# Patient Record
Sex: MALE | Race: WHITE | NOT HISPANIC OR LATINO | Employment: OTHER | URBAN - METROPOLITAN AREA
[De-identification: names, ages, dates, MRNs, and addresses within clinical notes are randomized per-mention and may not be internally consistent; named-entity substitution may affect disease eponyms.]

---

## 2017-01-23 ENCOUNTER — DOCUMENTATION ONLY (OUTPATIENT)
Dept: FAMILY MEDICINE | Facility: CLINIC | Age: 77
End: 2017-01-23

## 2017-01-23 NOTE — PROGRESS NOTES
Pre-Visit Chart Review  For Appointment Scheduled on 1/26/17.    Health Maintenance Due   Topic Date Due    TETANUS VACCINE  07/06/1959    Influenza Vaccine  08/01/2016    Lipid Panel  10/13/2016

## 2017-01-26 ENCOUNTER — OFFICE VISIT (OUTPATIENT)
Dept: FAMILY MEDICINE | Facility: CLINIC | Age: 77
End: 2017-01-26
Payer: MEDICARE

## 2017-01-26 ENCOUNTER — HOSPITAL ENCOUNTER (OUTPATIENT)
Dept: RADIOLOGY | Facility: CLINIC | Age: 77
Discharge: HOME | End: 2017-01-26
Attending: FAMILY MEDICINE
Payer: MEDICARE

## 2017-01-26 VITALS
SYSTOLIC BLOOD PRESSURE: 139 MMHG | BODY MASS INDEX: 26.21 KG/M2 | HEIGHT: 71 IN | HEART RATE: 69 BPM | WEIGHT: 187.19 LBS | DIASTOLIC BLOOD PRESSURE: 85 MMHG | TEMPERATURE: 98 F

## 2017-01-26 DIAGNOSIS — Z12.5 SCREENING FOR PROSTATE CANCER: ICD-10-CM

## 2017-01-26 DIAGNOSIS — Z00.00 ROUTINE MEDICAL EXAM: Primary | ICD-10-CM

## 2017-01-26 DIAGNOSIS — Z23 IMMUNIZATION DUE: ICD-10-CM

## 2017-01-26 DIAGNOSIS — R20.8 SENSATION OF CHANGE IN TEMPERATURE: ICD-10-CM

## 2017-01-26 DIAGNOSIS — E78.2 HYPERLIPIDEMIA, MIXED: ICD-10-CM

## 2017-01-26 DIAGNOSIS — R06.02 SOB (SHORTNESS OF BREATH) ON EXERTION: ICD-10-CM

## 2017-01-26 PROCEDURE — 71020 XR CHEST PA AND LATERAL: CPT | Mod: TC,PO

## 2017-01-26 PROCEDURE — 99397 PER PM REEVAL EST PAT 65+ YR: CPT | Mod: 25,S$GLB,, | Performed by: FAMILY MEDICINE

## 2017-01-26 PROCEDURE — 93010 ELECTROCARDIOGRAM REPORT: CPT | Mod: S$GLB,,, | Performed by: INTERNAL MEDICINE

## 2017-01-26 PROCEDURE — 99999 PR PBB SHADOW E&M-EST. PATIENT-LVL IV: CPT | Mod: PBBFAC,,, | Performed by: FAMILY MEDICINE

## 2017-01-26 PROCEDURE — 99499 UNLISTED E&M SERVICE: CPT | Mod: S$GLB,,, | Performed by: FAMILY MEDICINE

## 2017-01-26 PROCEDURE — 71020 XR CHEST PA AND LATERAL: CPT | Mod: 26,,, | Performed by: RADIOLOGY

## 2017-01-26 PROCEDURE — 93005 ELECTROCARDIOGRAM TRACING: CPT | Mod: S$GLB,,, | Performed by: FAMILY MEDICINE

## 2017-01-26 NOTE — MR AVS SNAPSHOT
[facility]  [address]  [place] [address]  Phone: [phone]  Fax: [phone]                  Willie Liao   2017 1:20 PM   Office Visit    Description:  Male : 1940   Provider:  Anabella Mcintyre MD   Department:  [facility]           Reason for Visit     Establish Care           Diagnoses this Visit        Comments    Routine medical exam    -  Primary     Hyperlipidemia, mixed         Screening for prostate cancer         Immunization due         SOB (shortness of breath) on exertion         Sensation of change in temperature                To Do List           Future Appointments        Provider Department Dept Phone    2017 3:45 PM [facility] XR1 [facility]- X-Ray [phone]    2017 1:45 PM ECHO, SLIDEMARY JANE [facility] - Cardiology [phone]    2017 12:30 PM SLIC US1 [facility]- Ultrasound [phone]    2017 2:00 PM [facility] PULMONARY [facility] [phone]    2017 8:30 AM LAB, IAN SAT [facility] - Lab [phone]      Goals (5 Years of Data)     None      Follow-Up and Disposition     Return in about 6 months (around 2017) for hyperlipidemia.      [facility]     [facility] Care [facility] - 24/7 Assistance  Registered nurses in the [facility] provide clinical advisement, health education, appointment booking, and other advisory services.  Call for this free service at [phone].             Medications           Message regarding Medications     Verify the changes and/or additions to your medication regime listed below are the same as discussed with your clinician today.  If any of these changes or additions are incorrect, please notify your healthcare provider.        STOP taking these medications     b complex vitamins capsule Take 1 capsule by mouth once daily.     pantoprazole (PROTONIX) 40 MG tablet Take 40 mg by mouth once daily.            Verify that  "the below list of medications is an accurate representation of the medications you are currently taking.  If none reported, the list may be blank. If incorrect, please contact your healthcare provider. Carry this list with you in case of emergency.           Current Medications     aspirin (ECOTRIN) 81 MG EC tablet Take 1 tablet (81 mg total) by mouth once daily.    CALCIUM ORAL Take 1 tablet by mouth once daily.    CYANOCOBALAMIN, VITAMIN B-12, (VITAMIN B-12 ORAL) Take 1 tablet by mouth once daily.    MULTIVITAMIN WITH MINERALS/LUT (MULTIVITAMIN 50 PLUS ORAL) Take 1 tablet by mouth once daily.     OMEGA-3 FATTY ACIDS/FISH OIL (OMEGA 3 FISH OIL ORAL) Take 1 capsule by mouth once daily.    simvastatin (ZOCOR) 20 MG tablet TAKE 1 TABLET EVERY EVENING    VITAMIN C 500 MG tablet            Clinical Reference Information           Vital Signs - Last Recorded  Most recent update: 1/26/2017  2:15 PM by Luiza Genao MA    BP Pulse Temp    139/85 (BP Location: Right arm, Patient Position: Sitting, BP Method: Automatic) 69 97.5 °F (36.4 °C) (Oral)    Ht Wt BMI    5' 10.5" (1.791 m) 84.9 kg (187 lb 2.7 oz) 26.48 kg/m2      Blood Pressure          Most Recent Value    BP  139/85      Allergies as of 1/26/2017     No Known Drug Allergies      Immunizations Administered on Date of Encounter - 1/26/2017     Name Date Dose VIS Date Route    Pneumococcal Polysaccharide - 23 Valent  Deferred   -- -- --      Orders Placed During Today's Visit      Normal Orders This Visit    IN OFFICE EKG 12-LEAD (to Muse)     Pneumococcal Polysaccharide Vaccine (23 Valent) (SQ/IM)     Future Labs/Procedures Expected by Expires    CBC auto differential  1/26/2017 3/27/2018    Comprehensive metabolic panel  1/26/2017 3/27/2018    Lipid panel  1/26/2017 3/27/2018    PSA, Screening  1/26/2017 1/26/2018    TSH  1/26/2017 3/27/2018    US Upper Extremity Arteries Bilateral  1/26/2017 1/26/2018    X-Ray Chest PA And Lateral  1/26/2017 1/26/2018    Complete " PFT w/ bronchodilator  As directed 1/26/2018    Exercise stress echo with color flow  As directed 1/26/2018      Instructions    Call clinic and let doctor know after you have completed the breathing tests

## 2017-01-26 NOTE — PROGRESS NOTES
CHIEF COMPLAINT:  Routine medical exam      HISTORY OF PRESENT ILLNESS:  Willie Liao is a 76 y.o. male who presents to clinic as a new patient to me for a routine medical exam. He has noticed SOB with exercise, he denies any CP. He also feels that his left arm and hand are cooler than the right.     He requests a screening PSA. He is due for pneumovax.     REVIEW OF SYSTEMS:  The patient denies any fever, chills, night sweats, headaches, vision changes, difficulty speaking or swallowing, decreased hearing, weight loss, weight gain, chest pain, palpitations, , cough, nausea, vomiting, abdominal pain, dysuria, diarrhea, constipation, hematuria, hematochezia, melena, changes in his hair, skin, nails, numbness or weakness in his extremities, erythema, swelling or pain over any of his joints, myalgia, swollen glands, easy bruising, fatigue, edema.     MEDICATIONS:   Reviewed and/or reconciled in EPIC    ALLERGIES:  Reviewed and/or reconciled in [facility]    PAST MEDICAL/SURGICAL HISTORY:   Past Medical History   Diagnosis Date    Carotid stenosis     Gastritis     GERD (gastroesophageal reflux disease)     H. pylori infection     Hyperlipidemia       Past Surgical History   Procedure Laterality Date    Arthroscope       Dr. Plaza    Colonoscopy       Adrien       FAMILY HISTORY:    Family History   Problem Relation Age of Onset    Heart disease Father     Hyperlipidemia Father     Stroke Father     COPD Mother     Glaucoma Mother     No Known Problems Sister     No Known Problems Brother     No Known Problems Daughter     Diabetes       uncles  and aunts        SOCIAL HISTORY:    Social History     Social History    Marital status:      Spouse name: N/A    Number of children: N/A    Years of education: N/A     Occupational History    Not on file.     Social History Main Topics    Smoking status: Former Smoker     Packs/day: 0.25     Quit date: 7/5/1997    Smokeless tobacco: Never Used     Alcohol use No    Drug use: No    Sexual activity: Yes     Other Topics Concern    Not on file     Social History Narrative     He eats a healthy diet, works out at the gym regularly  PHYSICAL EXAM:  VITAL SIGNS: There were no vitals filed for this visit.  GENERAL:  Patient appears well nourished, sitting on exam table, in no acute distress.  HEENT:  Atraumatic, normocephalic, PERRLA, EOMI, no conjunctival injection, sclerae are anicteric, normal external auditory canals,TMs clear b/l, gross hearing intact to whisper, MMM, no oropharygneal erythema or exudate.  NECK:  Supple, normal ROM, trachea is midline , no supraclavicular or cervical LAD or masses palpated.  Thyroid gland not palpable.  CARDIOVASCULAR:  RRR, normal S1 and S2, no m/r/g.  RESPIRATORY:  CTA b/l, no wheezes, rhonchi, rales.  No increased work of breathing, no  use of accessory muscles.  ABDOMEN:  Soft, nontender, nondistended, normoactive bowel sounds in all four quadrants, no rebound or guarding, no HSM or masses palpated.  Normal percussion.  EXTREMITIES:  2+ DP pulses b/l, no edema.  SKIN:  Warm, no lesions on exposed skin.  NEUROMUSCULAR:  Cranial nerves II-XII grossly intact.  Strength is 4+/5 over upper and lower extremity flexors/extensors b/l, 2+ biceps and patellar reflexes b/l. No clubbing or cyanosis of digits/nails.  Steady gait.  PSYCH:  Patient is alert and oriented to person, time, place. They are appropriately dressed and groomed. There is normal eye contact. Rate and tone of speech is normal. Normal insight, judgement. Normal thought content and process.     LABORATORY/IMAGING STUDIES: I have reviewed a 12 lead EKG which demonstrates a rate of 66 bpm, NSR, no ST changes, axis deviation    ASSESSMENT/PLAN: This is a 76 y.o. male who presents to clinic for routine medical exam  1. Routine medical exam, screening for prostate cancer, immunization due: We will check a screening CBC, CMP, TSH, fasting lipid panel, PSA. Patient will  be notified of these results and the need for any further evaluation and treatment. He will follow up with his dentist/optometrist as scheduled. We discussed the importance of increasing his exercise, continuing with a healthy, well-balanced diet. He will receive a pneumovax in clinic today    2. Hyperlipidemia, mixed  - CBC auto differential; Future  - Comprehensive metabolic panel; Future  - Lipid panel; Future  - TSH; Future    3. SOB (shortness of breath) on exertion  - IN OFFICE EKG 12-LEAD (to Muse)  - X-Ray Chest PA And Lateral; Future  - Complete PFT w/ bronchodilator; Future  - Exercise stress echo with color flow; Future    4. Sensation of change in temperature  - US Upper Extremity Arteries Bilateral; Future            Anabella Mcintyre MD

## 2017-01-30 ENCOUNTER — CLINICAL SUPPORT (OUTPATIENT)
Dept: CARDIOLOGY | Facility: CLINIC | Age: 77
End: 2017-01-30
Payer: MEDICARE

## 2017-01-30 DIAGNOSIS — R06.02 SOB (SHORTNESS OF BREATH) ON EXERTION: ICD-10-CM

## 2017-01-30 LAB
DIASTOLIC DYSFUNCTION: NO
ESTIMATED PA SYSTOLIC PRESSURE: 19.36
MITRAL VALVE REGURGITATION: NORMAL
RETIRED EF AND QEF - SEE NOTES: 70 (ref 55–65)
TRICUSPID VALVE REGURGITATION: NORMAL

## 2017-01-30 PROCEDURE — 93351 STRESS TTE COMPLETE: CPT | Mod: S$GLB,,, | Performed by: INTERNAL MEDICINE

## 2017-01-30 PROCEDURE — 93320 DOPPLER ECHO COMPLETE: CPT | Mod: S$GLB,,, | Performed by: INTERNAL MEDICINE

## 2017-01-30 PROCEDURE — 93325 DOPPLER ECHO COLOR FLOW MAPG: CPT | Mod: S$GLB,,, | Performed by: INTERNAL MEDICINE

## 2017-01-31 ENCOUNTER — TELEPHONE (OUTPATIENT)
Dept: FAMILY MEDICINE | Facility: CLINIC | Age: 77
End: 2017-01-31

## 2017-01-31 ENCOUNTER — HOSPITAL ENCOUNTER (OUTPATIENT)
Dept: RADIOLOGY | Facility: CLINIC | Age: 77
Discharge: HOME | End: 2017-01-31
Attending: FAMILY MEDICINE
Payer: MEDICARE

## 2017-01-31 ENCOUNTER — HOSPITAL ENCOUNTER (OUTPATIENT)
Dept: RESPIRATORY THERAPY | Facility: HOSPITAL | Age: 77
Discharge: HOME | End: 2017-01-31
Attending: FAMILY MEDICINE
Payer: MEDICARE

## 2017-01-31 DIAGNOSIS — R20.8 SENSATION OF CHANGE IN TEMPERATURE: ICD-10-CM

## 2017-01-31 DIAGNOSIS — I77.1 SUBCLAVIAN ARTERY STENOSIS: Primary | ICD-10-CM

## 2017-01-31 DIAGNOSIS — R06.02 SOB (SHORTNESS OF BREATH) ON EXERTION: ICD-10-CM

## 2017-01-31 PROCEDURE — 93930 UPPER EXTREMITY STUDY: CPT | Mod: TC,PO

## 2017-01-31 PROCEDURE — 94060 EVALUATION OF WHEEZING: CPT

## 2017-01-31 PROCEDURE — 93930 UPPER EXTREMITY STUDY: CPT | Mod: 26,,, | Performed by: RADIOLOGY

## 2017-01-31 PROCEDURE — 94729 DIFFUSING CAPACITY: CPT | Mod: 26,,, | Performed by: INTERNAL MEDICINE

## 2017-01-31 PROCEDURE — 94727 GAS DIL/WSHOT DETER LNG VOL: CPT

## 2017-01-31 PROCEDURE — 94729 DIFFUSING CAPACITY: CPT

## 2017-01-31 PROCEDURE — 94060 EVALUATION OF WHEEZING: CPT | Mod: 26,,, | Performed by: INTERNAL MEDICINE

## 2017-01-31 NOTE — TELEPHONE ENCOUNTER
----- Message from Otto Siddiqui sent at 1/31/2017  3:35 PM CST -----  Wife called stated that the pt has completed the breathing test

## 2017-02-02 ENCOUNTER — LAB VISIT (OUTPATIENT)
Dept: LAB | Facility: HOSPITAL | Age: 77
End: 2017-02-02
Attending: FAMILY MEDICINE
Payer: MEDICARE

## 2017-02-02 ENCOUNTER — TELEPHONE (OUTPATIENT)
Dept: FAMILY MEDICINE | Facility: CLINIC | Age: 77
End: 2017-02-02

## 2017-02-02 DIAGNOSIS — E78.2 HYPERLIPIDEMIA, MIXED: ICD-10-CM

## 2017-02-02 DIAGNOSIS — Z12.5 SCREENING FOR PROSTATE CANCER: ICD-10-CM

## 2017-02-02 DIAGNOSIS — Z00.00 ROUTINE MEDICAL EXAM: ICD-10-CM

## 2017-02-02 LAB
ALBUMIN SERPL BCP-MCNC: 3.6 G/DL
ALP SERPL-CCNC: 86 U/L
ALT SERPL W/O P-5'-P-CCNC: 23 U/L
ANION GAP SERPL CALC-SCNC: 5 MMOL/L
AST SERPL-CCNC: 21 U/L
BASOPHILS # BLD AUTO: 0.02 K/UL
BASOPHILS NFR BLD: 0.3 %
BILIRUB SERPL-MCNC: 1.5 MG/DL
BUN SERPL-MCNC: 16 MG/DL
CALCIUM SERPL-MCNC: 8.6 MG/DL
CHLORIDE SERPL-SCNC: 107 MMOL/L
CHOLEST/HDLC SERPL: 4 {RATIO}
CO2 SERPL-SCNC: 26 MMOL/L
COMPLEXED PSA SERPL-MCNC: 0.88 NG/ML
CREAT SERPL-MCNC: 1.1 MG/DL
DIFFERENTIAL METHOD: ABNORMAL
EOSINOPHIL # BLD AUTO: 0.3 K/UL
EOSINOPHIL NFR BLD: 3.8 %
ERYTHROCYTE [DISTWIDTH] IN BLOOD BY AUTOMATED COUNT: 12.9 %
EST. GFR  (AFRICAN AMERICAN): >60 ML/MIN/1.73 M^2
EST. GFR  (NON AFRICAN AMERICAN): >60 ML/MIN/1.73 M^2
GLUCOSE SERPL-MCNC: 116 MG/DL
HCT VFR BLD AUTO: 44.1 %
HDL/CHOLESTEROL RATIO: 25.2 %
HDLC SERPL-MCNC: 159 MG/DL
HDLC SERPL-MCNC: 40 MG/DL
HGB BLD-MCNC: 15.3 G/DL
LDLC SERPL CALC-MCNC: 103.2 MG/DL
LYMPHOCYTES # BLD AUTO: 2.4 K/UL
LYMPHOCYTES NFR BLD: 35.3 %
MCH RBC QN AUTO: 31.9 PG
MCHC RBC AUTO-ENTMCNC: 34.7 %
MCV RBC AUTO: 92 FL
MONOCYTES # BLD AUTO: 0.5 K/UL
MONOCYTES NFR BLD: 7.5 %
NEUTROPHILS # BLD AUTO: 3.5 K/UL
NEUTROPHILS NFR BLD: 52.8 %
NONHDLC SERPL-MCNC: 119 MG/DL
PLATELET # BLD AUTO: 214 K/UL
PMV BLD AUTO: 12.2 FL
POTASSIUM SERPL-SCNC: 4.3 MMOL/L
PROT SERPL-MCNC: 6.7 G/DL
RBC # BLD AUTO: 4.8 M/UL
SODIUM SERPL-SCNC: 138 MMOL/L
TRIGL SERPL-MCNC: 79 MG/DL
TSH SERPL DL<=0.005 MIU/L-ACNC: 3.14 UIU/ML
WBC # BLD AUTO: 6.65 K/UL

## 2017-02-02 PROCEDURE — 36415 COLL VENOUS BLD VENIPUNCTURE: CPT | Mod: PO

## 2017-02-02 PROCEDURE — 84443 ASSAY THYROID STIM HORMONE: CPT

## 2017-02-02 PROCEDURE — 80053 COMPREHEN METABOLIC PANEL: CPT

## 2017-02-02 PROCEDURE — 85025 COMPLETE CBC W/AUTO DIFF WBC: CPT

## 2017-02-02 PROCEDURE — 80061 LIPID PANEL: CPT

## 2017-02-02 PROCEDURE — 84153 ASSAY OF PSA TOTAL: CPT

## 2017-02-02 NOTE — PROCEDURES
Pulmonary Functions, including spirometry and bronchodilator response and diffusion, study was done Jan 31, 2017.  Spirometry shows no obstruction and normal vital capacity and bronchodilator response of over 6%with over 12% needed for statistical significance.   FEV1 is 98% or 3.19 liters.     Diffusion shows within normal range.    Pulmonary functions show normal. Clinical correlation recommended.     Zhang Child M.D.

## 2017-02-02 NOTE — TELEPHONE ENCOUNTER
Stress echo was normal.    The left arm arterial ultrasound demonstrated a significant blockage in his left subclavian artery.  He needs to see vascular surgery, they may want to stent this.    The final PFT report is not yet available.

## 2017-02-02 NOTE — TELEPHONE ENCOUNTER
----- Message from Anabella Mcintyre MD sent at 2/2/2017  1:15 PM CST -----  Look up final PFT results

## 2017-02-03 NOTE — TELEPHONE ENCOUNTER
Patient notified and states understanding.vascular appointment scheduled 2/22/17 at 2pm is this appointment ok??

## 2017-02-09 ENCOUNTER — TELEPHONE (OUTPATIENT)
Dept: FAMILY MEDICINE | Facility: CLINIC | Age: 77
End: 2017-02-09

## 2017-02-09 DIAGNOSIS — R73.9 HYPERGLYCEMIA: Primary | ICD-10-CM

## 2017-02-09 NOTE — TELEPHONE ENCOUNTER
Final PFT results are normal. No evidence of lung disease such as asthma, COPD, emphysema.     Stress echo did not demonstrate any significant abnormalities.    Needs to continue to monitor his SOB on exertion, notify me if this worsens.

## 2017-02-09 NOTE — TELEPHONE ENCOUNTER
----- Message from Anabella Mcintyre MD sent at 2/2/2017  1:28 PM CST -----  Awaiting final read on PFTs.

## 2017-02-09 NOTE — TELEPHONE ENCOUNTER
Patient notified of results.  He has no questions or concerns at this time.  Will follow up if needed.

## 2017-02-10 RX ORDER — ATORVASTATIN CALCIUM 20 MG/1
20 TABLET, FILM COATED ORAL DAILY
Qty: 90 TABLET | Refills: 3 | Status: SHIPPED | OUTPATIENT
Start: 2017-02-10 | End: 2018-03-02 | Stop reason: SDUPTHER

## 2017-02-10 NOTE — TELEPHONE ENCOUNTER
Spoke to pt and wife--advised of results. Scheduled repeat labs for 2/16/17. Pt is agreeable to changing his statin. Please send 90 day supply to [facility]. Thanks, Luiza

## 2017-02-10 NOTE — TELEPHONE ENCOUNTER
Blood work normal except that cardiac risk is high enough to be on a high intensity statin such as lipitor or crestor instead of zocor. Let me know if he would like to change this.  Also FBG is high concerning for prediabetes. Needs to eat low carb, low fat diet and have repeat FBG, HgA1c      Please also give these results and the PFT results to his wife

## 2017-02-15 ENCOUNTER — LAB VISIT (OUTPATIENT)
Dept: LAB | Facility: HOSPITAL | Age: 77
End: 2017-02-15
Attending: FAMILY MEDICINE
Payer: MEDICARE

## 2017-02-15 DIAGNOSIS — R73.9 HYPERGLYCEMIA: ICD-10-CM

## 2017-02-15 LAB — GLUCOSE SERPL-MCNC: 114 MG/DL

## 2017-02-15 PROCEDURE — 36415 COLL VENOUS BLD VENIPUNCTURE: CPT | Mod: PO

## 2017-02-15 PROCEDURE — 83036 HEMOGLOBIN GLYCOSYLATED A1C: CPT

## 2017-02-15 PROCEDURE — 82947 ASSAY GLUCOSE BLOOD QUANT: CPT

## 2017-02-16 LAB
ESTIMATED AVG GLUCOSE: 111 MG/DL
HBA1C MFR BLD HPLC: 5.5 %

## 2017-02-22 ENCOUNTER — OFFICE VISIT (OUTPATIENT)
Dept: VASCULAR SURGERY | Facility: CLINIC | Age: 77
End: 2017-02-22
Payer: MEDICARE

## 2017-02-22 ENCOUNTER — TELEPHONE (OUTPATIENT)
Dept: FAMILY MEDICINE | Facility: CLINIC | Age: 77
End: 2017-02-22

## 2017-02-22 VITALS
BODY MASS INDEX: 26.23 KG/M2 | HEART RATE: 85 BPM | WEIGHT: 187.38 LBS | HEIGHT: 71 IN | SYSTOLIC BLOOD PRESSURE: 156 MMHG | DIASTOLIC BLOOD PRESSURE: 93 MMHG

## 2017-02-22 DIAGNOSIS — I77.1 SUBCLAVIAN ARTERY STENOSIS, LEFT: Primary | ICD-10-CM

## 2017-02-22 PROCEDURE — 1157F ADVNC CARE PLAN IN RCRD: CPT | Mod: S$GLB,,, | Performed by: THORACIC SURGERY (CARDIOTHORACIC VASCULAR SURGERY)

## 2017-02-22 PROCEDURE — 1160F RVW MEDS BY RX/DR IN RCRD: CPT | Mod: S$GLB,,, | Performed by: THORACIC SURGERY (CARDIOTHORACIC VASCULAR SURGERY)

## 2017-02-22 PROCEDURE — 99499 UNLISTED E&M SERVICE: CPT | Mod: S$GLB,,, | Performed by: THORACIC SURGERY (CARDIOTHORACIC VASCULAR SURGERY)

## 2017-02-22 PROCEDURE — 1126F AMNT PAIN NOTED NONE PRSNT: CPT | Mod: S$GLB,,, | Performed by: THORACIC SURGERY (CARDIOTHORACIC VASCULAR SURGERY)

## 2017-02-22 PROCEDURE — 1159F MED LIST DOCD IN RCRD: CPT | Mod: S$GLB,,, | Performed by: THORACIC SURGERY (CARDIOTHORACIC VASCULAR SURGERY)

## 2017-02-22 PROCEDURE — 99213 OFFICE O/P EST LOW 20 MIN: CPT | Mod: S$GLB,,, | Performed by: THORACIC SURGERY (CARDIOTHORACIC VASCULAR SURGERY)

## 2017-02-22 NOTE — PROGRESS NOTES
OFFICE NOTE    HISTORY OF PRESENT ILLNESS:  This is a 76-year-old gentleman with known carotid   occlusive disease.  He has been asymptomatic with this; however, he recently had   ultrasound of the extremity showing high-grade left subclavian artery stenosis.    He does get occasionally symptomatic with coolness of the hand and fingers   when the temperature drops outside.  His medicines are noted.  His problem list   is reviewed.    PHYSICAL EXAMINATION:    VITAL SIGNS:  Stable.  He does have a slight pressure discrepancy between the   right and left upper extremities, but not terribly significant.    CHEST:  Clear.    HEART:  Regular rate and rhythm.    ABDOMEN:  Benign.    EXTREMITIES:  Femoral pulses are equal.  Radial pulses somewhat diminished on   the left compared to the right.    At this point, he is asymptomatic from his subclavian artery stenosis except for   the coolness in his hand and fingers.  I told him that if this progresses or he   has significant pain or more problems with fatigue in claudication, then   consideration could be made for revascularization percutaneously of the left   subclavian artery, but at this point, I think conservative management is   warranted.  He is scheduled to get a carotid ultrasound within the next couple   of months and based on this, we can make recommendations regarding his carotid   and subclavian artery occlusive disease, but I think medical management right   now is indicated.      BETTE  dd: 02/22/2017 14:38:22 (CST)  td: 02/23/2017 01:16:04 (CST)  Doc ID   #[id number]  Job ID #[id number]    CC:

## 2017-02-22 NOTE — LETTER
February 22, 2017      Anabella Mcintyre MD  [address]  [place] [address]           [place] [facility]  [address], [address]  [place] [address]  Phone: [phone]          Patient: Willie Liao   MR Number: [id number]   YOB: 1940   Date of Visit: 2/22/2017       Dear Dr. Anabella Mcintyre:    Thank you for referring Willie Liao to me for evaluation. Attached you will find relevant portions of my assessment and plan of care.    If you have questions, please do not hesitate to call me. I look forward to following Willie Liao along with you.    Sincerely,    Pedro Pablo Parry MD    Enclosure  CC:  No Recipients    If you would like to receive this communication electronically, please contact [email or web address] or [phone] to request more information on [employer] Link access.    For providers and/or their staff who would like to refer a patient to [facility], please contact us through our one-stop-shop provider referral line, [facility], at [phone].    If you feel you have received this communication in error or would no longer like to receive these types of communications, please e-mail [email or web address]

## 2017-03-14 ENCOUNTER — TELEPHONE (OUTPATIENT)
Dept: FAMILY MEDICINE | Facility: CLINIC | Age: 77
End: 2017-03-14

## 2017-03-15 ENCOUNTER — CLINICAL SUPPORT (OUTPATIENT)
Dept: FAMILY MEDICINE | Facility: CLINIC | Age: 77
End: 2017-03-15
Payer: MEDICARE

## 2017-03-15 PROCEDURE — G0009 ADMIN PNEUMOCOCCAL VACCINE: HCPCS | Mod: S$GLB,,, | Performed by: PHYSICIAN ASSISTANT

## 2017-03-15 PROCEDURE — 90732 PPSV23 VACC 2 YRS+ SUBQ/IM: CPT | Mod: S$GLB,,, | Performed by: PHYSICIAN ASSISTANT

## 2017-03-15 NOTE — PROGRESS NOTES
Patient identified by 2 patient identified (name and ). Administered Pneumoccal polysaccharide 23  IM. Patient tolerated well. No bleeding at insertion site noted. Aseptic technique maintained.

## 2017-04-11 ENCOUNTER — DOCUMENTATION ONLY (OUTPATIENT)
Dept: FAMILY MEDICINE | Facility: CLINIC | Age: 77
End: 2017-04-11

## 2017-04-11 NOTE — PROGRESS NOTES
Pre-Visit Chart Review  For Appointment Scheduled on 04/13/2017      There are no preventive care reminders to display for this patient.

## 2017-04-13 ENCOUNTER — OFFICE VISIT (OUTPATIENT)
Dept: FAMILY MEDICINE | Facility: CLINIC | Age: 77
End: 2017-04-13
Payer: MEDICARE

## 2017-04-13 VITALS
SYSTOLIC BLOOD PRESSURE: 142 MMHG | TEMPERATURE: 98 F | DIASTOLIC BLOOD PRESSURE: 80 MMHG | HEART RATE: 69 BPM | HEIGHT: 71 IN | WEIGHT: 185.88 LBS | BODY MASS INDEX: 26.02 KG/M2

## 2017-04-13 DIAGNOSIS — Z00.00 ENCOUNTER FOR PREVENTIVE HEALTH EXAMINATION: ICD-10-CM

## 2017-04-13 DIAGNOSIS — I77.9 CAROTID ARTERY DISEASE, UNSPECIFIED LATERALITY: ICD-10-CM

## 2017-04-13 DIAGNOSIS — E78.2 HYPERLIPIDEMIA, MIXED: ICD-10-CM

## 2017-04-13 DIAGNOSIS — E80.4 GILBERT'S SYNDROME: ICD-10-CM

## 2017-04-13 DIAGNOSIS — K21.9 GASTROESOPHAGEAL REFLUX DISEASE WITHOUT ESOPHAGITIS: ICD-10-CM

## 2017-04-13 DIAGNOSIS — I77.1 SUBCLAVIAN ARTERY STENOSIS, LEFT: Primary | ICD-10-CM

## 2017-04-13 PROCEDURE — G0439 PPPS, SUBSEQ VISIT: HCPCS | Mod: S$GLB,,, | Performed by: PHYSICIAN ASSISTANT

## 2017-04-13 PROCEDURE — 99999 PR PBB SHADOW E&M-EST. PATIENT-LVL III: CPT | Mod: PBBFAC,,, | Performed by: PHYSICIAN ASSISTANT

## 2017-04-13 RX ORDER — PANTOPRAZOLE SODIUM 40 MG/1
40 TABLET, DELAYED RELEASE ORAL DAILY
COMMUNITY
End: 2024-02-27

## 2017-04-13 NOTE — Clinical Note
Primary Care Providers: Anabella Mcintyre MD, MD (General)  Your patient was seen today for a HRA visit. Gap(s) in care (HEDIS gaps) have been identified during this visit that require additional testing and possible follow up.  No orders of the defined types were placed in this encounter.   These orders were placed using [facility] approved protocol and any results will be forwarded to your office for appropriate follow up. I have included a copy of my visit note; please review the note and feel free to contact me with any questions.   Thank you for allowing me to participate in the care of your patients. SAGAR Mariee

## 2017-04-13 NOTE — PROGRESS NOTES
"Willie Liao presented for a  Medicare AWV and comprehensive Health Risk Assessment today. The following components were reviewed and updated:    · Medical history  · Family History  · Social history  · Allergies and Current Medications  · Health Risk Assessment  · Health Maintenance  · Care Team     ** See Completed Assessments for Annual Wellness Visit within the encounter summary.**       The following assessments were completed:  · Living Situation  · CAGE  · Depression Screening  · Timed Get Up and Go  · Whisper Test  · Cognitive Function Screening  · Nutrition Screening  · ADL Screening  · PAQ Screening    Vitals:    04/13/17 0800   BP: (!) 142/80   BP Location: Left arm   Patient Position: Sitting   BP Method: Automatic   Pulse: 69   Temp: 97.6 °F (36.4 °C)   TempSrc: Oral   Weight: 84.3 kg (185 lb 13.6 oz)   Height: 5' 10.5" (1.791 m)     Body mass index is 26.29 kg/(m^2).  Physical Exam   Constitutional: He is oriented to person, place, and time. He appears well-developed and well-nourished. No distress.   HENT:   Head: Normocephalic and atraumatic.   Eyes: Conjunctivae are normal. Pupils are equal, round, and reactive to light.   Neck: Normal range of motion. Neck supple. No JVD present. No thyromegaly present.   Cardiovascular: Normal rate and regular rhythm.  Exam reveals no gallop and no friction rub.    No murmur heard.  Pulmonary/Chest: Effort normal. No respiratory distress. He has no wheezes. He has no rales. He exhibits no tenderness.   Neurological: He is alert and oriented to person, place, and time.   Skin: He is not diaphoretic.               Diagnoses and health risks identified today and associated recommendations/orders:    Willie was seen today for health risk assessment.    Diagnoses and all orders for this visit:    Subclavian artery stenosis, left  Comments:  stable, followed by cardiology    Carotid artery disease, unspecified laterality  Comments:  stable, continue to " monitor    Hyperlipidemia, mixed  Comments:  controlled, continue meds    Gastroesophageal reflux disease without esophagitis  Comments:  controlled, continue meds    Gilbert's syndrome  Comments:  stable, continue to monitor    Encounter for preventive health examination        Provided Willie with a 5-10 year written screening schedule and personal prevention plan. Recommendations were developed using the USPSTF age appropriate recommendations. Education, counseling, and referrals were provided as needed. After Visit Summary printed and given to patient which includes a list of additional screenings\tests needed.    No Follow-up on file.    SAGAR Mariee

## 2017-04-13 NOTE — PATIENT INSTRUCTIONS
Counseling and Referral of Other Preventative  (Italic type indicates deductible and co-insurance are waived)    Patient Name: Willie Liao  Today's Date: 4/13/2017      SERVICE LIMITATIONS RECOMMENDATION    Vaccines    · Pneumococcal (once after 65)    · Influenza (annually)    · Hepatitis B (if medium/high risk)    · Prevnar 13      Hepatitis B medium/high risk factors:       - End-stage renal disease       - Hemophiliacs who received Factor VII or         IX concentrates       - Clients of institutions for the mentally             retarded       - Persons who live in the same house as          a HepB carrier       - Homosexual men       - Illicit injectable drug abusers     Pneumococcal: Done, no repeat necessary     Influenza: Done, repeat in one year     Hepatitis B: Done, no repeat necessary     Prevnar 13: Done, no repeat necessary    Prostate cancer screening (annually to age 75)     Prostate specific antigen (PSA) Shared decision making with Provider. Sometimes a co-pay may be required if the patient decides to have this test. The USPSTF no longer recommends prostate cancer screening routinely in medicine: every 1 year    Colorectal cancer screening (to age 75)    · Fecal occult blood test (annual)  · Flexible sigmoidoscopy (5y)  · Screening colonoscopy (10y)  · Barium enema   N/A    Diabetes self-management training (no USPSTF recommendations)  Requires referral by treating physician for patient with diabetes or renal disease. 10 hours of initial DSMT sessions of no less than 30 minutes each in a continuous 12-month period. 2 hours of follow-up DSMT in subsequent years.  N/A    Glaucoma screening (no USPSTF recommendation)  Diabetes mellitus, family history   , age 50 or over    American, age 65 or over  Done this year, repeat every year    Medical nutrition therapy for diabetes or renal disease (no recommended schedule)  Requires referral by treating physician for patient with  diabetes or renal disease or kidney transplant within the past 3 years.  Can be provided in same year as diabetes self-management training (DSMT), and CMS recommends medical nutrition therapy take place after DSMT. Up to 3 hours for initial year and 2 hours in subsequent years.  N/A    Cardiovascular screening blood tests (every 5 years)  · Fasting lipid panel  Order as a panel if possible  Done this year, repeat every year    Diabetes screening tests (at least every 3 years, Medicare covers annually or at 6-month intervals for prediabetic patients)  · Fasting blood sugar (FBS) or glucose tolerance test (GTT)  Patient must be diagnosed with one of the following:       - Hypertension       - Dyslipidemia       - Obesity (BMI 30kg/m2)       - Previous elevated impaired FBS or GTT       ... or any two of the following:       - Overweight (BMI 25 but <30)       - Family history of diabetes       - Age 65 or older       - History of gestational diabetes or birth of baby weighing more than 9 pounds  Done this year, repeat every year    Abdominal aortic aneurysm screening (once)  · Sonogram   Limited to patients who meet one of the following criteria:       - Men who are 65-75 years old and have smoked more than 100 cigarette in their lifetime       - Anyone with a family history of abdominal aortic aneurysm       - Anyone recommended for screening by the USPSTF  N/A    HIV screening (annually for increased risk patients)  · HIV-1 and HIV-2 by EIA, or [place], rapid antibody test or oral mucosa transudate  Patients must be at increased risk for HIV infection per USPSTF guidelines or pregnant. Tests covered annually for patient at increased risk or as requested by the patient. Pregnant patients may receive up to 3 tests during pregnancy.  Risks discussed, screening is not recommended    Smoking cessation counseling (up to 8 sessions per year)  Patients must be asymptomatic of tobacco-related conditions to receive as a  preventative service.  Non-smoker    Subsequent annual wellness visit  At least 12 months since last AWV  Return in one year     The following information is provided to all patients.  This information is to help you find resources for any of the problems found today that may be affecting your health:                Living healthy guide: www.[facility].[place].[facility]      Understanding Diabetes: www.diabetes.org      Eating healthy: www.cdc.gov/healthyweight      CDC home safety checklist: www.cdc.gov/steadi/patient.html      Agency on Aging: www.goea.[place].[facility]      Alcoholics anonymous (AA): www.aa.org      Physical Activity: www.jannette.nih.gov/nc5shis      Tobacco use: www.quitwithusla.org

## 2017-05-30 DIAGNOSIS — I65.23 BILATERAL CAROTID ARTERY STENOSIS: Primary | ICD-10-CM

## 2017-06-09 ENCOUNTER — HOSPITAL ENCOUNTER (OUTPATIENT)
Dept: RADIOLOGY | Facility: CLINIC | Age: 77
Discharge: HOME | End: 2017-06-09
Attending: THORACIC SURGERY (CARDIOTHORACIC VASCULAR SURGERY)
Payer: MEDICARE

## 2017-06-09 DIAGNOSIS — I65.23 BILATERAL CAROTID ARTERY STENOSIS: ICD-10-CM

## 2017-06-09 PROCEDURE — 93880 EXTRACRANIAL BILAT STUDY: CPT | Mod: TC,PO

## 2017-06-09 PROCEDURE — 93880 EXTRACRANIAL BILAT STUDY: CPT | Mod: 26,,, | Performed by: RADIOLOGY

## 2017-06-13 ENCOUNTER — TELEPHONE (OUTPATIENT)
Dept: VASCULAR SURGERY | Facility: CLINIC | Age: 77
End: 2017-06-13

## 2017-06-13 NOTE — TELEPHONE ENCOUNTER
----- Message from Caro Parker sent at 6/13/2017 11:15 AM CDT -----  Contact: Wife  Genie, wife [phone], Calling about appointment on 6/14/17 at 1 Pm. Patient and family were to be advised if they should keep this appointment after the test results were received. Please advise.Thanks.

## 2017-08-18 ENCOUNTER — DOCUMENTATION ONLY (OUTPATIENT)
Dept: FAMILY MEDICINE | Facility: CLINIC | Age: 77
End: 2017-08-18

## 2017-08-18 NOTE — PROGRESS NOTES
Pre-Visit Chart Review  For Appointment Scheduled on 8/31/17.    Health Maintenance Due   Topic Date Due    Influenza Vaccine  08/01/2017

## 2017-08-31 ENCOUNTER — OFFICE VISIT (OUTPATIENT)
Dept: FAMILY MEDICINE | Facility: CLINIC | Age: 77
End: 2017-08-31
Payer: MEDICARE

## 2017-08-31 VITALS
HEIGHT: 71 IN | TEMPERATURE: 98 F | HEART RATE: 98 BPM | DIASTOLIC BLOOD PRESSURE: 68 MMHG | WEIGHT: 184.31 LBS | SYSTOLIC BLOOD PRESSURE: 121 MMHG | BODY MASS INDEX: 25.8 KG/M2

## 2017-08-31 DIAGNOSIS — E78.2 HYPERLIPIDEMIA, MIXED: Primary | ICD-10-CM

## 2017-08-31 DIAGNOSIS — R73.9 HYPERGLYCEMIA: ICD-10-CM

## 2017-08-31 PROCEDURE — 1159F MED LIST DOCD IN RCRD: CPT | Mod: S$GLB,,, | Performed by: FAMILY MEDICINE

## 2017-08-31 PROCEDURE — 99999 PR PBB SHADOW E&M-EST. PATIENT-LVL III: CPT | Mod: PBBFAC,,, | Performed by: FAMILY MEDICINE

## 2017-08-31 PROCEDURE — 99499 UNLISTED E&M SERVICE: CPT | Mod: S$GLB,,, | Performed by: FAMILY MEDICINE

## 2017-08-31 PROCEDURE — 1126F AMNT PAIN NOTED NONE PRSNT: CPT | Mod: S$GLB,,, | Performed by: FAMILY MEDICINE

## 2017-08-31 PROCEDURE — 3008F BODY MASS INDEX DOCD: CPT | Mod: S$GLB,,, | Performed by: FAMILY MEDICINE

## 2017-08-31 PROCEDURE — 99214 OFFICE O/P EST MOD 30 MIN: CPT | Mod: S$GLB,,, | Performed by: FAMILY MEDICINE

## 2017-08-31 NOTE — PROGRESS NOTES
CHIEF COMPLAINT:  Follow up      HISTORY OF PRESENT ILLNESS:  Willie Liao is a 77 y.o. male who presents to clinic for follow up. He has hyperlipidemia and is on ASA and lipitor. He denies any myalgia, dark colored urine. He is due for labs. He also has hyperglycemia, he is active but does eat a lot of candy.     REVIEW OF SYSTEMS:  The patient denies any fever, chills, night sweats, headaches, vision changes, difficulty speaking or swallowing, decreased hearing, weight loss, weight gain, chest pain, palpitations, , cough, nausea, vomiting, abdominal pain, dysuria, diarrhea, constipation, hematuria, hematochezia, melena, changes in his hair, skin, nails, numbness or weakness in his extremities, erythema, swelling or pain over any of his joints, myalgia, swollen glands, easy bruising, fatigue, edema.     MEDICATIONS:   Reviewed and/or reconciled in EPIC    ALLERGIES:  Reviewed and/or reconciled in EPIC    PAST MEDICAL/SURGICAL HISTORY:   Past Medical History:   Diagnosis Date    Carotid stenosis     Gastritis     GERD (gastroesophageal reflux disease)     H. pylori infection     Hyperlipidemia       Past Surgical History:   Procedure Laterality Date    arthroscope Left     knee arthroscopy, torn meniscus    COLONOSCOPY      Adrien       FAMILY HISTORY:    Family History   Problem Relation Age of Onset    Heart disease Father 65     MI    Hyperlipidemia Father     Stroke Father     COPD Mother     Glaucoma Mother     No Known Problems Sister     No Known Problems Brother     No Known Problems Daughter     Diabetes       uncles  and aunts     Cancer Paternal Uncle      prostate cancer       SOCIAL HISTORY:    Social History     Social History    Marital status:      Spouse name: N/A    Number of children: N/A    Years of education: N/A     Occupational History    Not on file.     Social History Main Topics    Smoking status: Former Smoker     Packs/day: 0.25     Quit date: 7/5/1997  "   Smokeless tobacco: Never Used    Alcohol use No    Drug use: No    Sexual activity: Yes     Other Topics Concern    Not on file     Social History Narrative    No narrative on file     He eats a healthy diet, works out at the gym regularly  PHYSICAL EXAM:  VITAL SIGNS:   Vitals:    08/31/17 1310   BP: 121/68   BP Location: Right arm   Patient Position: Sitting   BP Method: Small (Automatic)   Pulse: 98   Temp: 98.1 °F (36.7 °C)   TempSrc: Oral   Weight: 83.6 kg (184 lb 4.9 oz)   Height: 5' 10.5" (1.791 m)     GENERAL:  Patient appears well nourished, sitting on exam table, in no acute distress.  HEENT:  Atraumatic, normocephalic, PERRLA, EOMI, no conjunctival injection, sclerae are anicteric, normal external auditory canals,TMs clear b/l, gross hearing intact to whisper, MMM, no oropharygneal erythema or exudate.  NECK:  Supple, normal ROM, trachea is midline , no supraclavicular or cervical LAD or masses palpated.  Thyroid gland not palpable.  CARDIOVASCULAR:  RRR, normal S1 and S2, no m/r/g.  RESPIRATORY:  CTA b/l, no wheezes, rhonchi, rales.  No increased work of breathing, no  use of accessory muscles.  ABDOMEN:  Soft, nontender, nondistended, normoactive bowel sounds in all four quadrants, no rebound or guarding, no HSM or masses palpated.  Normal percussion.  EXTREMITIES:  2+ DP pulses b/l, no edema.  SKIN:  Warm, no lesions on exposed skin.  NEUROMUSCULAR:  Cranial nerves II-XII grossly intact.  No clubbing or cyanosis of digits/nails.  Steady gait.  PSYCH:  Patient is alert and oriented to person, time, place. They are appropriately dressed and groomed. There is normal eye contact. Rate and tone of speech is normal. Normal insight, judgement. Normal thought content and process.     LABORATORY/IMAGING STUDIES: pending    ASSESSMENT/PLAN: This is a 77 y.o. male who presents to clinic for routine medical exam  1. Hyperlipidemia, mixed  - Comprehensive metabolic panel; Future  - Lipid panel; Future  - " Hemoglobin A1c; Future    2. Hyperglycemia  -FBG  - Hemoglobin A1c; Future              Anabella Mcintyre MD

## 2017-09-01 ENCOUNTER — LAB VISIT (OUTPATIENT)
Dept: LAB | Facility: HOSPITAL | Age: 77
End: 2017-09-01
Attending: FAMILY MEDICINE
Payer: MEDICARE

## 2017-09-01 DIAGNOSIS — R73.9 HYPERGLYCEMIA: ICD-10-CM

## 2017-09-01 DIAGNOSIS — E78.2 HYPERLIPIDEMIA, MIXED: ICD-10-CM

## 2017-09-01 LAB
ALBUMIN SERPL BCP-MCNC: 3.6 G/DL
ALP SERPL-CCNC: 83 U/L
ALT SERPL W/O P-5'-P-CCNC: 21 U/L
ANION GAP SERPL CALC-SCNC: 5 MMOL/L
AST SERPL-CCNC: 19 U/L
BILIRUB SERPL-MCNC: 2.3 MG/DL
BUN SERPL-MCNC: 18 MG/DL
CALCIUM SERPL-MCNC: 9.6 MG/DL
CHLORIDE SERPL-SCNC: 107 MMOL/L
CHOLEST SERPL-MCNC: 126 MG/DL
CHOLEST/HDLC SERPL: 3.2 {RATIO}
CO2 SERPL-SCNC: 30 MMOL/L
CREAT SERPL-MCNC: 1.1 MG/DL
EST. GFR  (AFRICAN AMERICAN): >60 ML/MIN/1.73 M^2
EST. GFR  (NON AFRICAN AMERICAN): >60 ML/MIN/1.73 M^2
ESTIMATED AVG GLUCOSE: 111 MG/DL
GLUCOSE SERPL-MCNC: 101 MG/DL
HBA1C MFR BLD HPLC: 5.5 %
HDLC SERPL-MCNC: 39 MG/DL
HDLC SERPL: 31 %
LDLC SERPL CALC-MCNC: 69.6 MG/DL
NONHDLC SERPL-MCNC: 87 MG/DL
POTASSIUM SERPL-SCNC: 4.4 MMOL/L
PROT SERPL-MCNC: 7 G/DL
SODIUM SERPL-SCNC: 142 MMOL/L
TRIGL SERPL-MCNC: 87 MG/DL

## 2017-09-01 PROCEDURE — 80061 LIPID PANEL: CPT

## 2017-09-01 PROCEDURE — 83036 HEMOGLOBIN GLYCOSYLATED A1C: CPT

## 2017-09-01 PROCEDURE — 36415 COLL VENOUS BLD VENIPUNCTURE: CPT | Mod: PO

## 2017-09-01 PROCEDURE — 80053 COMPREHEN METABOLIC PANEL: CPT

## 2018-02-20 ENCOUNTER — DOCUMENTATION ONLY (OUTPATIENT)
Dept: FAMILY MEDICINE | Facility: CLINIC | Age: 78
End: 2018-02-20

## 2018-02-20 ENCOUNTER — OFFICE VISIT (OUTPATIENT)
Dept: FAMILY MEDICINE | Facility: CLINIC | Age: 78
End: 2018-02-20
Payer: MEDICARE

## 2018-02-20 VITALS
WEIGHT: 185.63 LBS | DIASTOLIC BLOOD PRESSURE: 83 MMHG | HEIGHT: 71 IN | BODY MASS INDEX: 25.99 KG/M2 | TEMPERATURE: 98 F | HEART RATE: 89 BPM | SYSTOLIC BLOOD PRESSURE: 138 MMHG

## 2018-02-20 DIAGNOSIS — J32.9 SINUSITIS, UNSPECIFIED CHRONICITY, UNSPECIFIED LOCATION: Primary | ICD-10-CM

## 2018-02-20 PROCEDURE — 1126F AMNT PAIN NOTED NONE PRSNT: CPT | Mod: S$GLB,,, | Performed by: PHYSICIAN ASSISTANT

## 2018-02-20 PROCEDURE — 1159F MED LIST DOCD IN RCRD: CPT | Mod: S$GLB,,, | Performed by: PHYSICIAN ASSISTANT

## 2018-02-20 PROCEDURE — 99213 OFFICE O/P EST LOW 20 MIN: CPT | Mod: S$GLB,,, | Performed by: PHYSICIAN ASSISTANT

## 2018-02-20 PROCEDURE — 99999 PR PBB SHADOW E&M-EST. PATIENT-LVL III: CPT | Mod: PBBFAC,,, | Performed by: PHYSICIAN ASSISTANT

## 2018-02-20 PROCEDURE — 3008F BODY MASS INDEX DOCD: CPT | Mod: S$GLB,,, | Performed by: PHYSICIAN ASSISTANT

## 2018-02-20 RX ORDER — PREDNISONE 10 MG/1
TABLET ORAL
Qty: 9 TABLET | Refills: 0 | Status: SHIPPED | OUTPATIENT
Start: 2018-02-20 | End: 2018-02-28 | Stop reason: ALTCHOICE

## 2018-02-20 RX ORDER — AMOXICILLIN AND CLAVULANATE POTASSIUM 875; 125 MG/1; MG/1
1 TABLET, FILM COATED ORAL 2 TIMES DAILY
Qty: 20 TABLET | Refills: 0 | Status: SHIPPED | OUTPATIENT
Start: 2018-02-20 | End: 2018-02-28

## 2018-02-20 RX ORDER — PROMETHAZINE HYDROCHLORIDE AND DEXTROMETHORPHAN HYDROBROMIDE 6.25; 15 MG/5ML; MG/5ML
5 SYRUP ORAL EVERY 6 HOURS PRN
Qty: 240 ML | Refills: 0 | Status: SHIPPED | OUTPATIENT
Start: 2018-02-20 | End: 2018-02-28

## 2018-02-20 NOTE — PROGRESS NOTES
Pre-Visit Chart Review  For Appointment Scheduled on 02/20/2018      Health Maintenance Due   Topic Date Due    Influenza Vaccine  08/01/2017

## 2018-02-23 ENCOUNTER — DOCUMENTATION ONLY (OUTPATIENT)
Dept: FAMILY MEDICINE | Facility: CLINIC | Age: 78
End: 2018-02-23

## 2018-02-23 NOTE — PROGRESS NOTES
Pre-Visit Chart Review  For Appointment Scheduled on 2/28/18.    Health Maintenance Due   Topic Date Due    Influenza Vaccine  08/01/2017

## 2018-02-28 ENCOUNTER — OFFICE VISIT (OUTPATIENT)
Dept: FAMILY MEDICINE | Facility: CLINIC | Age: 78
End: 2018-02-28
Payer: MEDICARE

## 2018-02-28 VITALS
BODY MASS INDEX: 25.86 KG/M2 | HEIGHT: 71 IN | SYSTOLIC BLOOD PRESSURE: 133 MMHG | DIASTOLIC BLOOD PRESSURE: 86 MMHG | HEART RATE: 79 BPM | TEMPERATURE: 98 F | WEIGHT: 184.75 LBS

## 2018-02-28 DIAGNOSIS — R73.9 HYPERGLYCEMIA: ICD-10-CM

## 2018-02-28 DIAGNOSIS — E78.2 HYPERLIPIDEMIA, MIXED: Primary | ICD-10-CM

## 2018-02-28 DIAGNOSIS — Z12.5 SCREENING FOR MALIGNANT NEOPLASM OF PROSTATE: ICD-10-CM

## 2018-02-28 PROCEDURE — 99214 OFFICE O/P EST MOD 30 MIN: CPT | Mod: S$GLB,,, | Performed by: FAMILY MEDICINE

## 2018-02-28 PROCEDURE — 99999 PR PBB SHADOW E&M-EST. PATIENT-LVL III: CPT | Mod: PBBFAC,,, | Performed by: FAMILY MEDICINE

## 2018-02-28 NOTE — PROGRESS NOTES
CHIEF COMPLAINT:  Follow up      HISTORY OF PRESENT ILLNESS:  Willie Liao is a 77 y.o. male who presents to clinic for follow up. He has hyperlipidemia and is on ASA and lipitor. He denies any myalgia, dark colored urine. He is due for labs. He also has hyperglycemia, he is active but does eat a lot of candy. He is also due for a screening PSA.    REVIEW OF SYSTEMS:  The patient denies any fever, chills, night sweats, headaches, vision changes, difficulty speaking or swallowing, decreased hearing, weight loss, weight gain, chest pain, palpitations, , cough, nausea, vomiting, abdominal pain, dysuria, diarrhea, constipation, hematuria, hematochezia, melena, changes in his hair, skin, nails, numbness or weakness in his extremities, erythema, swelling or pain over any of his joints, myalgia, swollen glands, easy bruising, fatigue, edema.     MEDICATIONS:   Reviewed and/or reconciled in EPIC    ALLERGIES:  Reviewed and/or reconciled in [facility]    PAST MEDICAL/SURGICAL HISTORY:   Past Medical History:   Diagnosis Date    Carotid stenosis     Gastritis     GERD (gastroesophageal reflux disease)     H. pylori infection     Hyperlipidemia       Past Surgical History:   Procedure Laterality Date    arthroscope Left     knee arthroscopy, torn meniscus    COLONOSCOPY      Adrien       FAMILY HISTORY:    Family History   Problem Relation Age of Onset    Heart disease Father 65     MI    Hyperlipidemia Father     Stroke Father     COPD Mother     Glaucoma Mother     No Known Problems Sister     No Known Problems Brother     No Known Problems Daughter     Diabetes       uncles  and aunts     Cancer Paternal Uncle      prostate cancer       SOCIAL HISTORY:    Social History     Social History    Marital status:      Spouse name: N/A    Number of children: N/A    Years of education: N/A     Occupational History    Not on file.     Social History Main Topics    Smoking status: Former Smoker      Packs/day: 0.25     Quit date: 7/5/1997    Smokeless tobacco: Never Used    Alcohol use No    Drug use: No    Sexual activity: Yes     Other Topics Concern    Not on file     Social History Narrative    No narrative on file     He eats a healthy diet, works out at the gym regularly    PHYSICAL EXAM:  VITAL SIGNS:   There were no vitals filed for this visit.  GENERAL:  Patient appears well nourished, sitting on exam table, in no acute distress.  HEENT:  Atraumatic, normocephalic, PERRLA, EOMI, no conjunctival injection, sclerae are anicteric, normal external auditory canals,TMs clear b/l, gross hearing intact to whisper, MMM, no oropharygneal erythema or exudate.  NECK:  Supple, normal ROM, trachea is midline , no supraclavicular or cervical LAD or masses palpated.  Thyroid gland not palpable.  CARDIOVASCULAR:  RRR, normal S1 and S2, no m/r/g.  RESPIRATORY:  CTA b/l, no wheezes, rhonchi, rales.  No increased work of breathing, no  use of accessory muscles.  ABDOMEN:  Soft, nontender, nondistended, normoactive bowel sounds in all four quadrants, no rebound or guarding, no HSM or masses palpated.  Normal percussion.  EXTREMITIES:  2+ DP pulses b/l, no edema.  SKIN:  Warm, no lesions on exposed skin.  NEUROMUSCULAR:  Cranial nerves II-XII grossly intact.  No clubbing or cyanosis of digits/nails.  Steady gait.  PSYCH:  Patient is alert and oriented to person, time, place. They are appropriately dressed and groomed. There is normal eye contact. Rate and tone of speech is normal. Normal insight, judgement. Normal thought content and process.     LABORATORY/IMAGING STUDIES: pending    ASSESSMENT/PLAN: This is a 77 y.o. male who presents to clinic for routine medical exam  1. Hyperlipidemia, mixed  - Comprehensive metabolic panel; Future  - Lipid panel; Future  -CBC  -TSH    2. Hyperglycemia  -FBG, HgA1c    3. Screening prostate cancer  Screening PSA ordered          Anabella Mcintyre MD

## 2018-03-01 ENCOUNTER — LAB VISIT (OUTPATIENT)
Dept: LAB | Facility: HOSPITAL | Age: 78
End: 2018-03-01
Attending: FAMILY MEDICINE
Payer: MEDICARE

## 2018-03-01 DIAGNOSIS — Z12.5 SCREENING FOR MALIGNANT NEOPLASM OF PROSTATE: ICD-10-CM

## 2018-03-01 DIAGNOSIS — R73.9 HYPERGLYCEMIA: ICD-10-CM

## 2018-03-01 DIAGNOSIS — E78.2 HYPERLIPIDEMIA, MIXED: ICD-10-CM

## 2018-03-01 LAB
ALBUMIN SERPL BCP-MCNC: 3.5 G/DL
ALP SERPL-CCNC: 89 U/L
ALT SERPL W/O P-5'-P-CCNC: 19 U/L
ANION GAP SERPL CALC-SCNC: 11 MMOL/L
AST SERPL-CCNC: 18 U/L
BASOPHILS # BLD AUTO: 0.03 K/UL
BASOPHILS NFR BLD: 0.3 %
BILIRUB SERPL-MCNC: 1.8 MG/DL
BUN SERPL-MCNC: 19 MG/DL
CALCIUM SERPL-MCNC: 9.3 MG/DL
CHLORIDE SERPL-SCNC: 107 MMOL/L
CHOLEST SERPL-MCNC: 139 MG/DL
CHOLEST/HDLC SERPL: 3.7 {RATIO}
CO2 SERPL-SCNC: 25 MMOL/L
COMPLEXED PSA SERPL-MCNC: 0.65 NG/ML
CREAT SERPL-MCNC: 1.1 MG/DL
DIFFERENTIAL METHOD: ABNORMAL
EOSINOPHIL # BLD AUTO: 0.3 K/UL
EOSINOPHIL NFR BLD: 3.5 %
ERYTHROCYTE [DISTWIDTH] IN BLOOD BY AUTOMATED COUNT: 13 %
EST. GFR  (AFRICAN AMERICAN): >60 ML/MIN/1.73 M^2
EST. GFR  (NON AFRICAN AMERICAN): >60 ML/MIN/1.73 M^2
ESTIMATED AVG GLUCOSE: 114 MG/DL
GLUCOSE SERPL-MCNC: 117 MG/DL
HBA1C MFR BLD HPLC: 5.6 %
HCT VFR BLD AUTO: 46 %
HDLC SERPL-MCNC: 38 MG/DL
HDLC SERPL: 27.3 %
HGB BLD-MCNC: 15.6 G/DL
IMM GRANULOCYTES # BLD AUTO: 0.07 K/UL
IMM GRANULOCYTES NFR BLD AUTO: 0.7 %
LDLC SERPL CALC-MCNC: 79.6 MG/DL
LYMPHOCYTES # BLD AUTO: 2.8 K/UL
LYMPHOCYTES NFR BLD: 28.6 %
MCH RBC QN AUTO: 31.6 PG
MCHC RBC AUTO-ENTMCNC: 33.9 G/DL
MCV RBC AUTO: 93 FL
MONOCYTES # BLD AUTO: 0.6 K/UL
MONOCYTES NFR BLD: 6.6 %
NEUTROPHILS # BLD AUTO: 5.9 K/UL
NEUTROPHILS NFR BLD: 60.3 %
NONHDLC SERPL-MCNC: 101 MG/DL
NRBC BLD-RTO: 0 /100 WBC
PLATELET # BLD AUTO: 251 K/UL
PMV BLD AUTO: 12.1 FL
POTASSIUM SERPL-SCNC: 4.7 MMOL/L
PROT SERPL-MCNC: 7 G/DL
RBC # BLD AUTO: 4.93 M/UL
SODIUM SERPL-SCNC: 143 MMOL/L
TRIGL SERPL-MCNC: 107 MG/DL
TSH SERPL DL<=0.005 MIU/L-ACNC: 2.23 UIU/ML
WBC # BLD AUTO: 9.76 K/UL

## 2018-03-01 PROCEDURE — 83036 HEMOGLOBIN GLYCOSYLATED A1C: CPT

## 2018-03-01 PROCEDURE — 85025 COMPLETE CBC W/AUTO DIFF WBC: CPT

## 2018-03-01 PROCEDURE — 84443 ASSAY THYROID STIM HORMONE: CPT

## 2018-03-01 PROCEDURE — 80061 LIPID PANEL: CPT

## 2018-03-01 PROCEDURE — 36415 COLL VENOUS BLD VENIPUNCTURE: CPT | Mod: PO

## 2018-03-01 PROCEDURE — 80053 COMPREHEN METABOLIC PANEL: CPT

## 2018-03-01 PROCEDURE — 84153 ASSAY OF PSA TOTAL: CPT

## 2018-03-02 RX ORDER — ATORVASTATIN CALCIUM 20 MG/1
20 TABLET, FILM COATED ORAL DAILY
Qty: 90 TABLET | Refills: 3 | Status: SHIPPED | OUTPATIENT
Start: 2018-03-02 | End: 2019-01-21 | Stop reason: SDUPTHER

## 2018-03-02 NOTE — TELEPHONE ENCOUNTER
----- Message from Emilee Patel sent at 3/2/2018  9:56 AM CST -----  Patient states he needs to add a new mail order pharmacy to his chart please call [phone]

## 2018-03-15 ENCOUNTER — DOCUMENTATION ONLY (OUTPATIENT)
Dept: FAMILY MEDICINE | Facility: CLINIC | Age: 78
End: 2018-03-15

## 2018-03-15 ENCOUNTER — OFFICE VISIT (OUTPATIENT)
Dept: FAMILY MEDICINE | Facility: CLINIC | Age: 78
End: 2018-03-15
Payer: MEDICARE

## 2018-03-15 VITALS
WEIGHT: 187.38 LBS | BODY MASS INDEX: 26.23 KG/M2 | DIASTOLIC BLOOD PRESSURE: 85 MMHG | SYSTOLIC BLOOD PRESSURE: 139 MMHG | HEART RATE: 72 BPM | HEIGHT: 71 IN | TEMPERATURE: 98 F

## 2018-03-15 DIAGNOSIS — R10.9 RIGHT FLANK PAIN: Primary | ICD-10-CM

## 2018-03-15 DIAGNOSIS — B02.9 HERPES ZOSTER WITHOUT COMPLICATION: ICD-10-CM

## 2018-03-15 LAB
BACTERIA #/AREA URNS AUTO: NORMAL /HPF
BILIRUB SERPL-MCNC: ABNORMAL MG/DL
BILIRUB UR QL STRIP: NEGATIVE
BLOOD URINE, POC: ABNORMAL
CLARITY UR REFRACT.AUTO: CLEAR
COLOR UR AUTO: YELLOW
COLOR, POC UA: YELLOW
GLUCOSE UR QL STRIP: ABNORMAL
GLUCOSE UR QL STRIP: NEGATIVE
HGB UR QL STRIP: NEGATIVE
KETONES UR QL STRIP: ABNORMAL
KETONES UR QL STRIP: NEGATIVE
LEUKOCYTE ESTERASE UR QL STRIP: NEGATIVE
LEUKOCYTE ESTERASE URINE, POC: ABNORMAL
MICROSCOPIC COMMENT: NORMAL
NITRITE UR QL STRIP: NEGATIVE
NITRITE, POC UA: ABNORMAL
PH UR STRIP: 5 [PH] (ref 5–8)
PH, POC UA: 5
PROT UR QL STRIP: NEGATIVE
PROTEIN, POC: ABNORMAL
RBC #/AREA URNS AUTO: 1 /HPF (ref 0–4)
SP GR UR STRIP: 1.02 (ref 1–1.03)
SPECIFIC GRAVITY, POC UA: 1.02
SQUAMOUS #/AREA URNS AUTO: 0 /HPF
URN SPEC COLLECT METH UR: NORMAL
UROBILINOGEN UR STRIP-ACNC: NEGATIVE EU/DL
UROBILINOGEN, POC UA: ABNORMAL
WBC #/AREA URNS AUTO: 0 /HPF (ref 0–5)

## 2018-03-15 PROCEDURE — 99214 OFFICE O/P EST MOD 30 MIN: CPT | Mod: 25,S$GLB,, | Performed by: PHYSICIAN ASSISTANT

## 2018-03-15 PROCEDURE — 81002 URINALYSIS NONAUTO W/O SCOPE: CPT | Mod: S$GLB,,, | Performed by: PHYSICIAN ASSISTANT

## 2018-03-15 PROCEDURE — 99999 PR PBB SHADOW E&M-EST. PATIENT-LVL V: CPT | Mod: PBBFAC,,, | Performed by: PHYSICIAN ASSISTANT

## 2018-03-15 PROCEDURE — 87086 URINE CULTURE/COLONY COUNT: CPT

## 2018-03-15 PROCEDURE — 81001 URINALYSIS AUTO W/SCOPE: CPT

## 2018-03-15 RX ORDER — GABAPENTIN 100 MG/1
100 CAPSULE ORAL 3 TIMES DAILY PRN
Qty: 45 CAPSULE | Refills: 0 | Status: SHIPPED | OUTPATIENT
Start: 2018-03-15 | End: 2018-03-26

## 2018-03-15 RX ORDER — VALACYCLOVIR HYDROCHLORIDE 1 G/1
1000 TABLET, FILM COATED ORAL 3 TIMES DAILY
Qty: 21 TABLET | Refills: 0 | Status: SHIPPED | OUTPATIENT
Start: 2018-03-15 | End: 2018-03-26

## 2018-03-15 NOTE — PROGRESS NOTES
"Subjective:       Patient ID: Willie Liao is a 77 y.o. male.    Chief Complaint: Back Pain    Mr. Liao is a 77 year old male who presents to clinic for evaluation of right flank pain. He complains of 4 day history of right sided flank pain that has radiated to his side over the last 2-3 days. He states pain is intermittent and most noticeable at rest. Pain is described as an aching 5/10. He complains of a "blood flow" sensation across his chest, which he can not describe further. He denies pain with activity; although, he recently did lift a bag of heavy rocks. He has been applying heating pad with some relief. He denies fever/chills, nausea, vomiting, diarrhea, or constipation. He denies association of the pain with food, dysuria, hematuria, or radiation to the groin. He denies chest pain or shortness of breath. He states redness on the back is related to leaving the heating pad on too long. He has never had shingles before or the zoster vaccine.      Review of Systems   Constitutional: Negative for chills, fatigue and fever.   Eyes: Negative for visual disturbance.   Respiratory: Negative for cough, shortness of breath and wheezing.    Cardiovascular: Negative for chest pain, palpitations and leg swelling.   Gastrointestinal: Negative for abdominal pain, constipation, diarrhea, nausea and vomiting.   Musculoskeletal: Positive for myalgias. Negative for arthralgias.   Skin: Positive for rash.   Neurological: Negative for dizziness, syncope and headaches.   Hematological: Negative for adenopathy.       Objective:      Vitals:    03/15/18 0724   BP: 139/85   Pulse: 72   Temp: 97.9 °F (36.6 °C)     Physical Exam   Constitutional: He is oriented to person, place, and time. He appears well-developed and well-nourished.   HENT:   Head: Normocephalic and atraumatic.   Eyes: Conjunctivae and EOM are normal. Pupils are equal, round, and reactive to light.   Cardiovascular: Normal rate, regular rhythm, normal " heart sounds and intact distal pulses.    Pulmonary/Chest: Effort normal and breath sounds normal.   Neurological: He is alert and oriented to person, place, and time.   Skin: Skin is warm and dry.            Assessment:       1. Right flank pain    2. Herpes zoster without complication        Plan:       Right flank pain  -     Urinalysis  -     Urine culture  -     POCT urine dipstick without microscope. Trace blood on dipstick, will send for further analysis.     Herpes zoster without complication  -     valACYclovir (VALTREX) 1000 MG tablet; Take 1 tablet (1,000 mg total) by mouth 3 (three) times daily.  Dispense: 21 tablet; Refill: 0  -     gabapentin (NEURONTIN) 100 MG capsule; Take 1 capsule (100 mg total) by mouth 3 (three) times daily as needed.  Dispense: 45 capsule; Refill: 0 May taken 2 tabs a night if pain is severe.      Return to clinic if symptoms worsen or do not improve with treatment

## 2018-03-15 NOTE — PROGRESS NOTES
Pre-Visit Chart Review  For Appointment Scheduled on 3/15/2018    There are no preventive care reminders to display for this patient.

## 2018-03-16 LAB — BACTERIA UR CULT: NORMAL

## 2018-03-19 ENCOUNTER — TELEPHONE (OUTPATIENT)
Dept: FAMILY MEDICINE | Facility: CLINIC | Age: 78
End: 2018-03-19

## 2018-03-19 NOTE — TELEPHONE ENCOUNTER
----- Message from Leilani Araujo MA sent at 3/19/2018  5:19 PM CDT -----  Patient is still having severe pain on his right side.   ----- Message -----  From: Cornelia Russell PA-C  Sent: 3/19/2018   3:25 PM  To: [facility] Staff    Urine culture is negative. How is his back?

## 2018-03-22 ENCOUNTER — DOCUMENTATION ONLY (OUTPATIENT)
Dept: FAMILY MEDICINE | Facility: CLINIC | Age: 78
End: 2018-03-22

## 2018-03-22 NOTE — PROGRESS NOTES
Pre-Visit Chart Review  For Appointment Scheduled on 3/23/2018    There are no preventive care reminders to display for this patient.

## 2018-03-26 ENCOUNTER — OFFICE VISIT (OUTPATIENT)
Dept: FAMILY MEDICINE | Facility: CLINIC | Age: 78
End: 2018-03-26
Payer: MEDICARE

## 2018-03-26 ENCOUNTER — DOCUMENTATION ONLY (OUTPATIENT)
Dept: FAMILY MEDICINE | Facility: CLINIC | Age: 78
End: 2018-03-26

## 2018-03-26 VITALS
TEMPERATURE: 98 F | SYSTOLIC BLOOD PRESSURE: 124 MMHG | HEIGHT: 71 IN | DIASTOLIC BLOOD PRESSURE: 75 MMHG | BODY MASS INDEX: 26.17 KG/M2 | HEART RATE: 77 BPM | WEIGHT: 186.94 LBS

## 2018-03-26 DIAGNOSIS — B02.29 POST HERPETIC NEURALGIA: Primary | ICD-10-CM

## 2018-03-26 DIAGNOSIS — M54.50 ACUTE RIGHT-SIDED LOW BACK PAIN WITHOUT SCIATICA: ICD-10-CM

## 2018-03-26 PROCEDURE — 99213 OFFICE O/P EST LOW 20 MIN: CPT | Mod: S$GLB,,, | Performed by: PHYSICIAN ASSISTANT

## 2018-03-26 PROCEDURE — 99999 PR PBB SHADOW E&M-EST. PATIENT-LVL IV: CPT | Mod: PBBFAC,,, | Performed by: PHYSICIAN ASSISTANT

## 2018-03-26 RX ORDER — LIDOCAINE 50 MG/G
1 PATCH TOPICAL DAILY
Qty: 15 PATCH | Refills: 0 | Status: SHIPPED | OUTPATIENT
Start: 2018-03-26 | End: 2018-04-30 | Stop reason: SDUPTHER

## 2018-03-26 RX ORDER — GABAPENTIN 300 MG/1
300 CAPSULE ORAL 3 TIMES DAILY
Qty: 90 CAPSULE | Refills: 0 | Status: SHIPPED | OUTPATIENT
Start: 2018-03-26 | End: 2018-04-12 | Stop reason: SDUPTHER

## 2018-03-26 NOTE — PROGRESS NOTES
Pre-Visit Chart Review  For Appointment Scheduled on 3/26/2018    There are no preventive care reminders to display for this patient.

## 2018-03-26 NOTE — PROGRESS NOTES
Subjective:       Patient ID: Willie Liao is a 77 y.o. male.    Chief Complaint: follow up right flank pain    Mr. Liao is a 77 year old male who presents to clinic for follow up on shingles rash. He was seen in clinic 10 days ago with complaint of flank pain and rash and was diagnosed with shingles. Since then, he completed valtrex course and started gabapentin 100 mg TID with minimal relief. Rash is resolving, but pain has persisted. He took oxycodone/acet 7.5/325 mg which he had left over from a prior surgery since pain was keeping him up at night. He also complains of right sided low back pain, but denies exacerbation with movement.       Review of Systems   Constitutional: Negative for chills, fatigue and fever.   Eyes: Negative for visual disturbance.   Respiratory: Negative for cough, shortness of breath and wheezing.    Cardiovascular: Negative for chest pain, palpitations and leg swelling.   Gastrointestinal: Negative for abdominal pain, constipation, diarrhea, nausea and vomiting.   Musculoskeletal: Negative for arthralgias and myalgias.   Skin: Positive for rash.   Neurological: Negative for dizziness, syncope and headaches.   Hematological: Negative for adenopathy.       Objective:      Vitals:    03/26/18 1223   BP: 124/75   Pulse: 77   Temp: 97.7 °F (36.5 °C)     Physical Exam   Constitutional: He is oriented to person, place, and time. He appears well-developed and well-nourished.   HENT:   Head: Normocephalic and atraumatic.   Eyes: Conjunctivae and EOM are normal. Pupils are equal, round, and reactive to light.   Cardiovascular: Normal rate, regular rhythm, normal heart sounds and intact distal pulses.    Pulmonary/Chest: Effort normal and breath sounds normal.   Neurological: He is alert and oriented to person, place, and time.   Skin: Skin is warm.            Assessment:       1. Post herpetic neuralgia    2. Acute right-sided low back pain without sciatica        Plan:       Post  herpetic neuralgia  -     Increase gabapentin (NEURONTIN) 300 MG capsule; Take 1 capsule (300 mg total) by mouth 3 (three) times daily.  Dispense: 90 capsule; Refill: 0. May move doses toward evening if no side effects from higher dose medication.   -     lidocaine (LIDODERM) 5 %; Place 1 patch onto the skin once daily. Remove & Discard patch within 12 hours or as directed by MD  Dispense: 15 patch; Refill: 0    Acute right-sided low back pain without sciatica        - Continue to monitor    Follow up in 1 month

## 2018-04-12 ENCOUNTER — TELEPHONE (OUTPATIENT)
Dept: FAMILY MEDICINE | Facility: CLINIC | Age: 78
End: 2018-04-12

## 2018-04-12 DIAGNOSIS — B02.29 POST HERPETIC NEURALGIA: ICD-10-CM

## 2018-04-12 RX ORDER — GABAPENTIN 300 MG/1
600 CAPSULE ORAL 3 TIMES DAILY
Qty: 90 CAPSULE | Refills: 0 | Status: SHIPPED | OUTPATIENT
Start: 2018-04-12 | End: 2018-04-23 | Stop reason: SDUPTHER

## 2018-04-12 RX ORDER — GABAPENTIN 300 MG/1
300 CAPSULE ORAL 3 TIMES DAILY
Qty: 90 CAPSULE | Refills: 0 | Status: SHIPPED | OUTPATIENT
Start: 2018-04-12 | End: 2018-04-12 | Stop reason: SDUPTHER

## 2018-04-12 NOTE — TELEPHONE ENCOUNTER
----- Message from Leilani Araujo MA sent at 4/12/2018  3:41 PM CDT -----  Contact: Dannielle with [facility]      ----- Message -----  From: Lillian Wagoner  Sent: 4/12/2018   3:34 PM  To: Drew [facility] Staff    The pharmacy is calling to see if the doctor could send over or call with an updated prescription with new directions on patient's gabapentin (NEURONTIN) 300 MG capsule, stating that he can take up to 600MG, 3 times daily if this is what the doctor told him he could do for his shingles.  Call Back#[phone]  Thanks     [facility] [id number] - [address] AT [facility] [address]  [address] [address]  Phone: [phone] Fax: [phone]

## 2018-04-23 ENCOUNTER — TELEPHONE (OUTPATIENT)
Dept: FAMILY MEDICINE | Facility: CLINIC | Age: 78
End: 2018-04-23

## 2018-04-23 DIAGNOSIS — B02.29 POST HERPETIC NEURALGIA: ICD-10-CM

## 2018-04-23 RX ORDER — GABAPENTIN 300 MG/1
600 CAPSULE ORAL 3 TIMES DAILY
Qty: 180 CAPSULE | Refills: 0 | Status: SHIPPED | OUTPATIENT
Start: 2018-04-23 | End: 2018-05-14 | Stop reason: SDUPTHER

## 2018-04-27 ENCOUNTER — DOCUMENTATION ONLY (OUTPATIENT)
Dept: FAMILY MEDICINE | Facility: CLINIC | Age: 78
End: 2018-04-27

## 2018-04-27 NOTE — PROGRESS NOTES
Pre-Visit Chart Review  For Appointment Scheduled on 4/30/2018    There are no preventive care reminders to display for this patient.

## 2018-04-30 ENCOUNTER — OFFICE VISIT (OUTPATIENT)
Dept: FAMILY MEDICINE | Facility: CLINIC | Age: 78
End: 2018-04-30
Payer: MEDICARE

## 2018-04-30 VITALS
BODY MASS INDEX: 26.94 KG/M2 | HEIGHT: 71 IN | TEMPERATURE: 98 F | HEART RATE: 72 BPM | DIASTOLIC BLOOD PRESSURE: 91 MMHG | SYSTOLIC BLOOD PRESSURE: 151 MMHG | WEIGHT: 192.44 LBS

## 2018-04-30 DIAGNOSIS — B02.29 POST HERPETIC NEURALGIA: Primary | ICD-10-CM

## 2018-04-30 DIAGNOSIS — R03.0 ELEVATED BP WITHOUT DIAGNOSIS OF HYPERTENSION: ICD-10-CM

## 2018-04-30 PROCEDURE — 99999 PR PBB SHADOW E&M-EST. PATIENT-LVL IV: CPT | Mod: PBBFAC,,, | Performed by: PHYSICIAN ASSISTANT

## 2018-04-30 PROCEDURE — 99213 OFFICE O/P EST LOW 20 MIN: CPT | Mod: S$GLB,,, | Performed by: PHYSICIAN ASSISTANT

## 2018-04-30 RX ORDER — LIDOCAINE 50 MG/G
1 PATCH TOPICAL DAILY
Qty: 15 PATCH | Refills: 3 | Status: SHIPPED | OUTPATIENT
Start: 2018-04-30 | End: 2018-09-19

## 2018-04-30 RX ORDER — PANTOPRAZOLE SODIUM 40 MG/1
TABLET, DELAYED RELEASE ORAL
COMMUNITY
End: 2018-04-30 | Stop reason: ALTCHOICE

## 2018-04-30 RX ORDER — SIMVASTATIN 20 MG/1
TABLET, FILM COATED ORAL
COMMUNITY
End: 2018-09-19

## 2018-04-30 NOTE — Clinical Note
Please call and schedule for nursing  BP check in 2 weeks. Please let me know when you have them on the phone- I forgot to mention something to him.

## 2018-04-30 NOTE — PROGRESS NOTES
Subjective:       Patient ID: Willie Liao is a 77 y.o. male.    Chief Complaint: Follow-up    Mr. Liao is a 77 year old male who presents to clinic for follow up on postherpetic neuralgia. Since his last visit, his rash has continued to heal and there are no lesion on the chest or back. He states pain has decreased from 7/10 to 4/10 with additional of lidocaine patches during the day and gabapentin 600 mg TID. He has not been as physically active the last 2 months due to pain. He has gained approximately 5 lbs and his BP is elevated today. He denies chest pain, shortness of breath, or leg swelling.      Review of Systems   Constitutional: Negative for chills, fatigue and fever.   Eyes: Negative for visual disturbance.   Respiratory: Negative for cough, shortness of breath and wheezing.    Cardiovascular: Negative for chest pain, palpitations and leg swelling.   Gastrointestinal: Negative for abdominal pain, constipation, diarrhea, nausea and vomiting.   Musculoskeletal: Negative for arthralgias and myalgias.   Neurological: Negative for dizziness, syncope and headaches.        Pain   Hematological: Negative for adenopathy.       Objective:      Vitals:    04/30/18 0944   BP: (!) 151/91   Pulse: 72   Temp:      Physical Exam   Constitutional: He is oriented to person, place, and time. He appears well-developed and well-nourished.   HENT:   Head: Normocephalic and atraumatic.   Eyes: Conjunctivae and EOM are normal. Pupils are equal, round, and reactive to light.   Cardiovascular: Normal rate, regular rhythm, normal heart sounds and intact distal pulses.    Pulmonary/Chest: Effort normal and breath sounds normal.   Neurological: He is alert and oriented to person, place, and time.   Skin: Skin is warm and dry.   No evidence of lesions on the chest/back. Pt is wearing a lidocaine patch under the right breast.       Assessment:       1. Post herpetic neuralgia    2. Elevated BP without diagnosis of  hypertension        Plan:       Post herpetic neuralgia  -     lidocaine (LIDODERM) 5 %; Place 1 patch onto the skin once daily. Remove & Discard patch within 12 hours or as directed by MD  Dispense: 15 patch; Refill: 3  - Do not use more than one lidoderm patch at one time  - Continue gabapentin 600 mg TID daily    Elevated BP without diagnosis of hypertension        - I counseled the patient on HTN education, management and recommendations.  I recommended weight loss toward a BMI < 25, avoidance of salt and the DASH diet, regular cardio exercise a minimum of 150 minutes per week and medications if indicated.  Printed materials were given.         - Nursing BP check in 2 weeks. Goal <140/90    Follow up with Dr. Mcintyre in September as scheduled

## 2018-05-07 ENCOUNTER — TELEPHONE (OUTPATIENT)
Dept: FAMILY MEDICINE | Facility: CLINIC | Age: 78
End: 2018-05-07

## 2018-05-07 NOTE — TELEPHONE ENCOUNTER
Called pt regard Lidocaine PA. Informed pt PA was submitted and approved. Case ID:[id number] Start Date 4/7/18 End Date 5/7/19. Pt verbalized understanding with no further questions.

## 2018-05-08 ENCOUNTER — TELEPHONE (OUTPATIENT)
Dept: FAMILY MEDICINE | Facility: CLINIC | Age: 78
End: 2018-05-08

## 2018-05-14 ENCOUNTER — DOCUMENTATION ONLY (OUTPATIENT)
Dept: FAMILY MEDICINE | Facility: CLINIC | Age: 78
End: 2018-05-14

## 2018-05-14 ENCOUNTER — CLINICAL SUPPORT (OUTPATIENT)
Dept: FAMILY MEDICINE | Facility: CLINIC | Age: 78
End: 2018-05-14
Payer: MEDICARE

## 2018-05-14 VITALS — DIASTOLIC BLOOD PRESSURE: 82 MMHG | SYSTOLIC BLOOD PRESSURE: 133 MMHG | HEART RATE: 75 BPM

## 2018-05-14 DIAGNOSIS — R03.0 ELEVATED BP WITHOUT DIAGNOSIS OF HYPERTENSION: Primary | ICD-10-CM

## 2018-05-14 DIAGNOSIS — B02.29 POST HERPETIC NEURALGIA: ICD-10-CM

## 2018-05-14 PROCEDURE — 99999 PR PBB SHADOW E&M-EST. PATIENT-LVL III: CPT | Mod: PBBFAC,,, | Performed by: PHYSICIAN ASSISTANT

## 2018-05-14 RX ORDER — GABAPENTIN 300 MG/1
600 CAPSULE ORAL 3 TIMES DAILY
Qty: 180 CAPSULE | Refills: 5 | Status: SHIPPED | OUTPATIENT
Start: 2018-05-14 | End: 2018-09-19

## 2018-05-14 NOTE — PROGRESS NOTES
Pre-Visit Chart Review  For Appointment Scheduled on 5/14/2018    There are no preventive care reminders to display for this patient.

## 2018-05-14 NOTE — NURSING
2 patient identifiers used (name & ). Patient came in for nurse blood pressure check. Automatic reading was 133/82 P-75. Right arm resting on desk, feet flat on floor, back straight. Patient had no c/o pain. Patient stated that they took their prescribed medication this am. Allergies and medications reviewed with the patient.Patient advised to continue taking medications as prescribed and follow up as scheduled.

## 2018-05-14 NOTE — PROGRESS NOTES
Patient came in for nurse blood pressure check. Automatic reading was 133/82 P-75. Right arm resting on desk, feet flat on floor, back straight. Patient had no c/o pain. Patient stated that they took their prescribed medication this am. Allergies and medications reviewed with the patient.Patient advised to continue taking medications as prescribed and follow up as scheduled.

## 2018-05-15 DIAGNOSIS — B02.29 POST HERPETIC NEURALGIA: ICD-10-CM

## 2018-05-16 RX ORDER — GABAPENTIN 300 MG/1
600 CAPSULE ORAL 3 TIMES DAILY
Qty: 180 CAPSULE | Refills: 5 | OUTPATIENT
Start: 2018-05-16 | End: 2019-05-16

## 2018-06-26 DIAGNOSIS — I65.23 BILATERAL CAROTID ARTERY STENOSIS: Primary | ICD-10-CM

## 2018-07-12 ENCOUNTER — OFFICE VISIT (OUTPATIENT)
Dept: ORTHOPEDICS | Facility: CLINIC | Age: 78
End: 2018-07-12
Payer: MEDICARE

## 2018-07-12 VITALS
HEART RATE: 67 BPM | BODY MASS INDEX: 25.9 KG/M2 | SYSTOLIC BLOOD PRESSURE: 157 MMHG | WEIGHT: 185 LBS | DIASTOLIC BLOOD PRESSURE: 83 MMHG | HEIGHT: 71 IN

## 2018-07-12 DIAGNOSIS — M17.12 PRIMARY OSTEOARTHRITIS OF LEFT KNEE: ICD-10-CM

## 2018-07-12 DIAGNOSIS — M79.671 PAIN OF RIGHT HEEL: ICD-10-CM

## 2018-07-12 DIAGNOSIS — M17.0 PRIMARY OSTEOARTHRITIS OF BOTH KNEES: Primary | ICD-10-CM

## 2018-07-12 DIAGNOSIS — M17.11 PRIMARY OSTEOARTHRITIS OF RIGHT KNEE: ICD-10-CM

## 2018-07-12 PROCEDURE — 73630 X-RAY EXAM OF FOOT: CPT | Mod: RT,,, | Performed by: ORTHOPAEDIC SURGERY

## 2018-07-12 PROCEDURE — 73562 X-RAY EXAM OF KNEE 3: CPT | Mod: 50,,, | Performed by: ORTHOPAEDIC SURGERY

## 2018-07-12 PROCEDURE — 20610 DRAIN/INJ JOINT/BURSA W/O US: CPT | Mod: 50,,, | Performed by: ORTHOPAEDIC SURGERY

## 2018-07-12 PROCEDURE — 99213 OFFICE O/P EST LOW 20 MIN: CPT | Mod: 25,,, | Performed by: ORTHOPAEDIC SURGERY

## 2018-07-12 RX ORDER — METHYLPREDNISOLONE ACETATE 40 MG/ML
40 INJECTION, SUSPENSION INTRA-ARTICULAR; INTRALESIONAL; INTRAMUSCULAR; SOFT TISSUE
Status: DISCONTINUED | OUTPATIENT
Start: 2018-07-12 | End: 2018-07-12 | Stop reason: HOSPADM

## 2018-07-12 RX ADMIN — METHYLPREDNISOLONE ACETATE 40 MG: 40 INJECTION, SUSPENSION INTRA-ARTICULAR; INTRALESIONAL; INTRAMUSCULAR; SOFT TISSUE at 10:07

## 2018-07-12 NOTE — PROCEDURES
Large Joint Aspiration/Injection  Date/Time: 7/12/2018 10:27 AM  Performed by: YOLANDA GOODMAN  Authorized by: YOLANDA GOODMAN     Consent Done?:  Yes (Verbal)  Indications:  Pain  Procedure site marked: Yes    Timeout: Prior to procedure the correct patient, procedure, and site was verified      Location:  Knee  Site:  R knee  Prep: Patient was prepped and draped in usual sterile fashion    Needle size:  25 G  Medications:  40 mg methylPREDNISolone acetate 40 mg/mL; 40 mg methylPREDNISolone acetate 40 mg/mL  Patient tolerance:  Patient tolerated the procedure well with no immediate complications  Large Joint Aspiration/Injection  Date/Time: 7/12/2018 10:28 AM  Performed by: YOLANDA GOODMAN  Authorized by: YOLANDA GOODMAN     Consent Done?:  Yes (Verbal)  Indications:  Pain  Procedure site marked: Yes    Timeout: Prior to procedure the correct patient, procedure, and site was verified      Location:  Knee  Site:  L knee  Prep: Patient was prepped and draped in usual sterile fashion    Needle size:  25 G  Medications:  40 mg methylPREDNISolone acetate 40 mg/mL; 40 mg methylPREDNISolone acetate 40 mg/mL  Patient tolerance:  Patient tolerated the procedure well with no immediate complications

## 2018-07-12 NOTE — PROGRESS NOTES
[facility]    Subjective:     Chief Complaint:   Chief Complaint   Patient presents with    Left Knee - Pain     Bilateral knee x awhile. States that he has issue stooping down. States that his knees feels tight when he bends down. States that his pain is about a 5 when he bends down.     Right Knee - Pain     Bilateral knee x awhile. States that he has issue stooping down. States that his knees feels tight when he bends down. States that his pain is about a 5 when he bends down.     Right Foot - Pain     Right foot heel pain x year. States that sometimes when he steps on it has a dull ache for the rest of the day. States that it is off and on. States that his pain is a 4 when it happens.        Past Medical History:   Diagnosis Date    Carotid stenosis     Gastritis     GERD (gastroesophageal reflux disease)     H. pylori infection     Hyperlipidemia     Shingles        Past Surgical History:   Procedure Laterality Date    arthroscope Left     knee arthroscopy, torn meniscus    COLONOSCOPY      Adrien       Current Outpatient Prescriptions   Medication Sig    aspirin (ECOTRIN) 81 MG EC tablet Take 1 tablet (81 mg total) by mouth once daily.    atorvastatin (LIPITOR) 20 MG tablet Take 1 tablet (20 mg total) by mouth once daily.    CALCIUM ORAL Take 1 tablet by mouth once daily.    CYANOCOBALAMIN, VITAMIN B-12, (VITAMIN B-12 ORAL) Take 1 tablet by mouth once daily.    gabapentin (NEURONTIN) 300 MG capsule Take 2 capsules (600 mg total) by mouth 3 (three) times daily.    lidocaine (LIDODERM) 5 % Place 1 patch onto the skin once daily. Remove & Discard patch within 12 hours or as directed by MD    MULTIVITAMIN WITH MINERALS/LUT (MULTIVITAMIN 50 PLUS ORAL) Take 1 tablet by mouth once daily.     OMEGA-3 FATTY ACIDS/FISH OIL (OMEGA 3 FISH OIL ORAL) Take 1 capsule by mouth once daily.    pantoprazole (PROTONIX) 40 MG tablet Take 40 mg by mouth once daily.    simvastatin (ZOCOR) 20 MG  tablet simvastatin 20 mg tablet   Take 1 tablet every day by oral route.    VITAMIN C 500 MG tablet      No current facility-administered medications for this visit.        Review of patient's allergies indicates:   Allergen Reactions    No known drug allergies        Family History   Problem Relation Age of Onset    Heart disease Father 65        MI    Hyperlipidemia Father     Stroke Father     COPD Mother     Glaucoma Mother     No Known Problems Sister     No Known Problems Brother     No Known Problems Daughter     Diabetes Unknown         uncles  and aunts     Cancer Paternal Uncle         prostate cancer       Social History     Social History    Marital status:      Spouse name: N/A    Number of children: N/A    Years of education: N/A     Occupational History    Not on file.     Social History Main Topics    Smoking status: Former Smoker     Packs/day: 0.25     Quit date: 7/5/1997    Smokeless tobacco: Never Used    Alcohol use No    Drug use: No    Sexual activity: Yes     Other Topics Concern    Not on file     Social History Narrative    No narrative on file       History of present illness: Patient comes in today for his bilateral knees. Is also got some intermittent right foot pain. The right foot pain is in the heel. The knee pain is on the medial aspects of his knees. He has long-standing osteoarthritis of his knees. He has had injections. The last one was over a year ago.      Review of Systems:    Constitution: Negative for chills, fever, and sweats.  Negative for unexplained weight loss.    HENT:  Negative for headaches and blurry vision.    Cardiovascular:Negative for chest pain or irregular heart beat. Negative for hypertension.    Respiratory:  Negative for cough and shortness of breath.    Gastrointestinal: Negative for abdominal pain, heartburn, melena, nausea, and vomitting.    Genitourinary:  Negative bladder incontinence and dysuria.    Musculoskeletal:  See  HPI for details.     Neurological: Negative for numbness.    Psychiatric/Behavioral: Negative for depression.  The patient is not nervous/anxious.      Endocrine: Negative for polyuria    Hematologic/Lymphatic: Negative for bleeding problem.  Does not bruise/bleed easily.    Skin: Negative for poor would healing and rash    Objective:      Physical Examination:    Vital Signs:    Vitals:    07/12/18 0946   BP: (!) 157/83   Pulse: 67       Body mass index is 26.17 kg/m².    This a well-developed, well nourished patient in no acute distress.  They are alert and oriented and cooperative to examination.        Patient has significant crepitus bilaterally. He is 1+ effusion on the right. His 1+ effusion on the left. He has medial joint line tenderness bilaterally he has varus deformities bilaterally. He has mild tenderness to palpation of the left heel. No tenderness to palpation of the right heel. Full range of motion of his bilateral subtalar joints and ankle joints.  Pertinent New Results:    XRAY Report / Interpretation:   AP lateral and oblique of the right foot done in the office today demonstrates no fracture subluxations or osteoporotic changes.    AP and lateral and sunrise views of his bilateral knees done today demonstrate severe osteoarthritis of his bilateral knees with loss of medial compartments in 3 compartments spurring    Assessment/Plan:      Osteoarthritis bilateral knees. I injected both his knees with Depo-Medrol and lidocaine. His heel pain is intermittent's we simply recommend a soft insert. He will follow-up when necessary      This note was created using Dragon voice recognition software that occasionally misinterpreted phrases or words.

## 2018-08-01 ENCOUNTER — TELEPHONE (OUTPATIENT)
Dept: FAMILY MEDICINE | Facility: CLINIC | Age: 78
End: 2018-08-01

## 2018-08-01 NOTE — TELEPHONE ENCOUNTER
Called pt-spoke to wife ([facility]). Need to reschedule appt with Dr. Mcintyre on 9/4/18. Dr. Mcintyre is not in clinic that day. Rescheduled to 9/19/18 with Dr. Mcintyre. Thanks, Luiza

## 2018-08-08 ENCOUNTER — HOSPITAL ENCOUNTER (OUTPATIENT)
Dept: RADIOLOGY | Facility: CLINIC | Age: 78
Discharge: HOME | End: 2018-08-08
Attending: THORACIC SURGERY (CARDIOTHORACIC VASCULAR SURGERY)
Payer: MEDICARE

## 2018-08-08 DIAGNOSIS — I65.23 BILATERAL CAROTID ARTERY STENOSIS: ICD-10-CM

## 2018-08-08 PROCEDURE — 93880 EXTRACRANIAL BILAT STUDY: CPT | Mod: TC,PO

## 2018-08-08 PROCEDURE — 93880 EXTRACRANIAL BILAT STUDY: CPT | Mod: 26,,, | Performed by: RADIOLOGY

## 2018-09-17 ENCOUNTER — DOCUMENTATION ONLY (OUTPATIENT)
Dept: FAMILY MEDICINE | Facility: CLINIC | Age: 78
End: 2018-09-17

## 2018-09-17 NOTE — PROGRESS NOTES
Pre-Visit Chart Review  For Appointment Scheduled on 9/19/2018    Health Maintenance Due   Topic Date Due    Influenza Vaccine  08/01/2018

## 2018-09-19 ENCOUNTER — OFFICE VISIT (OUTPATIENT)
Dept: FAMILY MEDICINE | Facility: CLINIC | Age: 78
End: 2018-09-19
Payer: MEDICARE

## 2018-09-19 VITALS
HEART RATE: 90 BPM | WEIGHT: 185.19 LBS | DIASTOLIC BLOOD PRESSURE: 84 MMHG | BODY MASS INDEX: 25.93 KG/M2 | SYSTOLIC BLOOD PRESSURE: 127 MMHG | TEMPERATURE: 99 F | HEIGHT: 71 IN

## 2018-09-19 DIAGNOSIS — Z00.00 ROUTINE MEDICAL EXAM: Primary | ICD-10-CM

## 2018-09-19 DIAGNOSIS — B02.29 POSTHERPETIC NEURALGIA: ICD-10-CM

## 2018-09-19 DIAGNOSIS — R73.9 HYPERGLYCEMIA: ICD-10-CM

## 2018-09-19 PROCEDURE — 99397 PER PM REEVAL EST PAT 65+ YR: CPT | Mod: S$PBB,,, | Performed by: FAMILY MEDICINE

## 2018-09-19 PROCEDURE — 99214 OFFICE O/P EST MOD 30 MIN: CPT | Mod: PBBFAC,PO | Performed by: FAMILY MEDICINE

## 2018-09-19 PROCEDURE — 99999 PR PBB SHADOW E&M-EST. PATIENT-LVL IV: CPT | Mod: PBBFAC,,, | Performed by: FAMILY MEDICINE

## 2018-09-19 RX ORDER — PREGABALIN 75 MG/1
75 CAPSULE ORAL 2 TIMES DAILY
Qty: 60 CAPSULE | Refills: 6 | Status: SHIPPED | OUTPATIENT
Start: 2018-09-19 | End: 2018-11-30

## 2018-09-19 RX ORDER — ACYCLOVIR 200 MG/1
CAPSULE ORAL
COMMUNITY
End: 2018-11-30

## 2018-09-19 NOTE — PROGRESS NOTES
CHIEF COMPLAINT:  Routine medical exam      HISTORY OF PRESENT ILLNESS:  Willie Liao is a 78 y.o. male who presents to clinic for a routine medical exam. He is up to date on his immunizations except influenza vaccine and shingrix. He follows up with his dentist, optometrist regularly. He had a normal TSH and screening PSA this year. He eats a healthy diet and works out at [employer]. He believes that he is up to date on his colonoscopy with Dr. Jurado. He continues to have right thoracic postherpetic neuralgia pain. He was on gabapentin which was ineffective.    REVIEW OF SYSTEMS:  The patient denies any fever, chills, night sweats, headaches, vision changes, difficulty speaking or swallowing, decreased hearing, weight loss, weight gain, chest pain, palpitations, , cough, nausea, vomiting, abdominal pain, dysuria, diarrhea, constipation, hematuria, hematochezia, melena, changes in his hair, skin, nails, numbness or weakness in his extremities, erythema, swelling or pain over any of his joints, myalgia, swollen glands, easy bruising, fatigue, edema.     MEDICATIONS:   Reviewed and/or reconciled in EPIC    ALLERGIES:  Reviewed and/or reconciled in [facility]    PAST MEDICAL/SURGICAL HISTORY:   Past Medical History:   Diagnosis Date    Carotid stenosis     Gastritis     GERD (gastroesophageal reflux disease)     H. pylori infection     Hyperlipidemia     Shingles       Past Surgical History:   Procedure Laterality Date    arthroscope Left     knee arthroscopy, torn meniscus    COLONOSCOPY      Adrien    COLONOSCOPY N/A 9/19/2013    Performed by [facility] at [facility]       FAMILY HISTORY:    Family History   Problem Relation Age of Onset    Heart disease Father 65        MI    Hyperlipidemia Father     Stroke Father     COPD Mother     Glaucoma Mother     No Known Problems Sister     No Known Problems Brother     No Known Problems Daughter     Diabetes Unknown         uncles  and aunts   "   Cancer Paternal Uncle         prostate cancer       SOCIAL HISTORY:    Social History     Socioeconomic History    Marital status:      Spouse name: Not on file    Number of children: Not on file    Years of education: Not on file    Highest education level: Not on file   Social Needs    Financial resource strain: Not on file    Food insecurity - worry: Not on file    Food insecurity - inability: Not on file    Transportation needs - medical: Not on file    Transportation needs - non-medical: Not on file   Occupational History    Not on file   Tobacco Use    Smoking status: Former Smoker     Packs/day: 0.25     Last attempt to quit: 1997     Years since quittin.2    Smokeless tobacco: Never Used   Substance and Sexual Activity    Alcohol use: No    Drug use: No    Sexual activity: Yes   Other Topics Concern    Not on file   Social History Narrative    Not on file     He eats a healthy diet, works out at the gym regularly  PHYSICAL EXAM:  VITAL SIGNS:   Vitals:    18 1556   BP: 127/84   BP Location: Left arm   Patient Position: Sitting   BP Method: Medium (Automatic)   Pulse: 90   Temp: 98.5 °F (36.9 °C)   TempSrc: Oral   Weight: 84 kg (185 lb 3 oz)   Height: 5' 10.5" (1.791 m)     GENERAL:  Patient appears well nourished, sitting on exam table, in no acute distress.  HEENT:  Atraumatic, normocephalic, PERRLA, EOMI, no conjunctival injection, sclerae are anicteric, normal external auditory canals,TMs clear b/l, gross hearing intact to whisper, MMM, no oropharygneal erythema or exudate.  NECK:  Supple, normal ROM, trachea is midline , no supraclavicular or cervical LAD or masses palpated.  Thyroid gland not palpable.  CARDIOVASCULAR:  RRR, normal S1 and S2, no m/r/g.  RESPIRATORY:  CTA b/l, no wheezes, rhonchi, rales.  No increased work of breathing, no  use of accessory muscles.  ABDOMEN:  Soft, nontender, nondistended, normoactive bowel sounds in all four quadrants, no " rebound or guarding, no HSM or masses palpated.  Normal percussion.  EXTREMITIES:  2+ DP pulses b/l, no edema.  SKIN:  Warm, no lesions on exposed skin.  NEUROMUSCULAR:  Cranial nerves II-XII grossly intact.  Strength is 4+/5 over upper and lower extremity flexors/extensors b/l, 2+ biceps and patellar reflexes b/l. No clubbing or cyanosis of digits/nails.  Steady gait.  PSYCH:  Patient is alert and oriented to person, time, place. They are appropriately dressed and groomed. There is normal eye contact. Rate and tone of speech is normal. Normal insight, judgement. Normal thought content and process.     LABORATORY/IMAGING STUDIES: pending    ASSESSMENT/PLAN: This is a 78 y.o. male who presents to clinic for routine medical exam  1. Routine medical exam, screening for prostate cancer, immunization due: We will check a screening CBC, CMP, fasting lipid panel. Patient will be notified of these results and the need for any further evaluation and treatment. He will follow up with his dentist/optometrist as scheduled. We discussed the importance of increasing his exercise, continuing with a healthy, well-balanced diet. Will obtain colonoscopy report from Dr. Jurado  2. Hyperglycemia: will obtain FBG, HgA1c  3. Post herpetic neuralgia: place on trial of lyrica, if not effective can consider TCA  4. He will get his influenza vaccine at [facility].    Anabella Mcintyre MD

## 2018-09-25 ENCOUNTER — LAB VISIT (OUTPATIENT)
Dept: LAB | Facility: HOSPITAL | Age: 78
End: 2018-09-25
Attending: FAMILY MEDICINE
Payer: MEDICARE

## 2018-09-25 DIAGNOSIS — Z00.00 ROUTINE MEDICAL EXAM: ICD-10-CM

## 2018-09-25 DIAGNOSIS — R73.9 HYPERGLYCEMIA: ICD-10-CM

## 2018-09-25 LAB
ALBUMIN SERPL BCP-MCNC: 3.7 G/DL
ALP SERPL-CCNC: 78 U/L
ALT SERPL W/O P-5'-P-CCNC: 19 U/L
ANION GAP SERPL CALC-SCNC: 6 MMOL/L
AST SERPL-CCNC: 19 U/L
BASOPHILS # BLD AUTO: 0.02 K/UL
BASOPHILS NFR BLD: 0.2 %
BILIRUB SERPL-MCNC: 2.1 MG/DL
BUN SERPL-MCNC: 15 MG/DL
CALCIUM SERPL-MCNC: 9.2 MG/DL
CHLORIDE SERPL-SCNC: 110 MMOL/L
CHOLEST SERPL-MCNC: 154 MG/DL
CHOLEST/HDLC SERPL: 3.9 {RATIO}
CO2 SERPL-SCNC: 25 MMOL/L
CREAT SERPL-MCNC: 0.9 MG/DL
DIFFERENTIAL METHOD: ABNORMAL
EOSINOPHIL # BLD AUTO: 0.2 K/UL
EOSINOPHIL NFR BLD: 1.9 %
ERYTHROCYTE [DISTWIDTH] IN BLOOD BY AUTOMATED COUNT: 12.5 %
EST. GFR  (AFRICAN AMERICAN): >60 ML/MIN/1.73 M^2
EST. GFR  (NON AFRICAN AMERICAN): >60 ML/MIN/1.73 M^2
ESTIMATED AVG GLUCOSE: 114 MG/DL
GLUCOSE SERPL-MCNC: 102 MG/DL
HBA1C MFR BLD HPLC: 5.6 %
HCT VFR BLD AUTO: 44.3 %
HDLC SERPL-MCNC: 39 MG/DL
HDLC SERPL: 25.3 %
HGB BLD-MCNC: 15.4 G/DL
IMM GRANULOCYTES # BLD AUTO: 0.02 K/UL
IMM GRANULOCYTES NFR BLD AUTO: 0.2 %
LDLC SERPL CALC-MCNC: 91.6 MG/DL
LYMPHOCYTES # BLD AUTO: 3 K/UL
LYMPHOCYTES NFR BLD: 37.3 %
MCH RBC QN AUTO: 32.5 PG
MCHC RBC AUTO-ENTMCNC: 34.8 G/DL
MCV RBC AUTO: 94 FL
MONOCYTES # BLD AUTO: 0.5 K/UL
MONOCYTES NFR BLD: 6 %
NEUTROPHILS # BLD AUTO: 4.4 K/UL
NEUTROPHILS NFR BLD: 54.4 %
NONHDLC SERPL-MCNC: 115 MG/DL
NRBC BLD-RTO: 0 /100 WBC
PLATELET # BLD AUTO: 225 K/UL
PMV BLD AUTO: 11.9 FL
POTASSIUM SERPL-SCNC: 4 MMOL/L
PROT SERPL-MCNC: 6.6 G/DL
RBC # BLD AUTO: 4.74 M/UL
SODIUM SERPL-SCNC: 141 MMOL/L
TRIGL SERPL-MCNC: 117 MG/DL
WBC # BLD AUTO: 8.04 K/UL

## 2018-09-25 PROCEDURE — 85025 COMPLETE CBC W/AUTO DIFF WBC: CPT

## 2018-09-25 PROCEDURE — 80053 COMPREHEN METABOLIC PANEL: CPT

## 2018-09-25 PROCEDURE — 36415 COLL VENOUS BLD VENIPUNCTURE: CPT | Mod: PO

## 2018-09-25 PROCEDURE — 83036 HEMOGLOBIN GLYCOSYLATED A1C: CPT

## 2018-09-25 PROCEDURE — 80061 LIPID PANEL: CPT

## 2018-11-16 ENCOUNTER — PES CALL (OUTPATIENT)
Dept: ADMINISTRATIVE | Facility: CLINIC | Age: 78
End: 2018-11-16

## 2018-11-28 ENCOUNTER — DOCUMENTATION ONLY (OUTPATIENT)
Dept: FAMILY MEDICINE | Facility: CLINIC | Age: 78
End: 2018-11-28

## 2018-11-29 NOTE — PROGRESS NOTES
Pre-Visit Chart Review  For Appointment Scheduled on 11/30/2018    Health Maintenance Due   Topic Date Due    Influenza Vaccine  08/01/2018

## 2018-11-30 ENCOUNTER — OFFICE VISIT (OUTPATIENT)
Dept: FAMILY MEDICINE | Facility: CLINIC | Age: 78
End: 2018-11-30
Payer: MEDICARE

## 2018-11-30 VITALS
WEIGHT: 186.5 LBS | HEART RATE: 76 BPM | DIASTOLIC BLOOD PRESSURE: 72 MMHG | BODY MASS INDEX: 26.11 KG/M2 | HEIGHT: 71 IN | SYSTOLIC BLOOD PRESSURE: 130 MMHG | TEMPERATURE: 98 F

## 2018-11-30 DIAGNOSIS — R73.9 HYPERGLYCEMIA: ICD-10-CM

## 2018-11-30 DIAGNOSIS — E80.4 GILBERT'S SYNDROME: ICD-10-CM

## 2018-11-30 DIAGNOSIS — I77.1 SUBCLAVIAN ARTERY STENOSIS, LEFT: ICD-10-CM

## 2018-11-30 DIAGNOSIS — I65.29 STENOSIS OF CAROTID ARTERY, UNSPECIFIED LATERALITY: ICD-10-CM

## 2018-11-30 DIAGNOSIS — Z00.00 ENCOUNTER FOR PREVENTIVE HEALTH EXAMINATION: Primary | ICD-10-CM

## 2018-11-30 DIAGNOSIS — K21.9 GASTROESOPHAGEAL REFLUX DISEASE WITHOUT ESOPHAGITIS: ICD-10-CM

## 2018-11-30 DIAGNOSIS — E78.2 HYPERLIPIDEMIA, MIXED: ICD-10-CM

## 2018-11-30 DIAGNOSIS — B02.29 POSTHERPETIC NEURALGIA: ICD-10-CM

## 2018-11-30 PROCEDURE — 99999 PR PBB SHADOW E&M-EST. PATIENT-LVL V: CPT | Mod: PBBFAC,,, | Performed by: PHYSICIAN ASSISTANT

## 2018-11-30 PROCEDURE — G0439 PPPS, SUBSEQ VISIT: HCPCS | Mod: S$GLB,,, | Performed by: PHYSICIAN ASSISTANT

## 2018-11-30 NOTE — PROGRESS NOTES
"Willie Liao presented for a  Medicare AWV and comprehensive Health Risk Assessment today. The following components were reviewed and updated:    · Medical history  · Family History  · Social history  · Allergies and Current Medications  · Health Risk Assessment  · Health Maintenance  · Care Team     ** See Completed Assessments for Annual Wellness Visit within the encounter summary.**       The following assessments were completed:  · Living Situation  · CAGE  · Depression Screening  · Timed Get Up and Go  · Whisper Test  · Cognitive Function Screening  · Nutrition Screening  · ADL Screening  · PAQ Screening    Vitals:    11/30/18 0820 11/30/18 0821   BP: (!) 144/92 130/72   BP Location: Left arm Right arm   Patient Position: Sitting Sitting   BP Method: Large (Automatic) Large (Manual)   Pulse: 76    Temp: 97.7 °F (36.5 °C)    TempSrc: Oral    Weight: 84.6 kg (186 lb 8.2 oz)    Height: 5' 10.5" (1.791 m)      Body mass index is 26.38 kg/m².  Physical Exam   Constitutional: He is oriented to person, place, and time. He appears well-developed and well-nourished. No distress.   HENT:   Head: Normocephalic and atraumatic.   Neck: Normal range of motion. Neck supple. No JVD present.   Pulmonary/Chest: Effort normal.   Neurological: He is alert and oriented to person, place, and time.   Skin: He is not diaphoretic.               Diagnoses and health risks identified today and associated recommendations/orders:    Willie was seen today for medicare awv.    Diagnoses and all orders for this visit:    Encounter for preventive health examination    Subclavian artery stenosis, left  Comments:  stable, continue to monitor    Stenosis of carotid artery, unspecified laterality  Comments:  stable, continue to monitor    Gilbert's syndrome  Comments:  stable, continue to monitor    Gastroesophageal reflux disease without esophagitis  Comments:  controlled, continue meds    Hyperlipidemia, mixed  Comments:  controlled, continue " meds    Hyperglycemia  Comments:  controlled, continue to monitor    Postherpetic neuralgia  Comments:  intermittant, continue to monitor        Provided Willie with a 5-10 year written screening schedule and personal prevention plan. Recommendations were developed using the USPSTF age appropriate recommendations. Education, counseling, and referrals were provided as needed. After Visit Summary printed and given to patient which includes a list of additional screenings\tests needed.    No Follow-up on file.    SAGAR Mariee

## 2018-11-30 NOTE — PATIENT INSTRUCTIONS
Counseling and Referral of Other Preventative  (Italic type indicates deductible and co-insurance are waived)    Patient Name: Willie Liao  Today's Date: 11/30/2018    Health Maintenance       Date Due Completion Date    Lipid Panel 09/25/2019 9/25/2018    TETANUS VACCINE 09/07/2021 9/7/2011        No orders of the defined types were placed in this encounter.    The following information is provided to all patients.  This information is to help you find resources for any of the problems found today that may be affecting your health:                Living healthy guide: www.[facility].[place].[facility]      Understanding Diabetes: www.diabetes.org      Eating healthy: www.cdc.gov/healthyweight      CDC home safety checklist: www.cdc.gov/steadi/patient.html      Agency on Aging: www.goea.[place].[facility]      Alcoholics anonymous (AA): www.aa.org      Physical Activity: www.jannette.nih.gov/as9yphi      Tobacco use: www.quitwithusla.org

## 2018-11-30 NOTE — Clinical Note
Primary Care Providers:Anabella Mcintyre MD, MD (General)Your patient was seen today for a HRA visit. Gap(s) in care (HEDIS gaps) have been identified during this visit that require additional testing and possible follow up.No orders of the defined types were placed in this encounter.These orders were placed using [facility] approved protocol and any results will be forwarded to your office for appropriate follow up. I have included a copy of my visit note; please review the note and feel free to contact me with any questions. Thank you for allowing me to participate in the care of your patients.SAGAR Mariee

## 2018-12-11 ENCOUNTER — TELEPHONE (OUTPATIENT)
Dept: FAMILY MEDICINE | Facility: CLINIC | Age: 78
End: 2018-12-11

## 2018-12-11 NOTE — TELEPHONE ENCOUNTER
Called pt regarding below message. Discussed on separate encounter    ----- Message from Eliza Jamil sent at 12/11/2018  1:45 PM CST -----  Type:  Patient Returning Call    Who Called:  Wife-Genie  Who Left Message for Patient: Marilyn  Does the patient know what this is regarding?:  NA   Best Call Back Number:  [phone]  Additional Information:

## 2018-12-11 NOTE — TELEPHONE ENCOUNTER
Called pt regarding below message. Informed pt of below results and recommendations. Pt is requesting copy of labs mailed to him. Address confirmed . Pt verbalized understanding with no further questions;.

## 2018-12-11 NOTE — TELEPHONE ENCOUNTER
----- Message from SAGAR Ayon sent at 12/10/2018  3:43 PM CST -----  I have reviewed all lab results which are stable.  Continue all medication. Please inform the patient.

## 2019-01-21 RX ORDER — ATORVASTATIN CALCIUM 20 MG/1
20 TABLET, FILM COATED ORAL DAILY
Qty: 90 TABLET | Refills: 3 | Status: SHIPPED | OUTPATIENT
Start: 2019-01-21 | End: 2021-09-13 | Stop reason: SDUPTHER

## 2019-01-29 ENCOUNTER — TELEPHONE (OUTPATIENT)
Dept: FAMILY MEDICINE | Facility: CLINIC | Age: 79
End: 2019-01-29

## 2019-01-29 DIAGNOSIS — H04.203 WATERY EYES: Primary | ICD-10-CM

## 2019-02-08 ENCOUNTER — OFFICE VISIT (OUTPATIENT)
Dept: OPHTHALMOLOGY | Facility: CLINIC | Age: 79
End: 2019-02-08
Payer: MEDICARE

## 2019-02-08 DIAGNOSIS — H02.132 SENILE ECTROPION OF BOTH LOWER EYELIDS: Primary | ICD-10-CM

## 2019-02-08 DIAGNOSIS — H02.055 TRICHIASIS WITHOUT ENTROPION LEFT LOWER EYELID: ICD-10-CM

## 2019-02-08 DIAGNOSIS — H52.7 REFRACTIVE ERROR: ICD-10-CM

## 2019-02-08 DIAGNOSIS — H02.135 SENILE ECTROPION OF BOTH LOWER EYELIDS: Primary | ICD-10-CM

## 2019-02-08 DIAGNOSIS — H40.013 OPEN ANGLE WITH BORDERLINE FINDINGS OF BOTH EYES: ICD-10-CM

## 2019-02-08 DIAGNOSIS — H25.13 NUCLEAR SCLEROSIS, BILATERAL: ICD-10-CM

## 2019-02-08 PROCEDURE — 99499 RISK ADDL DX/OHS AUDIT: ICD-10-PCS | Mod: S$GLB,,, | Performed by: OPHTHALMOLOGY

## 2019-02-08 PROCEDURE — 92133 CPTRZD OPH DX IMG PST SGM ON: CPT | Mod: S$GLB,,, | Performed by: OPHTHALMOLOGY

## 2019-02-08 PROCEDURE — 99999 PR PBB SHADOW E&M-EST. PATIENT-LVL III: ICD-10-PCS | Mod: PBBFAC,,, | Performed by: OPHTHALMOLOGY

## 2019-02-08 PROCEDURE — 92004 PR EYE EXAM, NEW PATIENT,COMPREHESV: ICD-10-PCS | Mod: S$GLB,,, | Performed by: OPHTHALMOLOGY

## 2019-02-08 PROCEDURE — 92004 COMPRE OPH EXAM NEW PT 1/>: CPT | Mod: S$GLB,,, | Performed by: OPHTHALMOLOGY

## 2019-02-08 PROCEDURE — 99499 UNLISTED E&M SERVICE: CPT | Mod: S$GLB,,, | Performed by: OPHTHALMOLOGY

## 2019-02-08 PROCEDURE — 92133 POSTERIOR SEGMENT OCT OPTIC NERVE(OCULAR COHERENCE TOMOGRAPHY) - OU - BOTH EYES: ICD-10-PCS | Mod: S$GLB,,, | Performed by: OPHTHALMOLOGY

## 2019-02-08 PROCEDURE — 99999 PR PBB SHADOW E&M-EST. PATIENT-LVL III: CPT | Mod: PBBFAC,,, | Performed by: OPHTHALMOLOGY

## 2019-02-08 NOTE — PROGRESS NOTES
Assessment /Plan     For exam results, see Encounter Report.    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 Handley but still symptomatic. Punctae still everting. [place] to Dr. Gonzalez.  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 Discussed option of removal, however re-growth may become more symptomatic than what he is experiencing now. Observe.    Nuclear sclerosis, bilateral - approaching visual significance. Denies blurred vision.    Open angle with borderline findings of both eyes - IOP high normal. Optic nerve with some possible early superior thinning, vs off-center cup. Baseline OCT nerve today 2/8/19 WNL. Repeat if changes noted on clinical exam.   -     Posterior Segment OCT Optic Nerve- Both eyes; Standing  Addendum: records from Dr. Handley reviewed - C:D previously documented as 0.4 OU at his office. Narrow angles also noted but I did not see gonioscopy results.     Refractive error - Declines MRx          Follow-up in about 1 year (around 2/8/2020).    Records reviewed from Dr. Handley  2/6/18  Followed as narrow angle glaucoma suspect  Mother with glaucoma  Vasc 20/30+2 OD, 20/25+2 OS  Schirmer's 4mm/6mm  Ta 18/16  Tp 21/19  BAT 20/200 OU  2-3+ NSC OU  C:D 0.4 OU  PVD OU  Hx BLL ectropion repair  Pinguecula OD  Pterygium OS     6/6/18  Schirmer's 7 OU  VA 20/25- OU  Low TBUT  Artificial tears prn

## 2019-02-27 ENCOUNTER — TELEPHONE (OUTPATIENT)
Dept: FAMILY MEDICINE | Facility: CLINIC | Age: 79
End: 2019-02-27

## 2019-02-27 NOTE — TELEPHONE ENCOUNTER
Left a message for pt to return call to clinic so an appointment can be scheduled for clearance      ----- Message from Marianna Nieto sent at 2/27/2019  1:11 PM CST -----  Contact: SELF [phone] OR CELL [phone]  Patient stopped by  to drop off paperwork for eye surgery on tear ducts.  Please complete and call patient when done to .  Patient states he needs surgery ASAP.  Paperwork put in PCP mailbox.

## 2019-02-27 NOTE — TELEPHONE ENCOUNTER
appt made for 3/18 for eye surgery clearance  Iv sedation only          ----- Message from Eliza Jamil sent at 2/27/2019  1:50 PM CST -----  Type:  Sooner Apoointment Request    Caller is requesting a sooner appointment.  Caller declined first available appointment listed below.  Caller will not accept being placed on the waitlist and is requesting a message be sent to doctor.    Name of Caller: self   When is the first available appointment?  No future appointments   Symptoms: NA  Best Call Back Number:  [phone] Additional Information:  Patient need to schedule clearance for tear duct surgery.

## 2019-03-18 ENCOUNTER — TELEPHONE (OUTPATIENT)
Dept: FAMILY MEDICINE | Facility: CLINIC | Age: 79
End: 2019-03-18

## 2019-03-18 ENCOUNTER — LAB VISIT (OUTPATIENT)
Dept: LAB | Facility: HOSPITAL | Age: 79
End: 2019-03-18
Attending: PHYSICIAN ASSISTANT
Payer: MEDICARE

## 2019-03-18 ENCOUNTER — DOCUMENTATION ONLY (OUTPATIENT)
Dept: FAMILY MEDICINE | Facility: CLINIC | Age: 79
End: 2019-03-18

## 2019-03-18 ENCOUNTER — OFFICE VISIT (OUTPATIENT)
Dept: FAMILY MEDICINE | Facility: CLINIC | Age: 79
End: 2019-03-18
Payer: MEDICARE

## 2019-03-18 VITALS
DIASTOLIC BLOOD PRESSURE: 78 MMHG | SYSTOLIC BLOOD PRESSURE: 131 MMHG | TEMPERATURE: 98 F | BODY MASS INDEX: 26.23 KG/M2 | HEART RATE: 77 BPM | HEIGHT: 71 IN | WEIGHT: 187.38 LBS

## 2019-03-18 DIAGNOSIS — E78.5 HYPERLIPIDEMIA, UNSPECIFIED HYPERLIPIDEMIA TYPE: ICD-10-CM

## 2019-03-18 DIAGNOSIS — K21.9 GASTROESOPHAGEAL REFLUX DISEASE WITHOUT ESOPHAGITIS: ICD-10-CM

## 2019-03-18 DIAGNOSIS — Z86.39 HISTORY OF HYPERGLYCEMIA: ICD-10-CM

## 2019-03-18 DIAGNOSIS — I77.1 SUBCLAVIAN ARTERY STENOSIS, LEFT: ICD-10-CM

## 2019-03-18 DIAGNOSIS — I65.29 STENOSIS OF CAROTID ARTERY, UNSPECIFIED LATERALITY: ICD-10-CM

## 2019-03-18 DIAGNOSIS — Z01.818 PRE-OP EVALUATION: Primary | ICD-10-CM

## 2019-03-18 DIAGNOSIS — Z01.818 PRE-OP EVALUATION: ICD-10-CM

## 2019-03-18 LAB
ALBUMIN SERPL BCP-MCNC: 3.8 G/DL
ALP SERPL-CCNC: 86 U/L
ALT SERPL W/O P-5'-P-CCNC: 22 U/L
ANION GAP SERPL CALC-SCNC: 6 MMOL/L
AST SERPL-CCNC: 18 U/L
BASOPHILS # BLD AUTO: 0.03 K/UL
BASOPHILS NFR BLD: 0.4 %
BILIRUB SERPL-MCNC: 1.6 MG/DL
BUN SERPL-MCNC: 13 MG/DL
CALCIUM SERPL-MCNC: 9.3 MG/DL
CHLORIDE SERPL-SCNC: 107 MMOL/L
CHOLEST SERPL-MCNC: 134 MG/DL
CHOLEST/HDLC SERPL: 3.5 {RATIO}
CO2 SERPL-SCNC: 29 MMOL/L
CREAT SERPL-MCNC: 1 MG/DL
DIFFERENTIAL METHOD: ABNORMAL
EOSINOPHIL # BLD AUTO: 0.2 K/UL
EOSINOPHIL NFR BLD: 1.9 %
ERYTHROCYTE [DISTWIDTH] IN BLOOD BY AUTOMATED COUNT: 12.9 %
EST. GFR  (AFRICAN AMERICAN): >60 ML/MIN/1.73 M^2
EST. GFR  (NON AFRICAN AMERICAN): >60 ML/MIN/1.73 M^2
ESTIMATED AVG GLUCOSE: 117 MG/DL
GLUCOSE SERPL-MCNC: 111 MG/DL
HBA1C MFR BLD HPLC: 5.7 %
HCT VFR BLD AUTO: 47.5 %
HDLC SERPL-MCNC: 38 MG/DL
HDLC SERPL: 28.4 %
HGB BLD-MCNC: 16 G/DL
IMM GRANULOCYTES # BLD AUTO: 0.03 K/UL
IMM GRANULOCYTES NFR BLD AUTO: 0.4 %
LDLC SERPL CALC-MCNC: 78.6 MG/DL
LYMPHOCYTES # BLD AUTO: 2.4 K/UL
LYMPHOCYTES NFR BLD: 30.3 %
MCH RBC QN AUTO: 31.4 PG
MCHC RBC AUTO-ENTMCNC: 33.7 G/DL
MCV RBC AUTO: 93 FL
MONOCYTES # BLD AUTO: 0.6 K/UL
MONOCYTES NFR BLD: 7 %
NEUTROPHILS # BLD AUTO: 4.7 K/UL
NEUTROPHILS NFR BLD: 60 %
NONHDLC SERPL-MCNC: 96 MG/DL
NRBC BLD-RTO: 0 /100 WBC
PLATELET # BLD AUTO: 242 K/UL
PMV BLD AUTO: 12 FL
POTASSIUM SERPL-SCNC: 4.4 MMOL/L
PROT SERPL-MCNC: 6.9 G/DL
RBC # BLD AUTO: 5.09 M/UL
SODIUM SERPL-SCNC: 142 MMOL/L
TRIGL SERPL-MCNC: 87 MG/DL
WBC # BLD AUTO: 7.83 K/UL

## 2019-03-18 PROCEDURE — 99214 PR OFFICE/OUTPT VISIT, EST, LEVL IV, 30-39 MIN: ICD-10-PCS | Mod: S$GLB,,, | Performed by: PHYSICIAN ASSISTANT

## 2019-03-18 PROCEDURE — 1101F PT FALLS ASSESS-DOCD LE1/YR: CPT | Mod: CPTII,S$GLB,, | Performed by: PHYSICIAN ASSISTANT

## 2019-03-18 PROCEDURE — 1101F PR PT FALLS ASSESS DOC 0-1 FALLS W/OUT INJ PAST YR: ICD-10-PCS | Mod: CPTII,S$GLB,, | Performed by: PHYSICIAN ASSISTANT

## 2019-03-18 PROCEDURE — 93000 ELECTROCARDIOGRAM COMPLETE: CPT | Mod: S$GLB,,, | Performed by: INTERNAL MEDICINE

## 2019-03-18 PROCEDURE — 99214 OFFICE O/P EST MOD 30 MIN: CPT | Mod: S$GLB,,, | Performed by: PHYSICIAN ASSISTANT

## 2019-03-18 PROCEDURE — 99499 RISK ADDL DX/OHS AUDIT: ICD-10-PCS | Mod: S$GLB,,, | Performed by: PHYSICIAN ASSISTANT

## 2019-03-18 PROCEDURE — 99999 PR PBB SHADOW E&M-EST. PATIENT-LVL III: ICD-10-PCS | Mod: PBBFAC,,, | Performed by: PHYSICIAN ASSISTANT

## 2019-03-18 PROCEDURE — 99999 PR PBB SHADOW E&M-EST. PATIENT-LVL III: CPT | Mod: PBBFAC,,, | Performed by: PHYSICIAN ASSISTANT

## 2019-03-18 PROCEDURE — 93000 EKG 12-LEAD: ICD-10-PCS | Mod: S$GLB,,, | Performed by: INTERNAL MEDICINE

## 2019-03-18 PROCEDURE — 36415 COLL VENOUS BLD VENIPUNCTURE: CPT | Mod: PO

## 2019-03-18 PROCEDURE — 80061 LIPID PANEL: CPT

## 2019-03-18 PROCEDURE — 85025 COMPLETE CBC W/AUTO DIFF WBC: CPT

## 2019-03-18 PROCEDURE — 83036 HEMOGLOBIN GLYCOSYLATED A1C: CPT

## 2019-03-18 PROCEDURE — 99499 UNLISTED E&M SERVICE: CPT | Mod: S$GLB,,, | Performed by: PHYSICIAN ASSISTANT

## 2019-03-18 PROCEDURE — 80053 COMPREHEN METABOLIC PANEL: CPT

## 2019-03-18 NOTE — TELEPHONE ENCOUNTER
----- Message from Nadine Hdez sent at 3/18/2019  2:26 PM CDT -----  Contact: pt  Calling about a missed call and not sure why but please advise. [phone] (home)

## 2019-03-18 NOTE — Clinical Note
Please call patient and let him know he does NOT need to keep 6 month f/u with Dr. Mcintyre next week. We can cancel this and make sure he gets scheduled to est care with Dr. Zelaya or Dr. Rodriguez in 6 months.

## 2019-03-18 NOTE — TELEPHONE ENCOUNTER
Returned pt call there was documentation of a missed call. Pt stated if it was important they will call him back.          ----- Message from Nadine Hdez sent at 3/18/2019  2:26 PM CDT -----  Contact: pt  Calling about a missed call and not sure why but please advise. [phone] (home)

## 2019-03-18 NOTE — PROGRESS NOTES
Pre-Visit Chart Review  For Appointment Scheduled on 03/18/19    There are no preventive care reminders to display for this patient.

## 2019-03-18 NOTE — TELEPHONE ENCOUNTER
----- Message from Susan Linares sent at 3/18/2019 12:13 PM CDT -----  Contact: self [phone]  Call placed to pod. Patient returned your call, please call back.  Thank you!

## 2019-03-18 NOTE — PROGRESS NOTES
Subjective:       Patient ID: Willie Liao is a 78 y.o. male.    Chief Complaint: Pre-op Exam    HPI     This is a 78 year old  male  who presents to the clinic for lower lid eye surgery clearance. States he is having this surgery secondary to lower lid ectropion which causes increased tearing throughout the day.They are operating on his right eye on April 2nd and left eye April 31.   He is currently being treated for Carotid arterial disease with ASA 81 daily and yearly US with cardiology. Last US 6/2018 shows no detrimental change from previous. Homogenous calcified plaque at carotid bulb and proximal internal carotid arteries bilaterally. No hemodynamically significant stenosis. Suggested stenosis of less than 50%.   HLD is treated with Lipitor 20 mg with no side effects.   GERD treated with protonix 40 mg.     Review of Systems   Constitutional: Negative for activity change, appetite change, chills, fatigue and fever.   HENT: Negative for congestion, ear pain, postnasal drip, rhinorrhea and sinus pressure.    Eyes:        Watery eyes   Respiratory: Negative for cough, shortness of breath and wheezing.    Cardiovascular: Negative for chest pain and palpitations.   Gastrointestinal: Negative for abdominal pain, constipation, diarrhea, nausea and vomiting.   Genitourinary: Negative for frequency, hematuria and urgency.   Musculoskeletal: Negative for back pain and gait problem.   Skin: Negative for rash.   Neurological: Negative for syncope, weakness and headaches.   Psychiatric/Behavioral: Negative for agitation and confusion.       Objective:      Physical Exam   Constitutional: He is oriented to person, place, and time. Vital signs are normal. He appears well-developed and well-nourished. No distress.   HENT:   Head: Normocephalic and atraumatic.   Right Ear: Tympanic membrane, external ear and ear canal normal.   Left Ear: Tympanic membrane, external ear and ear canal normal.   Nose: Nose normal.    Mouth/Throat: Oropharynx is clear and moist. No oropharyngeal exudate, posterior oropharyngeal edema or posterior oropharyngeal erythema.   Eyes: EOM are normal. Pupils are equal, round, and reactive to light. Right conjunctiva is injected. Left conjunctiva is injected.   Ectropion lower lids bilaterally   Neck: Normal range of motion. Neck supple.   Cardiovascular: Normal rate, regular rhythm, S1 normal, S2 normal and normal heart sounds. Exam reveals no gallop.   No murmur heard.  Pulses:       Radial pulses are 2+ on the right side, and 2+ on the left side.   Pulmonary/Chest: Effort normal and breath sounds normal. No respiratory distress. He has no wheezes. He has no rhonchi.   Abdominal: Soft. Bowel sounds are normal. He exhibits no distension. There is no tenderness. There is no guarding.   Lymphadenopathy:     He has no cervical adenopathy.   Neurological: He is alert and oriented to person, place, and time.   Skin: Skin is warm and dry. He is not diaphoretic.   Appropriate skin turgor   Psychiatric: He has a normal mood and affect. His speech is normal and behavior is normal. Judgment and thought content normal. Cognition and memory are normal.         Willie was seen today for pre-op exam.    Diagnoses and all orders for this visit:    Pre-op evaluation  Patient is cleared for bilateral lower lid reconstruction.  Medical clearance paperwork filled out and faxed to Dr. Winslow's office.  -     CBC auto differential; Future  -     Comprehensive metabolic panel; Future  -     Lipid panel; Future  -     IN OFFICE EKG 12-LEAD (to [place]): NSR/WNL    If for some reason lab work contraindicates surgery, we will let patient and surgeon know ASAP, however, I do not anticipate this.    History of hyperglycemia  -     Hemoglobin A1c; Future    Hyperlipidemia, unspecified hyperlipidemia type  -     Comprehensive metabolic panel; Future  -     Lipid panel; Future    Stenosis of carotid artery, unspecified laterality   -  Stable, followed by cardiology with yearly ultrasounds.    Gastroesophageal reflux disease without esophagitis   - Stable, continue protonix    Subclavian artery stenosis, left   - Stable, followed by cardiology.

## 2019-07-22 ENCOUNTER — TELEPHONE (OUTPATIENT)
Dept: OPHTHALMOLOGY | Facility: CLINIC | Age: 79
End: 2019-07-22

## 2019-07-22 NOTE — TELEPHONE ENCOUNTER
----- Message from Ramsey Cartagena sent at 7/22/2019  9:06 AM CDT -----  Contact: Genie Liao (Spouse)  Type: Needs Medical Advice    Who Called:  Genie Liao (Spouse)  Best Call Back Number: [phone]  Additional Information: Spouse is requesting to know if a comprehensive eye exam was completed during new patient appointment on 2/8/19 so they can schedule an eye exam if not. Please advise

## 2019-08-14 DIAGNOSIS — I65.23 BILATERAL CAROTID ARTERY STENOSIS: Primary | ICD-10-CM

## 2019-08-16 ENCOUNTER — OFFICE VISIT (OUTPATIENT)
Dept: DERMATOLOGY | Facility: CLINIC | Age: 79
End: 2019-08-16
Payer: MEDICARE

## 2019-08-16 VITALS — HEIGHT: 71 IN | WEIGHT: 187.38 LBS | BODY MASS INDEX: 26.23 KG/M2

## 2019-08-16 DIAGNOSIS — B07.8 COMMON WART: ICD-10-CM

## 2019-08-16 DIAGNOSIS — L81.4 SOLAR LENTIGO: ICD-10-CM

## 2019-08-16 DIAGNOSIS — L57.0 ACTINIC KERATOSES: ICD-10-CM

## 2019-08-16 DIAGNOSIS — D48.5 NEOPLASM OF UNCERTAIN BEHAVIOR OF SKIN: Primary | ICD-10-CM

## 2019-08-16 DIAGNOSIS — L82.1 SEBORRHEIC KERATOSES: ICD-10-CM

## 2019-08-16 PROCEDURE — 88305 TISSUE EXAM BY PATHOLOGIST: CPT | Performed by: PATHOLOGY

## 2019-08-16 PROCEDURE — 17110 DESTRUCTION B9 LES UP TO 14: CPT | Mod: S$GLB,,, | Performed by: DERMATOLOGY

## 2019-08-16 PROCEDURE — 99999 PR PBB SHADOW E&M-EST. PATIENT-LVL III: ICD-10-PCS | Mod: PBBFAC,,, | Performed by: DERMATOLOGY

## 2019-08-16 PROCEDURE — 11102 TANGNTL BX SKIN SINGLE LES: CPT | Mod: 59,S$GLB,, | Performed by: DERMATOLOGY

## 2019-08-16 PROCEDURE — 17003 DESTRUCT PREMALG LES 2-14: CPT | Mod: 59,S$GLB,, | Performed by: DERMATOLOGY

## 2019-08-16 PROCEDURE — 99999 PR PBB SHADOW E&M-EST. PATIENT-LVL III: CPT | Mod: PBBFAC,,, | Performed by: DERMATOLOGY

## 2019-08-16 PROCEDURE — 11102 PR TANGENTIAL BIOPSY, SKIN, SINGLE LESION: ICD-10-PCS | Mod: 59,S$GLB,, | Performed by: DERMATOLOGY

## 2019-08-16 PROCEDURE — 99202 OFFICE O/P NEW SF 15 MIN: CPT | Mod: 25,S$GLB,, | Performed by: DERMATOLOGY

## 2019-08-16 PROCEDURE — 1101F PR PT FALLS ASSESS DOC 0-1 FALLS W/OUT INJ PAST YR: ICD-10-PCS | Mod: CPTII,S$GLB,, | Performed by: DERMATOLOGY

## 2019-08-16 PROCEDURE — 99202 PR OFFICE/OUTPT VISIT, NEW, LEVL II, 15-29 MIN: ICD-10-PCS | Mod: 25,S$GLB,, | Performed by: DERMATOLOGY

## 2019-08-16 PROCEDURE — 11103 TANGNTL BX SKIN EA SEP/ADDL: CPT | Mod: S$GLB,,, | Performed by: DERMATOLOGY

## 2019-08-16 PROCEDURE — 17000 PR DESTRUCTION(LASER SURGERY,CRYOSURGERY,CHEMOSURGERY),PREMALIGNANT LESIONS,FIRST LESION: ICD-10-PCS | Mod: 59,S$GLB,, | Performed by: DERMATOLOGY

## 2019-08-16 PROCEDURE — 17110 PR DESTRUCTION BENIGN LESIONS UP TO 14: ICD-10-PCS | Mod: S$GLB,,, | Performed by: DERMATOLOGY

## 2019-08-16 PROCEDURE — 1101F PT FALLS ASSESS-DOCD LE1/YR: CPT | Mod: CPTII,S$GLB,, | Performed by: DERMATOLOGY

## 2019-08-16 PROCEDURE — 88305 TISSUE SPECIMEN TO PATHOLOGY, DERMATOLOGY: ICD-10-PCS | Mod: 26,,, | Performed by: PATHOLOGY

## 2019-08-16 PROCEDURE — 17003 DESTRUCTION, PREMALIGNANT LESIONS; SECOND THROUGH 14 LESIONS: ICD-10-PCS | Mod: 59,S$GLB,, | Performed by: DERMATOLOGY

## 2019-08-16 PROCEDURE — 17000 DESTRUCT PREMALG LESION: CPT | Mod: 59,S$GLB,, | Performed by: DERMATOLOGY

## 2019-08-16 PROCEDURE — 11103 PR TANGENTIAL BIOPSY, SKIN, EA ADDTL LESION: ICD-10-PCS | Mod: S$GLB,,, | Performed by: DERMATOLOGY

## 2019-08-16 NOTE — PATIENT INSTRUCTIONS
Shave Biopsy Wound Care    Your doctor has performed a shave biopsy today.  A band aid and vaseline ointment has been placed over the site.  This should remain in place for 24 hours.  It is recommended that you keep the area dry for the first 24 hours.  After 24 hours, you may remove the band aid and wash the area with warm soap and water and apply Vaseline jelly.  Many patients prefer to use Neosporin or Bacitracin ointment.  This is acceptable; however, know that you can develop an allergy to this medication even if you have used it safely for years.  It is important to keep the area moist.  Letting it dry out and get air slows healing time, and will worsen the scar.  Band aid is optional after first 24 hours.      If you notice increasing redness, tenderness, pain, or yellow drainage at the biopsy site, please notify your doctor.  These are signs of an infection.    If your biopsy site is bleeding, apply firm pressure for 15 minutes straight.  Repeat for another 15 minutes, if it is still bleeding.   If the surgical site continues to bleed, then please contact your doctor.       [facility] DERMATOLOGY  [address] [place] [address] Drive, [address]  [facility] [address]  Dept: [phone]    CRYOSURGERY      Your doctor has used a method called cryosurgery to treat your skin condition. Cryosurgery refers to the use of very cold substances to treat a variety of skin conditions such as warts, pre-skin cancers, molluscum contagiosum, sun spots, and several benign growths. The substance we use in cryosurgery is liquid nitrogen and is so cold (-195 degrees Celsius) that is burns when administered.     Following treatment in the office, the skin may immediately burn and become red. You may find the area around the lesion is affected as well. It is sometimes necessary to treat not only the lesion, but a small area of the surrounding normal skin to achieve a good response.     A blister, and even a blood  filled blister, may form after treatment.   This is a normal response. If the blister is painful, it is acceptable to sterilize a needle and with rubbing alcohol and gently pop the blister. It is important that you gently wash the area with soap and warm water as the blister fluid may contain wart virus if a wart was treated. Do no remove the roof of the blister.     The area treated can take anywhere from 1-3 weeks to heal. Healing time depends on the kind skin lesion treated, the location, and how aggressively the lesion was treated. It is recommended that the areas treated are covered with Vaseline or bacitracin ointment and a band-aid. If a band-aid is not practical, just ointment applied several times per day will do. Keeping these areas moist will speed the healing time.    Treatment with liquid nitrogen can leave a scar. In dark skin, it may be a light or dark scar, in light skin it may be a white or pink scar. These will generally fade with time, but may never go away completely.     If you have any concerns after your treatment, please feel free to call the office.         [facility] - DERMATOLOGY  [address] [place] [address] Drive, [address]  [facility] [address]  Dept: [phone]

## 2019-08-16 NOTE — PROGRESS NOTES
Subjective:       Patient ID:  Willie Liao is a 79 y.o. 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  Duration: couple months.  Signs / symptoms: rough (discolored)  Severity: mild  Timing: constant  Aggravated by: nothing        Review of Systems   Constitutional: Negative for fever and chills.   Skin: Positive for activity-related sunscreen use (inconsistently) and wears hat. Negative for itching, rash and daily sunscreen use.   Hematologic/Lymphatic: Does not bruise/bleed easily.        Objective:    Physical Exam   Constitutional: He appears well-developed and well-nourished. No distress.   Neurological: He is alert and oriented to person, place, and time. He is not disoriented.   Psychiatric: He has a normal mood and affect.   Skin:   Areas Examined (abnormalities noted in diagram):   Scalp / Hair Palpated and Inspected  Head / Face Inspection Performed  Neck Inspection Performed  Chest / Axilla Inspection Performed  Abdomen Inspection Performed  Back Inspection Performed  RUE Inspected  LUE Inspection Performed  RLE Inspected  LLE Inspection Performed  Nails and Digits Inspection Performed                   Diagram Legend     Erythematous scaling macule/papule c/w actinic keratosis       Vascular papule c/w angioma      Pigmented verrucoid papule/plaque c/w seborrheic keratosis      Yellow umbilicated papule c/w sebaceous hyperplasia      Irregularly shaped tan macule c/w lentigo     1-2 mm smooth white papules consistent with Milia      Movable subcutaneous cyst with punctum c/w epidermal inclusion cyst      Subcutaneous movable cyst c/w pilar cyst      Firm  pink to brown papule c/w dermatofibroma      Pedunculated fleshy papule(s) c/w skin tag(s)      Evenly pigmented macule c/w junctional nevus     Mildly variegated pigmented, slightly irregular-bordered macule c/w mildly atypical nevus      Flesh colored to evenly pigmented papule c/w intradermal nevus       Pink pearly papule/plaque c/w basal cell carcinoma      Erythematous hyperkeratotic cursted plaque c/w SCC      Surgical scar with no sign of skin cancer recurrence      Open and closed comedones      Inflammatory papules and pustules      Verrucoid papule consistent consistent with wart     Erythematous eczematous patches and plaques     Dystrophic onycholytic nail with subungual debris c/w onychomycosis     Umbilicated papule    Erythematous-base heme-crusted tan verrucoid plaque consistent with inflamed seborrheic keratosis     Erythematous Silvery Scaling Plaque c/w Psoriasis     See annotation              Assessment / Plan:      Pathology Orders:     Normal Orders This Visit    Tissue Specimen To Pathology, Dermatology     Questions:    Directional Terms:  Other(comment)    Clinical Information:  1/2- R helix rim, hyperkeratotic papule, r/o SCC  2/2- R dorsal wrist, hyperkeratotic papule, HAK vs SCC    Specific Site:  1/2- R helix rim, 2/2- R dorsal wrist        Neoplasm of uncertain behavior of skin  -     Tissue Specimen To Pathology, Dermatology  Shave biopsy procedure note:x2    Shave biopsy performed after verbal consent including risk of infection, scar, recurrence, need for additional treatment of site. Area prepped with alcohol, anesthetized with approximately 1.0cc of 1% lidocaine with epinephrine. Lesional tissue shaved with razor blade. Hemostasis achieved with application of aluminum chloride followed by hyfrecation. No complications. Dressing applied. Wound care explained.    Actinic keratoses  Cryosurgery Procedure Note    Verbal consent from the patient is obtained and the patient is aware of  the precancerous quality and need for treatment of these lesions. Liquid nitrogen cryosurgery is applied to the 4 actinic keratoses, as detailed in the physical exam, to produce a freeze injury. The patient is aware that blisters may form and is instructed on wound care with gentle cleansing and use of vaseline ointment to keep moist until healed. The patient is supplied a handout on cryosurgery and is instructed to call if lesions do not completely resolve.    Seborrheic keratoses  These are benign inherited growths without a malignant potential. Reassurance given to patient. No treatment is necessary.     Solar lentigo  This is a benign hyperpigmented sun induced lesion. Daily sun protection will reduce the number of new lesions. Treatment of these benign lesions are considered cosmetic.    Common wart  Cryosurgery procedure note:    Verbal consent from the patient is obtained. Liquid nitrogen cryosurgery is applied to 3 lesions to produce a freeze injury. The patient is aware that blisters may form and is instructed on wound care with gentle cleansing and use of vaseline ointment to keep moist until healed. The patient is supplied a handout on cryosurgery and is instructed to call if lesions do not completely resolve.    Patient instructed in importance in daily sun protection of at least spf 30. Mineral sunscreen ingredients preferred (Zinc +/- Titanium).   Recommend Elta MD for daily use on face and neck.  Patient encouraged to wear hat for all outdoor exposure.   Also discussed sun avoidance and use of protective clothing.         Follow up in about 1 year (around 8/16/2020), or if symptoms worsen or fail to improve.

## 2019-08-21 ENCOUNTER — HOSPITAL ENCOUNTER (OUTPATIENT)
Dept: RADIOLOGY | Facility: CLINIC | Age: 79
Discharge: HOME | End: 2019-08-21
Attending: THORACIC SURGERY (CARDIOTHORACIC VASCULAR SURGERY)
Payer: MEDICARE

## 2019-08-21 DIAGNOSIS — I65.23 BILATERAL CAROTID ARTERY STENOSIS: ICD-10-CM

## 2019-08-21 PROCEDURE — 93880 EXTRACRANIAL BILAT STUDY: CPT | Mod: 26,,, | Performed by: RADIOLOGY

## 2019-08-21 PROCEDURE — 93880 EXTRACRANIAL BILAT STUDY: CPT | Mod: TC,PO

## 2019-08-21 PROCEDURE — 93880 US CAROTID BILATERAL: ICD-10-PCS | Mod: 26,,, | Performed by: RADIOLOGY

## 2019-08-27 ENCOUNTER — TELEPHONE (OUTPATIENT)
Dept: OPHTHALMOLOGY | Facility: CLINIC | Age: 79
End: 2019-08-27

## 2019-08-27 NOTE — TELEPHONE ENCOUNTER
Spoke to wife gave recommendations for Gonzalez or Lorri whichever he prefers. Will call back with decision.

## 2019-08-27 NOTE — TELEPHONE ENCOUNTER
----- Message from Chery Raya sent at 8/27/2019 11:41 AM CDT -----  Type:  Patient Requesting Referral    Who Called:  Ming Liao  Does the patient already have the specialty appointment scheduled?:  no  If yes, what is the date of that appointment?:  na  Referral to What Specialty:  Ophthalmology Plastic Reconstructive Surgery  Reason for Referral:  Problems with tear ducts  Does the patient want the referral with a specific physician?:  Dr Abdi Haines  Is the specialist an [facility] or [facility]?:  [facility]  Patient Requesting a Call Back?:  yes  Best Call Back Number:  [phone]  Additional Information:   Phone #[phone]  /   Fax #[phone]

## 2019-10-31 ENCOUNTER — OFFICE VISIT (OUTPATIENT)
Dept: FAMILY MEDICINE | Facility: CLINIC | Age: 79
End: 2019-10-31
Payer: MEDICARE

## 2019-10-31 VITALS
HEIGHT: 71 IN | WEIGHT: 184.31 LBS | DIASTOLIC BLOOD PRESSURE: 70 MMHG | BODY MASS INDEX: 25.8 KG/M2 | OXYGEN SATURATION: 95 % | TEMPERATURE: 98 F | SYSTOLIC BLOOD PRESSURE: 120 MMHG | HEART RATE: 73 BPM

## 2019-10-31 DIAGNOSIS — R73.03 PREDIABETES: Primary | ICD-10-CM

## 2019-10-31 DIAGNOSIS — K42.9 UMBILICAL HERNIA WITHOUT OBSTRUCTION AND WITHOUT GANGRENE: ICD-10-CM

## 2019-10-31 DIAGNOSIS — E78.5 HYPERLIPIDEMIA, UNSPECIFIED HYPERLIPIDEMIA TYPE: ICD-10-CM

## 2019-10-31 PROCEDURE — 99999 PR PBB SHADOW E&M-EST. PATIENT-LVL IV: ICD-10-PCS | Mod: PBBFAC,,, | Performed by: FAMILY MEDICINE

## 2019-10-31 PROCEDURE — 99214 OFFICE O/P EST MOD 30 MIN: CPT | Mod: S$GLB,,, | Performed by: FAMILY MEDICINE

## 2019-10-31 PROCEDURE — 99999 PR PBB SHADOW E&M-EST. PATIENT-LVL IV: CPT | Mod: PBBFAC,,, | Performed by: FAMILY MEDICINE

## 2019-10-31 PROCEDURE — 1101F PT FALLS ASSESS-DOCD LE1/YR: CPT | Mod: CPTII,S$GLB,, | Performed by: FAMILY MEDICINE

## 2019-10-31 PROCEDURE — 1101F PR PT FALLS ASSESS DOC 0-1 FALLS W/OUT INJ PAST YR: ICD-10-PCS | Mod: CPTII,S$GLB,, | Performed by: FAMILY MEDICINE

## 2019-10-31 PROCEDURE — 99214 PR OFFICE/OUTPT VISIT, EST, LEVL IV, 30-39 MIN: ICD-10-PCS | Mod: S$GLB,,, | Performed by: FAMILY MEDICINE

## 2019-10-31 NOTE — PROGRESS NOTES
Subjective:       Patient ID: Willie Liao is a 79 y.o. male.    Chief Complaint: Establish Care    HPI     Mr. Liao presents to clinic to establish care. Currently has no complaints.     Carotid artery disease: Sees Pedro Pablo Parry.       Past Medical History:   Diagnosis Date    Carotid stenosis     Gastritis     GERD (gastroesophageal reflux disease)     H. pylori infection     Hyperlipidemia     Shingles        Past Surgical History:   Procedure Laterality Date    arthroscope Left     knee arthroscopy, torn meniscus    COLONOSCOPY      Adrien       Family History   Problem Relation Age of Onset    Heart disease Father 65        MI    Hyperlipidemia Father     Stroke Father     COPD Mother     Glaucoma Mother     No Known Problems Sister     No Known Problems Brother     No Known Problems Daughter     Diabetes Unknown         uncles  and aunts     Cancer Paternal Uncle         prostate cancer    Melanoma Neg Hx        Social History     Tobacco Use    Smoking status: Former Smoker     Packs/day: 0.25     Last attempt to quit: 1997     Years since quittin.3    Smokeless tobacco: Never Used   Substance Use Topics    Alcohol use: No    Drug use: No       Social History     Substance and Sexual Activity   Sexual Activity Yes          Current Outpatient Medications:     atorvastatin (LIPITOR) 20 MG tablet, Take 1 tablet (20 mg total) by mouth once daily., Disp: 90 tablet, Rfl: 3    CALCIUM ORAL, Take 1 tablet by mouth once daily., Disp: , Rfl:     CYANOCOBALAMIN, VITAMIN B-12, (VITAMIN B-12 ORAL), Take 1 tablet by mouth once daily., Disp: , Rfl:     FLUZONE HIGH-DOSE , PF, 180 mcg/0.5 mL Syrg, ADM 0.5ML IM UTD, Disp: , Rfl: 0    glucosam/chond-msm1/C/angely/bor (GLUCOSAMINE-CHOND-MSM COMPLEX ORAL), Take 1 tablet by mouth once daily., Disp: , Rfl:     MULTIVITAMIN WITH MINERALS/LUT (MULTIVITAMIN 50 PLUS ORAL), Take 1 tablet by mouth once daily. , Disp: , Rfl:      "OMEGA-3 FATTY ACIDS/FISH OIL (OMEGA 3 FISH OIL ORAL), Take 1 capsule by mouth once daily., Disp: , Rfl:     pantoprazole (PROTONIX) 40 MG tablet, Take 40 mg by mouth once daily., Disp: , Rfl:     VITAMIN C 500 MG tablet, , Disp: , Rfl:     VITAMIN E ACETATE ORAL, Take 1 capsule by mouth once daily., Disp: , Rfl:      Review of patient's allergies indicates:   Allergen Reactions    No known drug allergies             Review of Systems   Constitutional: Negative for chills and fever.   HENT: Negative for congestion and sore throat.    Eyes: Negative for visual disturbance.   Respiratory: Negative for cough and shortness of breath.    Cardiovascular: Negative for chest pain.   Gastrointestinal: Negative for abdominal pain, constipation, diarrhea, nausea and vomiting.   Genitourinary: Negative for dysuria.   Musculoskeletal: Negative for joint swelling.   Skin: Negative for rash and wound.   Neurological: Negative for dizziness and headaches.   Hematological: Does not bruise/bleed easily.           Objective:          Vitals:    10/31/19 0849   BP: 120/70   Pulse: 73   Temp: 98.2 °F (36.8 °C)   SpO2: 95%   Weight: 83.6 kg (184 lb 4.9 oz)   Height: 5' 10.5" (1.791 m)       Physical Exam   Constitutional: He appears well-developed and well-nourished. He is cooperative. No distress.   HENT:   Head: Normocephalic and atraumatic.   Right Ear: Hearing and external ear normal.   Left Ear: Hearing and external ear normal.   Nose: Nose normal.   Eyes: Conjunctivae, EOM and lids are normal.   Cardiovascular: Normal rate, regular rhythm, normal heart sounds and normal pulses.   Pulmonary/Chest: Effort normal and breath sounds normal.   Abdominal: Soft. Normal appearance and bowel sounds are normal. There is no tenderness. A hernia is present. Hernia confirmed positive in the ventral area.   Musculoskeletal: Normal range of motion.   Neurological: He is alert.   Skin: Skin is warm. No rash noted. No cyanosis. "   Psychiatric: He has a normal mood and affect. His speech is normal and behavior is normal. Cognition and memory are normal.   Vitals reviewed.              Assessment/Plan     Willie was seen today for establish care.    Diagnoses and all orders for this visit:    Prediabetes  -     Hemoglobin A1c; Future    Hyperlipidemia, unspecified hyperlipidemia type  -     Comprehensive metabolic panel; Future  -     CBC auto differential; Future  -     Lipid panel; Future    Umbilical hernia without obstruction and without gangrene  -     US Abdomen Limited_Hernia; Future          Follow up for wellness.    Future Appointments   Date Time Provider Department Center   12/5/2019 10:45 AM Sarah Blackmon MD [facility] OPHTHAL [facility]   1/21/2020  9:00 AM Nadia Azevedo MD [facility] DERM [facility] [facility]       Gisselle Zelaya MD  [facility]

## 2019-11-01 ENCOUNTER — HOSPITAL ENCOUNTER (OUTPATIENT)
Dept: RADIOLOGY | Facility: HOSPITAL | Age: 79
Discharge: HOME | End: 2019-11-01
Attending: FAMILY MEDICINE
Payer: MEDICARE

## 2019-11-01 DIAGNOSIS — K42.9 UMBILICAL HERNIA WITHOUT OBSTRUCTION AND WITHOUT GANGRENE: ICD-10-CM

## 2019-11-01 PROCEDURE — 76705 ECHO EXAM OF ABDOMEN: CPT | Mod: TC

## 2019-11-01 PROCEDURE — 76705 US ABDOMEN LIMITED_HERNIA: ICD-10-PCS | Mod: 26,,, | Performed by: RADIOLOGY

## 2019-11-01 PROCEDURE — 76705 ECHO EXAM OF ABDOMEN: CPT | Mod: 26,,, | Performed by: RADIOLOGY

## 2019-11-05 ENCOUNTER — TELEPHONE (OUTPATIENT)
Dept: FAMILY MEDICINE | Facility: CLINIC | Age: 79
End: 2019-11-05

## 2019-11-05 DIAGNOSIS — K42.9 PERIUMBILICAL HERNIA: Primary | ICD-10-CM

## 2019-11-05 NOTE — TELEPHONE ENCOUNTER
----- Message from Neelima Reece sent at 11/5/2019 11:23 AM CST -----  Contact: patient   Type:  Patient Returning Call    Who Called: patient   Who Left Message for Patient:  n/a  Does the patient know what this is regarding?:  Yes, results   Best Call Back Number:  [phone] (home)

## 2019-11-11 ENCOUNTER — PATIENT OUTREACH (OUTPATIENT)
Dept: ADMINISTRATIVE | Facility: HOSPITAL | Age: 79
End: 2019-11-11

## 2019-12-04 ENCOUNTER — OFFICE VISIT (OUTPATIENT)
Dept: SURGERY | Facility: CLINIC | Age: 79
End: 2019-12-04
Payer: MEDICARE

## 2019-12-04 VITALS
WEIGHT: 186.5 LBS | DIASTOLIC BLOOD PRESSURE: 96 MMHG | HEART RATE: 76 BPM | BODY MASS INDEX: 26.38 KG/M2 | SYSTOLIC BLOOD PRESSURE: 163 MMHG

## 2019-12-04 DIAGNOSIS — K43.9 VENTRAL HERNIA WITHOUT OBSTRUCTION OR GANGRENE: Primary | ICD-10-CM

## 2019-12-04 PROCEDURE — 1101F PT FALLS ASSESS-DOCD LE1/YR: CPT | Mod: CPTII,S$GLB,, | Performed by: SURGERY

## 2019-12-04 PROCEDURE — 1159F PR MEDICATION LIST DOCUMENTED IN MEDICAL RECORD: ICD-10-PCS | Mod: S$GLB,,, | Performed by: SURGERY

## 2019-12-04 PROCEDURE — 99999 PR PBB SHADOW E&M-EST. PATIENT-LVL III: CPT | Mod: PBBFAC,,, | Performed by: SURGERY

## 2019-12-04 PROCEDURE — 1126F PR PAIN SEVERITY QUANTIFIED, NO PAIN PRESENT: ICD-10-PCS | Mod: S$GLB,,, | Performed by: SURGERY

## 2019-12-04 PROCEDURE — 99203 OFFICE O/P NEW LOW 30 MIN: CPT | Mod: S$GLB,,, | Performed by: SURGERY

## 2019-12-04 PROCEDURE — 1126F AMNT PAIN NOTED NONE PRSNT: CPT | Mod: S$GLB,,, | Performed by: SURGERY

## 2019-12-04 PROCEDURE — 99999 PR PBB SHADOW E&M-EST. PATIENT-LVL III: ICD-10-PCS | Mod: PBBFAC,,, | Performed by: SURGERY

## 2019-12-04 PROCEDURE — 1101F PR PT FALLS ASSESS DOC 0-1 FALLS W/OUT INJ PAST YR: ICD-10-PCS | Mod: CPTII,S$GLB,, | Performed by: SURGERY

## 2019-12-04 PROCEDURE — 99203 PR OFFICE/OUTPT VISIT, NEW, LEVL III, 30-44 MIN: ICD-10-PCS | Mod: S$GLB,,, | Performed by: SURGERY

## 2019-12-04 PROCEDURE — 1159F MED LIST DOCD IN RCRD: CPT | Mod: S$GLB,,, | Performed by: SURGERY

## 2019-12-04 NOTE — LETTER
December 15, 2019      Gisselle Zelaya MD  [address]  [facility] [address]           [facility] General Surgery  [address]  [facility] [address]  Phone: [phone]          Patient: Willie Liao   MR Number: [id number]   YOB: 1940   Date of Visit: 12/4/2019       Dear Dr. Gisselle Zelaya:    Thank you for referring Willie Liao to me for evaluation. Attached you will find relevant portions of my assessment and plan of care.    If you have questions, please do not hesitate to call me. I look forward to following Willie Liao along with you.    Sincerely,    Inocente Nuñez MD    Enclosure  CC:  No Recipients    If you would like to receive this communication electronically, please contact [email or web address] or [phone] to request more information on [employer] Link access.    For providers and/or their staff who would like to refer a patient to [facility], please contact us through our one-stop-shop provider referral line, [facility], at [phone].    If you feel you have received this communication in error or would no longer like to receive these types of communications, please e-mail [email or web address]

## 2019-12-05 ENCOUNTER — OFFICE VISIT (OUTPATIENT)
Dept: OPHTHALMOLOGY | Facility: CLINIC | Age: 79
End: 2019-12-05
Payer: MEDICARE

## 2019-12-05 DIAGNOSIS — H02.409 FACIAL PTOSIS: ICD-10-CM

## 2019-12-05 DIAGNOSIS — H52.7 REFRACTIVE ERROR: ICD-10-CM

## 2019-12-05 DIAGNOSIS — H02.125 MECHANICAL ECTROPION OF LOWER EYELIDS OF BOTH EYES: Primary | ICD-10-CM

## 2019-12-05 DIAGNOSIS — H02.122 MECHANICAL ECTROPION OF LOWER EYELIDS OF BOTH EYES: Primary | ICD-10-CM

## 2019-12-05 DIAGNOSIS — H02.055 TRICHIASIS WITHOUT ENTROPION LEFT LOWER EYELID: ICD-10-CM

## 2019-12-05 DIAGNOSIS — H40.013 OPEN ANGLE WITH BORDERLINE FINDINGS OF BOTH EYES: ICD-10-CM

## 2019-12-05 DIAGNOSIS — H25.13 NUCLEAR SCLEROSIS, BILATERAL: ICD-10-CM

## 2019-12-05 PROCEDURE — 68840 EXPLORE/IRRIGATE TEAR DUCTS: CPT | Mod: 50,S$GLB,, | Performed by: OPHTHALMOLOGY

## 2019-12-05 PROCEDURE — 99999 PR PBB SHADOW E&M-EST. PATIENT-LVL II: ICD-10-PCS | Mod: PBBFAC,,, | Performed by: OPHTHALMOLOGY

## 2019-12-05 PROCEDURE — 92285 EXTERNAL OCULAR PHOTOGRAPHY: CPT | Mod: S$GLB,,, | Performed by: OPHTHALMOLOGY

## 2019-12-05 PROCEDURE — 92285 EXTERNAL PHOTOGRAPHY - OU - BOTH EYES: ICD-10-PCS | Mod: S$GLB,,, | Performed by: OPHTHALMOLOGY

## 2019-12-05 PROCEDURE — 92014 PR EYE EXAM, EST PATIENT,COMPREHESV: ICD-10-PCS | Mod: 25,S$GLB,, | Performed by: OPHTHALMOLOGY

## 2019-12-05 PROCEDURE — 68840 PR EXPLORE LACRIMAL CANALICULI: ICD-10-PCS | Mod: 50,S$GLB,, | Performed by: OPHTHALMOLOGY

## 2019-12-05 PROCEDURE — 92014 COMPRE OPH EXAM EST PT 1/>: CPT | Mod: 25,S$GLB,, | Performed by: OPHTHALMOLOGY

## 2019-12-05 PROCEDURE — 99999 PR PBB SHADOW E&M-EST. PATIENT-LVL II: CPT | Mod: PBBFAC,,, | Performed by: OPHTHALMOLOGY

## 2019-12-05 NOTE — PROGRESS NOTES
HPI     ELVIN:02/08/2019 Priscilla Cantrell Md  Patient here for ectropion/Trichiasis in both eyes    Patient reports that for years, he has been having excessive watering OD>   OS, ongoing consistently, Patient has his Tear Ducts Irrigated and drained   and ectropion repair with Dr. Winslow but he is still having the problem   with excessive tearing, he is here for a second opinion to see if there is   another option to resolve the issue     Senile Ectropion of both lower lids  Trichasis without entropion   NS cataract   Open Angle Glaucoma   PVD  Pinguecula OD  Pterygium OS  Family history of glaucoma--- Mother    When did symptoms begin? Many Years   Is ectropion bothersome? Yes                    Last edited by Malcolm Merchant on 12/5/2019 11:15 AM. (History)            Assessment /Plan     For exam results, see Encounter Report.    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  This is been present for several years.  He has most recently had a right lower eyelid reconstruction by a colleague.  He has had a bilateral lower eyelid ectropion repair in the past by a different colleague.    On exam, the patient has bilateral mechanical ectropion worse on the left than the right.  The patient has bilateral mid facial ptosis contributing to the mechanical ectropion bilaterally.    On irrigation, the lacrimal system is patent bilaterally.    These findings were discussed with the patient has wife.    Recommend bilateral lower eyelid ectropion repair with midface lift.  We will plan for radiofrequency ablation of the aberrant  left lower eyelid eyelashes once the machine is available.    Informed consent obtained after extensive risks/benefits/alternatives were discussed with the patient including but not limited to pain, bleeding, infection, ocular injury, loss of the eye, asymmetry, need for revision in future, scarring.  Alternatives such as waiting were discussed.  All questions were answered.      Hold ASA, NSAIDS, and fish oil 5 to 7 days prior to procedure.    Return for surgery     Irrigation:  OD:patent lower punctum, canaliculus, and nld with 100 % flow to the nose  OS:patent lower punctum, canaliculus, and nld with 100% flow to the nose

## 2019-12-05 NOTE — LETTER
December 5, 2019      Luz Wells MD  [address] [place] [address] Dr  [address]  [facility] [address]           [facility]  [address]  [place] [address]  Phone: [phone]  Fax: [phone]          Patient: Willie Liao   MR Number: [id number]   YOB: 1940   Date of Visit: 12/5/2019       Dear Dr. Luz Wells:    Thank you for referring Willie Liao to me for evaluation. Attached you will find relevant portions of my assessment and plan of care.    If you have questions, please do not hesitate to call me. I look forward to following Willie Liao along with you.    Sincerely,    Sarah Blackmon MD    Enclosure  CC:  No Recipients    If you would like to receive this communication electronically, please contact [email or web address] or [phone] to request more information on [employer] access.    For providers and/or their staff who would like to refer a patient to [facility], please contact us through our one-stop-shop provider referral line, [facility], at [phone].    If you feel you have received this communication in error or would no longer like to receive these types of communications, please e-mail [email or web address]

## 2019-12-16 NOTE — PROGRESS NOTES
Subjective:       Patient ID: Willie Liao is a 79 y.o. male.    Chief Complaint: Consult (hernia repair consult)      HPI 79-year-old with periumbilical fat containing hernia on ultrasound.  Mild discomfort.    Past Medical History:   Diagnosis Date    Carotid stenosis     Gastritis     GERD (gastroesophageal reflux disease)     H. pylori infection     Hyperlipidemia     Shingles      Past Surgical History:   Procedure Laterality Date    arthroscope Left     knee arthroscopy, torn meniscus    COLONOSCOPY      Adrien         Current Outpatient Medications:     atorvastatin (LIPITOR) 20 MG tablet, Take 1 tablet (20 mg total) by mouth once daily., Disp: 90 tablet, Rfl: 3    CALCIUM ORAL, Take 1 tablet by mouth once daily., Disp: , Rfl:     CYANOCOBALAMIN, VITAMIN B-12, (VITAMIN B-12 ORAL), Take 1 tablet by mouth once daily., Disp: , Rfl:     FLUZONE HIGH-DOSE 2019-20, PF, 180 mcg/0.5 mL Syrg, ADM 0.5ML IM UTD, Disp: , Rfl: 0    glucosam/chond-msm1/C/angely/bor (GLUCOSAMINE-CHOND-MSM COMPLEX ORAL), Take 1 tablet by mouth once daily., Disp: , Rfl:     MULTIVITAMIN WITH MINERALS/LUT (MULTIVITAMIN 50 PLUS ORAL), Take 1 tablet by mouth once daily. , Disp: , Rfl:     OMEGA-3 FATTY ACIDS/FISH OIL (OMEGA 3 FISH OIL ORAL), Take 1 capsule by mouth once daily., Disp: , Rfl:     pantoprazole (PROTONIX) 40 MG tablet, Take 40 mg by mouth once daily., Disp: , Rfl:     VITAMIN C 500 MG tablet, , Disp: , Rfl:     VITAMIN E ACETATE ORAL, Take 1 capsule by mouth once daily., Disp: , Rfl:     Review of patient's allergies indicates:   Allergen Reactions    No known drug allergies        Family History   Problem Relation Age of Onset    Heart disease Father 65        MI    Hyperlipidemia Father     Stroke Father     COPD Mother     Glaucoma Mother     No Known Problems Sister     No Known Problems Brother     No Known Problems Daughter     Diabetes Unknown         uncles  and aunts     Cancer Paternal  Uncle         prostate cancer    Melanoma Neg Hx      Social History     Socioeconomic History    Marital status:      Spouse name: Not on file    Number of children: Not on file    Years of education: Not on file    Highest education level: Not on file   Occupational History    Not on file   Social Needs    Financial resource strain: Not on file    Food insecurity:     Worry: Not on file     Inability: Not on file    Transportation needs:     Medical: Not on file     Non-medical: Not on file   Tobacco Use    Smoking status: Former Smoker     Packs/day: 0.25     Last attempt to quit: 1997     Years since quittin.4    Smokeless tobacco: Never Used   Substance and Sexual Activity    Alcohol use: No    Drug use: No    Sexual activity: Yes   Lifestyle    Physical activity:     Days per week: Not on file     Minutes per session: Not on file    Stress: Not on file   Relationships    Social connections:     Talks on phone: Not on file     Gets together: Not on file     Attends Voodoo service: Not on file     Active member of club or organization: Not on file     Attends meetings of clubs or organizations: Not on file     Relationship status: Not on file   Other Topics Concern    Not on file   Social History Narrative    Not on file       Review of Systems   Constitutional: Negative for chills, fatigue, fever and unexpected weight change.   HENT: Negative for congestion, sore throat, trouble swallowing and voice change.    Eyes: Negative for redness and visual disturbance.   Respiratory: Negative for cough, shortness of breath and wheezing.    Cardiovascular: Negative for chest pain and palpitations.   Gastrointestinal: Negative for abdominal pain, blood in stool, nausea and vomiting.   Genitourinary: Negative for dysuria, frequency, hematuria and urgency.   Musculoskeletal: Negative for arthralgias, myalgias and neck pain.   Skin: Negative for rash and wound.   Allergic/Immunologic:  Negative.    Neurological: Negative for tremors, seizures, weakness and headaches.   Hematological: Does not bruise/bleed easily.   Psychiatric/Behavioral: Negative for confusion and dysphoric mood. The patient is not nervous/anxious.      Objective:     Physical Exam   Constitutional: He is oriented to person, place, and time. He appears well-developed and well-nourished. No distress.   HENT:   Head: Normocephalic and atraumatic.   Mouth/Throat: Oropharynx is clear and moist. No oropharyngeal exudate.   Eyes: Pupils are equal, round, and reactive to light. Conjunctivae and EOM are normal. No scleral icterus.   Neck: Normal range of motion. Neck supple. No thyromegaly present.   Cardiovascular: Normal rate, regular rhythm, normal heart sounds and intact distal pulses.   No murmur heard.  Pulmonary/Chest: Effort normal and breath sounds normal. No respiratory distress. He has no wheezes. He has no rales.   Abdominal: Soft. Bowel sounds are normal. He exhibits no distension. There is no tenderness. A hernia is present.   Musculoskeletal: Normal range of motion. He exhibits no edema or tenderness.   Lymphadenopathy:     He has no cervical adenopathy.   Neurological: He is alert and oriented to person, place, and time. No cranial nerve deficit.   Skin: Skin is warm and dry. No rash noted. No erythema.   Psychiatric: He has a normal mood and affect. His behavior is normal. Judgment normal.     Assessment:   No diagnosis found.     Ventral hernia.  Plan:      1.  Patient will call when he is ready to schedule surgery. The options were discussed.

## 2019-12-23 ENCOUNTER — TELEPHONE (OUTPATIENT)
Dept: OPHTHALMOLOGY | Facility: CLINIC | Age: 79
End: 2019-12-23

## 2019-12-23 DIAGNOSIS — H52.7 REFRACTIVE ERROR: ICD-10-CM

## 2019-12-23 DIAGNOSIS — H02.055 TRICHIASIS WITHOUT ENTROPION LEFT LOWER EYELID: ICD-10-CM

## 2019-12-23 DIAGNOSIS — H25.13 NUCLEAR SCLEROSIS OF BOTH EYES: ICD-10-CM

## 2019-12-23 DIAGNOSIS — H02.409 FACIAL PTOSIS: ICD-10-CM

## 2019-12-23 DIAGNOSIS — H02.122 MECHANICAL ECTROPION OF LOWER EYELIDS OF BOTH EYES: Primary | ICD-10-CM

## 2019-12-23 DIAGNOSIS — H02.125 MECHANICAL ECTROPION OF LOWER EYELIDS OF BOTH EYES: Primary | ICD-10-CM

## 2019-12-30 ENCOUNTER — TELEPHONE (OUTPATIENT)
Dept: OPHTHALMOLOGY | Facility: CLINIC | Age: 79
End: 2019-12-30

## 2019-12-30 DIAGNOSIS — H02.125 MECHANICAL ECTROPION OF LOWER EYELIDS OF BOTH EYES: Primary | ICD-10-CM

## 2019-12-30 DIAGNOSIS — H25.13 NUCLEAR SCLEROSIS, BILATERAL: ICD-10-CM

## 2019-12-30 DIAGNOSIS — H02.055 TRICHIASIS WITHOUT ENTROPION LEFT LOWER EYELID: ICD-10-CM

## 2019-12-30 DIAGNOSIS — H02.122 MECHANICAL ECTROPION OF LOWER EYELIDS OF BOTH EYES: Primary | ICD-10-CM

## 2019-12-30 DIAGNOSIS — H02.409 FACIAL PTOSIS: ICD-10-CM

## 2020-02-17 ENCOUNTER — TELEPHONE (OUTPATIENT)
Dept: OPHTHALMOLOGY | Facility: CLINIC | Age: 80
End: 2020-02-17

## 2020-02-17 NOTE — TELEPHONE ENCOUNTER
Spoke to pt's wife (Genie) regarding her  Willie Liao. She wanted to know if her  needed to be seen prior to his surgery in March. Advised her he does not need to be seen unless there is any worsening of symptoms or sign of infection.

## 2020-02-17 NOTE — TELEPHONE ENCOUNTER
----- Message from Landy Chairez sent at 2/17/2020  9:39 AM CST -----  Contact: Genie Liao(wife)  Mrs. Liao would like to know if her  needs to see Dr. Blackmon before the surgery. She can be reached at [phone]

## 2020-02-21 ENCOUNTER — DOCUMENTATION ONLY (OUTPATIENT)
Dept: FAMILY MEDICINE | Facility: CLINIC | Age: 80
End: 2020-02-21

## 2020-02-21 NOTE — PROGRESS NOTES
Pre-Visit Chart Review  For Appointment Scheduled on 2/24/2020    Health Maintenance Due   Topic Date Due    Aspirin/Antiplatelet Therapy  07/06/1958

## 2020-02-24 ENCOUNTER — LAB VISIT (OUTPATIENT)
Dept: LAB | Facility: HOSPITAL | Age: 80
End: 2020-02-24
Attending: FAMILY MEDICINE
Payer: MEDICARE

## 2020-02-24 ENCOUNTER — OFFICE VISIT (OUTPATIENT)
Dept: FAMILY MEDICINE | Facility: CLINIC | Age: 80
End: 2020-02-24
Payer: MEDICARE

## 2020-02-24 VITALS
TEMPERATURE: 98 F | HEIGHT: 71 IN | HEART RATE: 84 BPM | OXYGEN SATURATION: 96 % | DIASTOLIC BLOOD PRESSURE: 60 MMHG | BODY MASS INDEX: 26.64 KG/M2 | WEIGHT: 190.25 LBS | SYSTOLIC BLOOD PRESSURE: 110 MMHG

## 2020-02-24 DIAGNOSIS — Z01.818 PREOP EXAMINATION: Primary | ICD-10-CM

## 2020-02-24 DIAGNOSIS — Z01.818 PREOP EXAMINATION: ICD-10-CM

## 2020-02-24 LAB
BASOPHILS # BLD AUTO: 0.03 K/UL (ref 0–0.2)
BASOPHILS NFR BLD: 0.4 % (ref 0–1.9)
DIFFERENTIAL METHOD: ABNORMAL
EOSINOPHIL # BLD AUTO: 0.3 K/UL (ref 0–0.5)
EOSINOPHIL NFR BLD: 3.7 % (ref 0–8)
ERYTHROCYTE [DISTWIDTH] IN BLOOD BY AUTOMATED COUNT: 12.3 % (ref 11.5–14.5)
HCT VFR BLD AUTO: 44.2 % (ref 40–54)
HGB BLD-MCNC: 14.8 G/DL (ref 14–18)
IMM GRANULOCYTES # BLD AUTO: 0.02 K/UL (ref 0–0.04)
IMM GRANULOCYTES NFR BLD AUTO: 0.2 % (ref 0–0.5)
LYMPHOCYTES # BLD AUTO: 2.8 K/UL (ref 1–4.8)
LYMPHOCYTES NFR BLD: 33.7 % (ref 18–48)
MCH RBC QN AUTO: 31.6 PG (ref 27–31)
MCHC RBC AUTO-ENTMCNC: 33.5 G/DL (ref 32–36)
MCV RBC AUTO: 94 FL (ref 82–98)
MONOCYTES # BLD AUTO: 0.7 K/UL (ref 0.3–1)
MONOCYTES NFR BLD: 7.8 % (ref 4–15)
NEUTROPHILS # BLD AUTO: 4.5 K/UL (ref 1.8–7.7)
NEUTROPHILS NFR BLD: 54.2 % (ref 38–73)
NRBC BLD-RTO: 0 /100 WBC
PLATELET # BLD AUTO: 191 K/UL (ref 150–350)
PMV BLD AUTO: 12.5 FL (ref 9.2–12.9)
RBC # BLD AUTO: 4.68 M/UL (ref 4.6–6.2)
WBC # BLD AUTO: 8.31 K/UL (ref 3.9–12.7)

## 2020-02-24 PROCEDURE — 1159F MED LIST DOCD IN RCRD: CPT | Mod: S$GLB,,, | Performed by: FAMILY MEDICINE

## 2020-02-24 PROCEDURE — 99999 PR PBB SHADOW E&M-EST. PATIENT-LVL III: ICD-10-PCS | Mod: PBBFAC,,, | Performed by: FAMILY MEDICINE

## 2020-02-24 PROCEDURE — 99214 PR OFFICE/OUTPT VISIT, EST, LEVL IV, 30-39 MIN: ICD-10-PCS | Mod: S$GLB,,, | Performed by: FAMILY MEDICINE

## 2020-02-24 PROCEDURE — 93010 EKG 12-LEAD: ICD-10-PCS | Mod: S$GLB,,, | Performed by: INTERNAL MEDICINE

## 2020-02-24 PROCEDURE — 36415 COLL VENOUS BLD VENIPUNCTURE: CPT | Mod: PO

## 2020-02-24 PROCEDURE — 1159F PR MEDICATION LIST DOCUMENTED IN MEDICAL RECORD: ICD-10-PCS | Mod: S$GLB,,, | Performed by: FAMILY MEDICINE

## 2020-02-24 PROCEDURE — 99999 PR PBB SHADOW E&M-EST. PATIENT-LVL III: CPT | Mod: PBBFAC,,, | Performed by: FAMILY MEDICINE

## 2020-02-24 PROCEDURE — 1101F PR PT FALLS ASSESS DOC 0-1 FALLS W/OUT INJ PAST YR: ICD-10-PCS | Mod: S$GLB,,, | Performed by: FAMILY MEDICINE

## 2020-02-24 PROCEDURE — 93005 EKG 12-LEAD: ICD-10-PCS | Mod: S$GLB,,, | Performed by: FAMILY MEDICINE

## 2020-02-24 PROCEDURE — 80053 COMPREHEN METABOLIC PANEL: CPT

## 2020-02-24 PROCEDURE — 1101F PT FALLS ASSESS-DOCD LE1/YR: CPT | Mod: S$GLB,,, | Performed by: FAMILY MEDICINE

## 2020-02-24 PROCEDURE — 85025 COMPLETE CBC W/AUTO DIFF WBC: CPT

## 2020-02-24 PROCEDURE — 99214 OFFICE O/P EST MOD 30 MIN: CPT | Mod: S$GLB,,, | Performed by: FAMILY MEDICINE

## 2020-02-24 PROCEDURE — 93005 ELECTROCARDIOGRAM TRACING: CPT | Mod: S$GLB,,, | Performed by: FAMILY MEDICINE

## 2020-02-24 PROCEDURE — 93010 ELECTROCARDIOGRAM REPORT: CPT | Mod: S$GLB,,, | Performed by: INTERNAL MEDICINE

## 2020-02-24 RX ORDER — VITAMIN A 3000 MCG
10000 CAPSULE ORAL DAILY
COMMUNITY
End: 2024-02-27

## 2020-02-24 NOTE — PROGRESS NOTES
Subjective:       Patient ID: Willie Liao is a 79 y.o. male.    Chief Complaint: Pre-op Exam    HPI    Mr. Liao presents to clinic for his preop exam.     Is having tear duct surgery with Dr. Blackmon on 3/13.     Has had eye surgery in the past under anesthesia. Denies any issues with previous surgeries.    Past Medical History:   Diagnosis Date    Carotid stenosis     Gastritis     GERD (gastroesophageal reflux disease)     H. pylori infection     Hyperlipidemia     Shingles        Past Surgical History:   Procedure Laterality Date    arthroscope Left     knee arthroscopy, torn meniscus    COLONOSCOPY      Adrien       Family History   Problem Relation Age of Onset    Heart disease Father 65        MI    Hyperlipidemia Father     Stroke Father     COPD Mother     Glaucoma Mother     No Known Problems Sister     No Known Problems Brother     No Known Problems Daughter     Diabetes Unknown         uncles  and aunts     Cancer Paternal Uncle         prostate cancer    Melanoma Neg Hx        Social History     Tobacco Use    Smoking status: Former Smoker     Packs/day: 0.25     Last attempt to quit: 1997     Years since quittin.6    Smokeless tobacco: Never Used   Substance Use Topics    Alcohol use: No    Drug use: No       Social History     Substance and Sexual Activity   Sexual Activity Yes          Current Outpatient Medications:     CALCIUM ORAL, Take 1 tablet by mouth once daily., Disp: , Rfl:     CYANOCOBALAMIN, VITAMIN B-12, (VITAMIN B-12 ORAL), Take 1 tablet by mouth once daily., Disp: , Rfl:     FLUZONE HIGH-DOSE , PF, 180 mcg/0.5 mL Syrg, ADM 0.5ML IM UTD, Disp: , Rfl: 0    glucosam/chond-msm1/C/angely/bor (GLUCOSAMINE-CHOND-MSM COMPLEX ORAL), Take 1 tablet by mouth once daily., Disp: , Rfl:     MULTIVITAMIN WITH MINERALS/LUT (MULTIVITAMIN 50 PLUS ORAL), Take 1 tablet by mouth once daily. , Disp: , Rfl:     OMEGA-3 FATTY ACIDS/FISH OIL (OMEGA 3 FISH OIL  "ORAL), Take 1 capsule by mouth once daily., Disp: , Rfl:     pantoprazole (PROTONIX) 40 MG tablet, Take 40 mg by mouth once daily., Disp: , Rfl:     vitamin A 21570 UNIT capsule, Take 10,000 Units by mouth once daily., Disp: , Rfl:     VITAMIN C 500 MG tablet, , Disp: , Rfl:     VITAMIN E ACETATE ORAL, Take 1 capsule by mouth once daily., Disp: , Rfl:     atorvastatin (LIPITOR) 20 MG tablet, Take 1 tablet (20 mg total) by mouth once daily., Disp: 90 tablet, Rfl: 3     Review of patient's allergies indicates:   Allergen Reactions    No known drug allergies             Review of Systems   Constitutional: Negative for chills and fever.   HENT: Negative for congestion and sore throat.    Eyes: Negative for visual disturbance.   Respiratory: Negative for cough and shortness of breath.    Cardiovascular: Negative for chest pain.   Gastrointestinal: Negative for abdominal pain, constipation, diarrhea, nausea and vomiting.   Genitourinary: Negative for dysuria.   Musculoskeletal: Negative for joint swelling.   Skin: Negative for rash and wound.   Neurological: Negative for dizziness and headaches.   Hematological: Does not bruise/bleed easily.           Objective:          Vitals:    02/24/20 1536   BP: 110/60   Pulse: 84   Temp: 98.1 °F (36.7 °C)   SpO2: 96%   Weight: 86.3 kg (190 lb 4.1 oz)   Height: 5' 10.5" (1.791 m)       Physical Exam   Constitutional: He appears well-developed and well-nourished.   HENT:   Head: Normocephalic and atraumatic.   Eyes: Conjunctivae are normal.   Cardiovascular: Normal rate, regular rhythm and normal heart sounds.   Pulmonary/Chest: Effort normal and breath sounds normal.   Abdominal: Soft. Bowel sounds are normal. There is no tenderness.   Musculoskeletal: Normal range of motion.   Neurological: He is alert.   Skin: Skin is warm.   Psychiatric: He has a normal mood and affect. His behavior is normal.   Vitals reviewed.              Assessment/Plan     Willie was seen today " for pre-op exam.    Diagnoses and all orders for this visit:    Preop examination  -     Comprehensive metabolic panel; Future  -     CBC auto differential; Future  -     IN OFFICE EKG 12-LEAD (to [place]). No acute findings.     Patient is medically optimized for surgery.       Follow up if symptoms worsen or fail to improve.    Future Appointments   Date Time Provider Department Center   3/19/2020  9:45 AM Sarah Blackmon MD [facility] Jareth Zelaya MD  [facility]

## 2020-02-25 LAB
ALBUMIN SERPL BCP-MCNC: 3.7 G/DL (ref 3.5–5.2)
ALP SERPL-CCNC: 96 U/L (ref 55–135)
ALT SERPL W/O P-5'-P-CCNC: 17 U/L (ref 10–44)
ANION GAP SERPL CALC-SCNC: 7 MMOL/L (ref 8–16)
AST SERPL-CCNC: 18 U/L (ref 10–40)
BILIRUB SERPL-MCNC: 0.9 MG/DL (ref 0.1–1)
BUN SERPL-MCNC: 16 MG/DL (ref 8–23)
CALCIUM SERPL-MCNC: 9 MG/DL (ref 8.7–10.5)
CHLORIDE SERPL-SCNC: 107 MMOL/L (ref 95–110)
CO2 SERPL-SCNC: 26 MMOL/L (ref 23–29)
CREAT SERPL-MCNC: 1 MG/DL (ref 0.5–1.4)
EST. GFR  (AFRICAN AMERICAN): >60 ML/MIN/1.73 M^2
EST. GFR  (NON AFRICAN AMERICAN): >60 ML/MIN/1.73 M^2
GLUCOSE SERPL-MCNC: 129 MG/DL (ref 70–110)
POTASSIUM SERPL-SCNC: 4.1 MMOL/L (ref 3.5–5.1)
PROT SERPL-MCNC: 6.8 G/DL (ref 6–8.4)
SODIUM SERPL-SCNC: 140 MMOL/L (ref 136–145)

## 2020-03-12 ENCOUNTER — ANESTHESIA EVENT (OUTPATIENT)
Dept: SURGERY | Facility: HOSPITAL | Age: 80
End: 2020-03-12
Payer: MEDICARE

## 2020-03-12 ENCOUNTER — TELEPHONE (OUTPATIENT)
Dept: OPHTHALMOLOGY | Facility: CLINIC | Age: 80
End: 2020-03-12

## 2020-03-12 NOTE — PRE-PROCEDURE INSTRUCTIONS
PREOP INSTRUCTIONS:No solid food ,milk or milk products for 8 hours prior to procedure.Clear liquids are allowed up to 2 hours before procedure.Clear liquids are:water,apple juice,gatorade & powerade.Shower instructions as well as directions to the Surgery Center were given.Patient encouraged to wear loose fitting,comfortable clothing.Medication instructions for pm prior to and am of procedure reviewed.Instructed patient to avoid taking vitamins,supplements,aspirin and ibuprofen the morning of surgery. Patient stated an understanding.Patient stated that he stopped taking all of his medications a week ago and wont take them again until after his surgery.    Patient denies any side effects or issues with anesthesia or sedation.

## 2020-03-12 NOTE — H&P
Preoperative H&P prior to  bilateral lower eyelid ectropion repair with midface lift.    CC: chronic tearing and irritation    HPI: Willie Liao is a 79 y.o. male with bilateral chronic tearing and irritation worse on the right than the left.  This is been present for several years.  He has most recently had a right lower eyelid reconstruction by a colleague.  He has had a bilateral lower eyelid ectropion repair in the past by a different colleague.        PMHx: has a past medical history of Carotid stenosis, Gastritis, GERD (gastroesophageal reflux disease), H. pylori infection, Hyperlipidemia, and Shingles.     PSurgHx:  has a past surgical history that includes arthroscope (Left) and Colonoscopy.     Medications:  Prior to Admission medications    Medication Sig Start Date End Date Taking? Authorizing Provider   atorvastatin (LIPITOR) 20 MG tablet Take 1 tablet (20 mg total) by mouth once daily. 1/21/19 1/21/20  DAVID FlorianP-RHODA   CALCIUM ORAL Take 1 tablet by mouth once daily.    Historical Provider, MD   CYANOCOBALAMIN, VITAMIN B-12, (VITAMIN B-12 ORAL) Take 1 tablet by mouth once daily.    Historical Provider, MD   FLUZONE HIGH-DOSE 2019-20, PF, 180 mcg/0.5 mL Syrg ADM 0.5ML IM UTD 9/18/19   Historical Provider, MD   glucosam/chond-msm1/C/angely/bor (GLUCOSAMINE-CHOND-MSM COMPLEX ORAL) Take 1 tablet by mouth once daily.    Historical Provider, MD   MULTIVITAMIN WITH MINERALS/LUT (MULTIVITAMIN 50 PLUS ORAL) Take 1 tablet by mouth once daily.  8/16/16   Historical Provider, MD   OMEGA-3 FATTY ACIDS/FISH OIL (OMEGA 3 FISH OIL ORAL) Take 1 capsule by mouth once daily.    Historical Provider, MD   pantoprazole (PROTONIX) 40 MG tablet Take 40 mg by mouth once daily.    Historical Provider, MD   vitamin A 97813 UNIT capsule Take 10,000 Units by mouth once daily.    Historical Provider, MD   VITAMIN C 500 MG tablet  6/15/16   Historical Provider, MD   VITAMIN E ACETATE ORAL Take 1 capsule by mouth once  daily.    Historical Provider, MD       Allergies:is allergic to no known drug allergies.      Social: reports that he quit smoking about 22 years ago. He smoked 0.25 packs per day. He has never used smokeless tobacco. He reports that he does not drink alcohol or use drugs.     Family Hx:family history includes COPD in his mother; Cancer in his paternal uncle; Diabetes in his unknown relative; Glaucoma in his mother; Heart disease (age of onset: 65) in his father; Hyperlipidemia in his father; No Known Problems in his brother, daughter, and sister; Stroke in his father.     ROS: Negative x 10 except for eye complaints pre-operatively; negative for fever, chills, weight loss, nausea, vomiting, diarrhea, shortness of breath, nasal discharge, cough, abdominal pain, dyspnea, difficulty moving arms and legs, confusion, dysuria, palpitations, or chest pain     PE:  Patient appears well developed and well nourished and is in no acute distress, is normocephalic and atraumatic. Pt is resting comfortably and breathing at a normal rate. Pulses are intact and heart is beating at a normal rate. Abdomen is not distended. Pt is able to ambulate and move arms and legs appropriately. Pt is awake, alert, and oriented x3. See ophthalmology clinic note for full ocular details of examination findings.    On exam, the patient has bilateral mechanical ectropion worse on the left than the right.  The patient has bilateral mid facial ptosis contributing to the mechanical ectropion bilaterally.     On irrigation, the lacrimal system is patent bilaterally.  Irrigation:  OD:patent lower punctum, canaliculus, and nld with 100 % flow to the nose  OS:patent lower punctum, canaliculus, and nld with 100% flow to the nose         Assessment/Plan:   These findings were discussed with the patient and his wife.      Recommend bilateral lower eyelid ectropion repair with midface lift.  We will plan for radiofrequency ablation of the aberrant left lower  eyelid eyelashes once the machine is available.     Informed consent obtained after extensive risks/benefits/alternatives were discussed with the patient including but not limited to pain, bleeding, infection, ocular injury, loss of the eye, asymmetry, need for revision in future, scarring.  Alternatives such as waiting were discussed.  All questions were answered.       Hold ASA, NSAIDS, and fish oil 5 to 7 days prior to procedure.    H&P completed on 3/12/20 has been reviewed, the patient has been examined and:  I concur with the findings and no changes have occurred since H&P was written.    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

## 2020-03-13 ENCOUNTER — HOSPITAL ENCOUNTER (OUTPATIENT)
Facility: HOSPITAL | Age: 80
Discharge: HOME | End: 2020-03-13
Attending: OPHTHALMOLOGY | Admitting: OPHTHALMOLOGY
Payer: MEDICARE

## 2020-03-13 ENCOUNTER — ANESTHESIA (OUTPATIENT)
Dept: SURGERY | Facility: HOSPITAL | Age: 80
End: 2020-03-13
Payer: MEDICARE

## 2020-03-13 VITALS
HEIGHT: 70 IN | OXYGEN SATURATION: 96 % | WEIGHT: 187 LBS | TEMPERATURE: 98 F | DIASTOLIC BLOOD PRESSURE: 80 MMHG | RESPIRATION RATE: 18 BRPM | SYSTOLIC BLOOD PRESSURE: 163 MMHG | BODY MASS INDEX: 26.77 KG/M2 | HEART RATE: 75 BPM

## 2020-03-13 DIAGNOSIS — H02.105 ECTROPION OF BOTH LOWER EYELIDS: ICD-10-CM

## 2020-03-13 DIAGNOSIS — H02.102 ECTROPION OF BOTH LOWER EYELIDS: ICD-10-CM

## 2020-03-13 DIAGNOSIS — H02.135 SENILE ECTROPION OF BOTH LOWER EYELIDS: Primary | ICD-10-CM

## 2020-03-13 DIAGNOSIS — H02.132 SENILE ECTROPION OF BOTH LOWER EYELIDS: Primary | ICD-10-CM

## 2020-03-13 PROCEDURE — D9220A PRA ANESTHESIA: ICD-10-PCS | Mod: ANES,,, | Performed by: STUDENT IN AN ORGANIZED HEALTH CARE EDUCATION/TRAINING PROGRAM

## 2020-03-13 PROCEDURE — 63600175 PHARM REV CODE 636 W HCPCS: Performed by: NURSE ANESTHETIST, CERTIFIED REGISTERED

## 2020-03-13 PROCEDURE — 63600175 PHARM REV CODE 636 W HCPCS: Performed by: STUDENT IN AN ORGANIZED HEALTH CARE EDUCATION/TRAINING PROGRAM

## 2020-03-13 PROCEDURE — 15730 MDFC FLAP W/PRSRV VASC PEDCL: CPT | Mod: RT,,, | Performed by: OPHTHALMOLOGY

## 2020-03-13 PROCEDURE — 71000045 HC DOSC ROUTINE RECOVERY EA ADD'L HR: Performed by: OPHTHALMOLOGY

## 2020-03-13 PROCEDURE — D9220A PRA ANESTHESIA: ICD-10-PCS | Mod: CRNA,,, | Performed by: NURSE ANESTHETIST, CERTIFIED REGISTERED

## 2020-03-13 PROCEDURE — 37000008 HC ANESTHESIA 1ST 15 MINUTES: Performed by: OPHTHALMOLOGY

## 2020-03-13 PROCEDURE — 71000016 HC POSTOP RECOV ADDL HR: Performed by: OPHTHALMOLOGY

## 2020-03-13 PROCEDURE — D9220A PRA ANESTHESIA: Mod: CRNA,,, | Performed by: NURSE ANESTHETIST, CERTIFIED REGISTERED

## 2020-03-13 PROCEDURE — D9220A PRA ANESTHESIA: Mod: ANES,,, | Performed by: STUDENT IN AN ORGANIZED HEALTH CARE EDUCATION/TRAINING PROGRAM

## 2020-03-13 PROCEDURE — 71000044 HC DOSC ROUTINE RECOVERY FIRST HOUR: Performed by: OPHTHALMOLOGY

## 2020-03-13 PROCEDURE — 63600175 PHARM REV CODE 636 W HCPCS: Performed by: OPHTHALMOLOGY

## 2020-03-13 PROCEDURE — 25000003 PHARM REV CODE 250: Performed by: OPHTHALMOLOGY

## 2020-03-13 PROCEDURE — 67917 REPAIR EYELID DEFECT: CPT | Mod: 51,50,, | Performed by: OPHTHALMOLOGY

## 2020-03-13 PROCEDURE — 36000706: Performed by: OPHTHALMOLOGY

## 2020-03-13 PROCEDURE — 71000015 HC POSTOP RECOV 1ST HR: Performed by: OPHTHALMOLOGY

## 2020-03-13 PROCEDURE — 25000003 PHARM REV CODE 250

## 2020-03-13 PROCEDURE — 25000003 PHARM REV CODE 250: Performed by: STUDENT IN AN ORGANIZED HEALTH CARE EDUCATION/TRAINING PROGRAM

## 2020-03-13 PROCEDURE — 37000009 HC ANESTHESIA EA ADD 15 MINS: Performed by: OPHTHALMOLOGY

## 2020-03-13 PROCEDURE — 67917 PR FIX ECTROPION,ENTENSV LID REPAIR: ICD-10-PCS | Mod: 51,50,, | Performed by: OPHTHALMOLOGY

## 2020-03-13 PROCEDURE — 25000003 PHARM REV CODE 250: Performed by: NURSE ANESTHETIST, CERTIFIED REGISTERED

## 2020-03-13 PROCEDURE — 15730 PR CREATION FLAP, MIDFACE  W/PRESERVATION OF VASC PEDICLES: ICD-10-PCS | Mod: RT,,, | Performed by: OPHTHALMOLOGY

## 2020-03-13 PROCEDURE — S0020 INJECTION, BUPIVICAINE HYDRO: HCPCS | Performed by: OPHTHALMOLOGY

## 2020-03-13 PROCEDURE — 36000707: Performed by: OPHTHALMOLOGY

## 2020-03-13 RX ORDER — ERYTHROMYCIN 5 MG/G
OINTMENT OPHTHALMIC 3 TIMES DAILY
Qty: 1 TUBE | Refills: 2 | Status: SHIPPED | OUTPATIENT
Start: 2020-03-13 | End: 2021-06-29

## 2020-03-13 RX ORDER — HYDROMORPHONE HYDROCHLORIDE 1 MG/ML
0.5 INJECTION, SOLUTION INTRAMUSCULAR; INTRAVENOUS; SUBCUTANEOUS EVERY 5 MIN PRN
Status: DISCONTINUED | OUTPATIENT
Start: 2020-03-13 | End: 2020-03-13 | Stop reason: HOSPADM

## 2020-03-13 RX ORDER — TOBRAMYCIN AND DEXAMETHASONE 3; 1 MG/ML; MG/ML
SUSPENSION/ DROPS OPHTHALMIC
Status: DISCONTINUED | OUTPATIENT
Start: 2020-03-13 | End: 2020-03-13 | Stop reason: HOSPADM

## 2020-03-13 RX ORDER — LIDOCAINE HCL/EPINEPHRINE/PF 2%-1:200K
VIAL (ML) INJECTION
Status: DISCONTINUED | OUTPATIENT
Start: 2020-03-13 | End: 2020-03-13 | Stop reason: HOSPADM

## 2020-03-13 RX ORDER — LIDOCAINE HCL/EPINEPHRINE/PF 2%-1:200K
VIAL (ML) INJECTION
Status: DISCONTINUED
Start: 2020-03-13 | End: 2020-03-13 | Stop reason: HOSPADM

## 2020-03-13 RX ORDER — PROPOFOL 10 MG/ML
VIAL (ML) INTRAVENOUS
Status: DISCONTINUED | OUTPATIENT
Start: 2020-03-13 | End: 2020-03-13

## 2020-03-13 RX ORDER — BUPIVACAINE HYDROCHLORIDE 5 MG/ML
INJECTION, SOLUTION EPIDURAL; INTRACAUDAL
Status: DISCONTINUED | OUTPATIENT
Start: 2020-03-13 | End: 2020-03-13 | Stop reason: HOSPADM

## 2020-03-13 RX ORDER — FENTANYL CITRATE 50 UG/ML
INJECTION, SOLUTION INTRAMUSCULAR; INTRAVENOUS
Status: DISCONTINUED | OUTPATIENT
Start: 2020-03-13 | End: 2020-03-13

## 2020-03-13 RX ORDER — TETRACAINE HYDROCHLORIDE 5 MG/ML
1 SOLUTION OPHTHALMIC ONCE
Status: ACTIVE | OUTPATIENT
Start: 2020-03-13

## 2020-03-13 RX ORDER — BUPIVACAINE HYDROCHLORIDE 5 MG/ML
INJECTION, SOLUTION EPIDURAL; INTRACAUDAL
Status: DISCONTINUED
Start: 2020-03-13 | End: 2020-03-13 | Stop reason: HOSPADM

## 2020-03-13 RX ORDER — ONDANSETRON 2 MG/ML
INJECTION INTRAMUSCULAR; INTRAVENOUS
Status: DISCONTINUED | OUTPATIENT
Start: 2020-03-13 | End: 2020-03-13

## 2020-03-13 RX ORDER — HYDROCODONE BITARTRATE AND ACETAMINOPHEN 5; 325 MG/1; MG/1
1 TABLET ORAL EVERY 6 HOURS PRN
Qty: 16 TABLET | Refills: 0 | Status: SHIPPED | OUTPATIENT
Start: 2020-03-13 | End: 2021-06-29

## 2020-03-13 RX ORDER — ACETAMINOPHEN 500 MG
500 TABLET ORAL ONCE
Status: COMPLETED | OUTPATIENT
Start: 2020-03-13 | End: 2020-03-13

## 2020-03-13 RX ORDER — HYDROCODONE BITARTRATE AND ACETAMINOPHEN 5; 325 MG/1; MG/1
1 TABLET ORAL ONCE
Status: COMPLETED | OUTPATIENT
Start: 2020-03-13 | End: 2020-03-13

## 2020-03-13 RX ORDER — TETRACAINE HYDROCHLORIDE 5 MG/ML
SOLUTION OPHTHALMIC
Status: DISCONTINUED
Start: 2020-03-13 | End: 2020-03-13 | Stop reason: HOSPADM

## 2020-03-13 RX ORDER — SODIUM CHLORIDE 0.9 % (FLUSH) 0.9 %
10 SYRINGE (ML) INJECTION
Status: DISCONTINUED | OUTPATIENT
Start: 2020-03-13 | End: 2020-03-13 | Stop reason: HOSPADM

## 2020-03-13 RX ORDER — LIDOCAINE HYDROCHLORIDE 20 MG/ML
INJECTION INTRAVENOUS
Status: DISCONTINUED | OUTPATIENT
Start: 2020-03-13 | End: 2020-03-13

## 2020-03-13 RX ORDER — TOBRAMYCIN AND DEXAMETHASONE 3; 1 MG/ML; MG/ML
SUSPENSION/ DROPS OPHTHALMIC
Status: DISCONTINUED
Start: 2020-03-13 | End: 2020-03-13 | Stop reason: HOSPADM

## 2020-03-13 RX ADMIN — FENTANYL CITRATE 25 MCG: 50 INJECTION, SOLUTION INTRAMUSCULAR; INTRAVENOUS at 11:03

## 2020-03-13 RX ADMIN — PROPOFOL 120 MG: 10 INJECTION, EMULSION INTRAVENOUS at 09:03

## 2020-03-13 RX ADMIN — ACETAMINOPHEN 500 MG: 500 TABLET ORAL at 01:03

## 2020-03-13 RX ADMIN — SODIUM CHLORIDE, SODIUM GLUCONATE, SODIUM ACETATE, POTASSIUM CHLORIDE, MAGNESIUM CHLORIDE, SODIUM PHOSPHATE, DIBASIC, AND POTASSIUM PHOSPHATE: .53; .5; .37; .037; .03; .012; .00082 INJECTION, SOLUTION INTRAVENOUS at 09:03

## 2020-03-13 RX ADMIN — HYDROMORPHONE HYDROCHLORIDE 0.5 MG: 1 INJECTION, SOLUTION INTRAMUSCULAR; INTRAVENOUS; SUBCUTANEOUS at 01:03

## 2020-03-13 RX ADMIN — HYDROCODONE BITARTRATE AND ACETAMINOPHEN 1 TABLET: 5; 325 TABLET ORAL at 02:03

## 2020-03-13 RX ADMIN — LIDOCAINE HYDROCHLORIDE 50 MG: 20 INJECTION, SOLUTION INTRAVENOUS at 09:03

## 2020-03-13 RX ADMIN — PROPOFOL 50 MG: 10 INJECTION, EMULSION INTRAVENOUS at 09:03

## 2020-03-13 RX ADMIN — DEXTROSE 2 G: 50 INJECTION, SOLUTION INTRAVENOUS at 10:03

## 2020-03-13 RX ADMIN — ONDANSETRON 4 MG: 2 INJECTION INTRAMUSCULAR; INTRAVENOUS at 12:03

## 2020-03-13 RX ADMIN — FENTANYL CITRATE 25 MCG: 50 INJECTION, SOLUTION INTRAMUSCULAR; INTRAVENOUS at 10:03

## 2020-03-13 NOTE — OP NOTE
Operative Note  Ophthalmology Service     Date of Procedure:  3/13/2020    Attending: Sarah Blackmon MD     Resident: Peri Galeana MD     Procedures:   1. Bilateral midface lift (54691)  2. 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 The patient has a history of surgery X 2 with other physicians. Patient understands the risk of pain, bleeding, infection, ocular injury, loss of the eye, asymmetry, need for revision in future, scarring.      Procedure in detail:  Informed consent was obtained in the preoperative area. The patient was brought to the operating room. After adequate anesthesia was achieved, approximately 8 cc of subcutaneous lidocaine 2% with epinephrine 1:200,000 was injected near the orbital rims and the lateral aspects of the upper and lower eyelids of both eyes. The full face was prepped and draped in the usual ophthalmic fashion using topical Betadine.     Attention was then directed to the right eye. A lateral canthal incision was performed using a #15 scalpel. Monopolar cautery was used for hemostasis.  Blunt dissection to the orbital rim was performed with Qtips and monopolar cautery. Ro scissors were used to perform a canthotomy.  An inferior cantholysis was performed with Ro scissors and  monopolar cautery.  Monopolar cautery used to attain hemostasis. Further blunt dissection was completed to create a myocutaneous flap in a preperiosteal plane along the zygoma laterally to the inferior aspect of the zygoma and medially to the foramen of the infraorbital nerve.  The lower eyelid was draped over the lateral rim, and the lateral excess tissue was excised full thickness in a transverse orientation with sandra scissors. A 4-0 Vicryl on a P-2 needle was placed through the anterior-inferior tarsus and exited the tarsus posteriorly superiorly. The 4-0 vicryl was then used to approximate the tarsal strip to the periosteum near the level of the superior odalis of the lateral canthal tendon, allowing good apposition of the lower eyelid to the globe. It was temporarily tied. The height and contour of the tarsal strip was adjusted accordingly. Once the proper position was obtained, the 4-0 vicryl suture was passed back through the remnants of the superior odalis and tied permanently. Attention was then directed to the midface. Two anchoring sutures with 4-0 Vicryl on a P-2 needle were placed through the periosteum of the zygoma above the lateral canthus. The midface was then sutured in an upward and lateral vector to allow for appropriate positioning of the midface.  The gray lines of the lateral upper and lower eyelids were reapposed using 6-0 vicryl on S-14 needle.  The lateral canthal incision was closed with a running 6-0 Prolene on a P-1 needle.      The procedure was repeated for the left side and adjusted as necessary to ensure functional positioning and the best symmetry possible. Erythromycin ointment was applied to the skin wounds and Tobradex drops were instilled in both eyes. The patient tolerated the procedure well without complications. The patient was awoken and taken to the recovery room in stable condition.

## 2020-03-13 NOTE — DISCHARGE INSTRUCTIONS
No swimming, lifting, straining, or bending until sutures are out. Place cool compresses onto lids for 20-30 minutes every 2-3 hours for the first 48 hours, then warm compresses for the next 48 hours. Place erythromycin ointment onto suture line TID and tobradex drops in both eyes QID. Follow up with Dr. Blackmon in 1 week    Follow instruction sheet given to you by Dr Blackmon for post op instruction sheet      Anesthesia: General Anesthesia     You are watched continuously during your procedure by your anesthesia provider.     Youre due to have surgery. During surgery, youll be given medicine called anesthesia or anesthetic. This will keep you comfortable and pain-free. Your anesthesia provider will use general anesthesia.  What is general anesthesia?  General anesthesia puts you into a state like deep sleep. It goes into the bloodstream (IV anesthetics), into the lungs (gas anesthetics), or both. You feel nothing during the procedure. You will not remember it. During the procedure, the anesthesia provider monitors you continuously. He or she checks your heart rate and rhythm, blood pressure, breathing, and blood oxygen.  · IV anesthetics. IV anesthetics are given through an IV line in your arm. Theyre often given first. This is so you are asleep before a gas anesthetic is started. Some kinds of IV anesthetics relieve pain. Others relax you. Your doctor will decide which kind is best in your case.  · Gas anesthetics. Gas anesthetics are breathed into the lungs. They are often used to keep you asleep. They can be given through a facemask or a tube placed in your larynx or trachea (breathing tube).  ¨ If you have a facemask, your anesthesia provider will most likely place it over your nose and mouth while youre still awake. Youll breathe oxygen through the mask as your IV anesthetic is started. Gas anesthetic may be added through the mask.  ¨ If you have a tube in the larynx or trachea, it will be inserted into your  throat after youre asleep.  Anesthesia tools and medicines  You will likely have:  · IV anesthetics. These are put into an IV line into your bloodstream.  · Gas anesthetics. You breathe these anesthetics into your lungs, where they pass into your bloodstream.  · Pulse oximeter. This is a small clip that is attached to the end of your finger. This measures your blood oxygen level.  · Electrocardiography leads (electrodes). These are small sticky pads that are placed on your chest. They record your heart rate and rhythm.  · Blood pressure cuff. This reads your blood pressure.  Risks and possible complications  General anesthesia has some risks. These include:  · Breathing problems  · Nausea and vomiting  · Sore throat or hoarseness (usually temporary)  · Allergic reaction to the anesthetic  · Irregular heartbeat (rare)  · Cardiac arrest (rare)   Anesthesia safety  · Follow all instructions you are given for how long not to eat or drink before your procedure.  · Be sure your doctor knows what medicines and drugs you take. This includes over-the-counter medicines, herbs, supplements, alcohol or other drugs. You will be asked when those were last taken.  · Have an adult family member or friend drive you home after the procedure.  · For the first 24 hours after your surgery:  ¨ Do not drive or use heavy equipment.  ¨ Do not make important decisions or sign legal documents. If important decisions or signing legal documents is necessary during the first 24 hours after surgery, have a trusted family member or spouse act on your behalf.  ¨ Avoid alcohol.  ¨ Have a responsible adult stay with you. He or she can watch for problems and help keep you safe.  Date Last Reviewed: 12/1/2016 © 2000-2017 [employer]. [address] [place] [address], [place] [address]. All rights reserved. This information is not intended as a substitute for professional medical care. Always follow your healthcare professional's  instructions.

## 2020-03-13 NOTE — TRANSFER OF CARE
"Anesthesia Transfer of Care Note    Patient: Willie Liao    Procedure(s) Performed: Procedure(s) (LRB):  REPAIR, ECTROPION, EYELID FLAPS - (Skin and/or Deep Tissue  (Bilateral)    Anesthesia Type: general    Transport from OR: Transported from OR on 2-3 L/min O2 by NC with adequate spontaneous ventilation    Post pain: adequate analgesia    Post assessment: no apparent anesthetic complications and tolerated procedure well    Post vital signs: stable    Level of consciousness: awake and alert    Nausea/Vomiting: no nausea/vomiting    Complications: none    Transfer of care protocol was followed      Last vitals:   Visit Vitals  BP (!) 158/77 (BP Location: Left arm, Patient Position: Lying)   Pulse 75   Temp 36.4 °C (97.6 °F) (Oral)   Resp 20   Ht 5' 10" (1.778 m)   Wt 84.8 kg (187 lb)   SpO2 97%   BMI 26.83 kg/m²     "

## 2020-03-13 NOTE — ANESTHESIA POSTPROCEDURE EVALUATION
Anesthesia Post Evaluation    Patient: Willie Liao    Procedure(s) Performed: Procedure(s) (LRB):  REPAIR, ECTROPION, EYELID FLAPS - (Skin and/or Deep Tissue  (Bilateral)    Final Anesthesia Type: general    Patient location during evaluation: PACU  Patient participation: Yes- Able to Participate  Level of consciousness: awake and alert  Post-procedure vital signs: reviewed and stable  Pain management: adequate  Airway patency: patent  RYAN mitigation strategies: Extubation while patient is awake  PONV status at discharge: No PONV  Anesthetic complications: no      Cardiovascular status: stable  Respiratory status: unassisted and room air  Hydration status: euvolemic  Follow-up not needed.          Vitals Value Taken Time   /89 3/13/2020  2:47 PM   Temp 36.6 °C (97.9 °F) 3/13/2020  2:30 PM   Pulse 76 3/13/2020  2:51 PM   Resp 15 3/13/2020  2:51 PM   SpO2 97 % 3/13/2020  2:51 PM   Vitals shown include unvalidated device data.      No case tracking events are documented in the log.      Pain/Leonard Score: Pain Rating Prior to Med Admin: 6 (3/13/2020  2:16 PM)  Pain Rating Post Med Admin: 4 (3/13/2020  2:55 PM)  Leonard Score: 10 (3/13/2020  2:00 PM)

## 2020-03-13 NOTE — DISCHARGE SUMMARY
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    Hospital Course: Stable    Consults: None    Significant Diagnostic Studies: None    Disposition: Home    Patient Instructions:   - Resume same diet as prior to surgery  - Limit activity, no heavy lifting  - Call MD for severe uncontrolled pain  - Follow-up with ophthalmology as instructed    Patient Instructions:   Current Discharge Medication List      CONTINUE these medications which have NOT CHANGED    Details   atorvastatin (LIPITOR) 20 MG tablet Take 1 tablet (20 mg total) by mouth once daily.  Qty: 90 tablet, Refills: 3      CALCIUM ORAL Take 1 tablet by mouth once daily.      CYANOCOBALAMIN, VITAMIN B-12, (VITAMIN B-12 ORAL) Take 1 tablet by mouth once daily.      FLUZONE HIGH-DOSE 2019-20, PF, 180 mcg/0.5 mL Syrg ADM 0.5ML IM UTD  Refills: 0      glucosam/chond-msm1/C/angely/bor (GLUCOSAMINE-CHOND-MSM COMPLEX ORAL) Take 1 tablet by mouth once daily.      MULTIVITAMIN WITH MINERALS/LUT (MULTIVITAMIN 50 PLUS ORAL) Take 1 tablet by mouth once daily.       OMEGA-3 FATTY ACIDS/FISH OIL (OMEGA 3 FISH OIL ORAL) Take 1 capsule by mouth once daily.      pantoprazole (PROTONIX) 40 MG tablet Take 40 mg by mouth once daily.      vitamin A 13563 UNIT capsule Take 10,000 Units by mouth once daily.      VITAMIN C 500 MG tablet       VITAMIN E ACETATE ORAL Take 1 capsule  by mouth once daily.             No discharge procedures on file.

## 2020-03-13 NOTE — BRIEF OP NOTE
Operative Note  Ophthalmology Service     Date of Procedure:  3/13/2020    Attending: Sarah Blackmon MD     Resident: Peri Galeana MD     Procedures:  1. Bilateral tarsal strip (75857)  2. 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 Patient understands the risk of pain, bleeding, infection, ocular injury, loss of the eye, asymmetry, need for revision in future, scarring.      Procedure in detail:  Informed consent was obtained in the preoperative area. The patient was brought to the operating room. After adequate anesthesia was achieved, approximately 8 cc of subcutaneous lidocaine 2% with epinephrine 2:100,000 was injected near the orbital rims and the lateral aspects of the upper and lower eyelids of both eyes. The full face was prepped and draped in the usual ophthalmic fashion using topical Betadine.     Attention was then directed to the right eye. A lateral canthal incision was performed using a #15 scalpel. Monopolar cautery was used for hemostasis.  Blunt dissection to the orbital rim was performed with Qtips and monopolar cautery. Ro scissors were used to perform a canthotomy.  An inferior cantholysis was performed with Ro scissors and monopolar cautery.  Monopolar cautery used to attain hemostasis. Further blunt  dissection was completed to create a myocutaneous flap in a preperiosteal plane along the zygoma laterally to the inferior aspect of the zygoma and medially to the foramen of the infraorbital nerve.  The lower eyelid was draped over the lateral rim, and the lateral excess tissue was excised full thickness in a transverse orientation with sandra scissors. A 5-0 Vicryl on a P-2 needle was placed through the anterior-inferior tarsus and exited the tarsus posteriorly superiorly. The 5-0 vicryl was then used to approximate the tarsal strip to the periosteum near the level of the superior odalis of the lateral canthal tendon, allowing good apposition of the lower eyelid to the globe. It was temporarily tied. The height and contour of the tarsal strip was adjusted accordingly. Once the proper position was obtained, the 5-0 vicryl suture was passed back through the remnants of the superior odalis and tied permanently. Attention was then directed to the midface. Two anchoring sutures with 5-0 Vicryl on a P-2 needle were placed through the periosteum of the zygoma above the lateral canthus. The midface was then sutured in an upperward and lateral vector to allow for appropriate positioning of the midface.  The gray lines of the lateral upper and lower eyelids were reapposed using 6-0 vicryl on S-14 needle.  The lateral canthal incision was closed with a running 6-0 Prolene on a P-1 needle.      The procedure was repeated for the left side and adjusted as necessary to ensure functional positioning and the best symmetry possible. Erythromycin ointment was applied to the skin wounds and Tobradex drops were instilled in both eyes. The patient tolerated the procedure well without complications. The patient was awoken and taken to the recovery room in stable condition.

## 2020-03-13 NOTE — ANESTHESIA PREPROCEDURE EVALUATION
03/13/2020  Willie Liao is a 79 y.o., male c Hx/o NICOLE of <50% managed c ASA currently, HLD, and GERD. Appropriately NPO. No issues with anesthesia previously.     Hx/o negative stress test in 2017.        Anesthesia Evaluation         Review of Systems         Anesthesia Plan  Type of Anesthesia, risks & benefits discussed:  Anesthesia Type:  MAC  Patient's Preference:   Intra-op Monitoring Plan: standard ASA monitors  Intra-op Monitoring Plan Comments:   Post Op Pain Control Plan: multimodal analgesia, IV/PO Opioids PRN and per primary service following discharge from PACU  Post Op Pain Control Plan Comments:   Induction:   IV  Beta Blocker:  Patient is not currently on a Beta-Blocker (No further documentation required).       Informed Consent: Patient representative understands risks and agrees with Anesthesia plan.  Questions answered. Anesthesia consent signed with patient representative.  ASA Score: 2     Day of Surgery Review of History & Physical: I have interviewed and examined the patient. I have reviewed the patient's H&P dated:  There are no significant changes.  H&P update referred to the surgeon.     Anesthesia Plan Notes: Alirio Ruffin MD  Pediatric Anesthesiology   [facility]           Ready For Surgery From Anesthesia Perspective.

## 2020-03-19 ENCOUNTER — OFFICE VISIT (OUTPATIENT)
Dept: OPHTHALMOLOGY | Facility: CLINIC | Age: 80
End: 2020-03-19
Payer: MEDICARE

## 2020-03-19 DIAGNOSIS — H02.125 MECHANICAL ECTROPION OF LOWER EYELIDS OF BOTH EYES: ICD-10-CM

## 2020-03-19 DIAGNOSIS — Z98.890 POST-OPERATIVE STATE: Primary | ICD-10-CM

## 2020-03-19 DIAGNOSIS — H02.055 TRICHIASIS WITHOUT ENTROPION LEFT LOWER EYELID: ICD-10-CM

## 2020-03-19 DIAGNOSIS — H25.13 NUCLEAR SCLEROSIS OF BOTH EYES: ICD-10-CM

## 2020-03-19 DIAGNOSIS — H02.122 MECHANICAL ECTROPION OF LOWER EYELIDS OF BOTH EYES: ICD-10-CM

## 2020-03-19 DIAGNOSIS — H02.409 FACIAL PTOSIS: ICD-10-CM

## 2020-03-19 DIAGNOSIS — H40.013 OPEN ANGLE WITH BORDERLINE FINDINGS OF BOTH EYES: ICD-10-CM

## 2020-03-19 PROCEDURE — 99024 PR POST-OP FOLLOW-UP VISIT: ICD-10-PCS | Mod: S$GLB,,, | Performed by: OPHTHALMOLOGY

## 2020-03-19 PROCEDURE — 99999 PR PBB SHADOW E&M-EST. PATIENT-LVL II: ICD-10-PCS | Mod: PBBFAC,,, | Performed by: OPHTHALMOLOGY

## 2020-03-19 PROCEDURE — 99999 PR PBB SHADOW E&M-EST. PATIENT-LVL II: CPT | Mod: PBBFAC,,, | Performed by: OPHTHALMOLOGY

## 2020-03-19 PROCEDURE — 99024 POSTOP FOLLOW-UP VISIT: CPT | Mod: S$GLB,,, | Performed by: OPHTHALMOLOGY

## 2020-03-19 NOTE — PROGRESS NOTES
HPI     Pt here for post-op. Bilateral tarsal strip, bilateral mid-face lift with   suture removal bilateral   Date of surgery: 03/13/2020  Oral antibiotics: none   Eye meds: none    Patient reports that everything is doing well, no complaints at this time.            Last edited by Malcolm Merchant on 3/19/2020  9:42 AM. (History)            Assessment /Plan     For exam results, see Encounter Report.    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 Post-operative instructions reviewed. Sutures removed. All questions answered.  Return in 5 weeks prn sooner any worsening of vision/symptoms or any concerns.

## 2020-04-09 ENCOUNTER — TELEPHONE (OUTPATIENT)
Dept: OPHTHALMOLOGY | Facility: CLINIC | Age: 80
End: 2020-04-09

## 2020-04-09 NOTE — TELEPHONE ENCOUNTER
----- Message from Jeff Gtz sent at 4/9/2020  2:29 PM CDT -----  Contact: patient  Type: Needs Medical Advice  Who Called:  patient  Symptoms (please be specific):    How long has patient had these symptoms:    Pharmacy name and phone #:    Best Call Back Number:   Additional Information: requesting a call back have questions?   Akil

## 2020-05-25 ENCOUNTER — OFFICE VISIT (OUTPATIENT)
Dept: FAMILY MEDICINE | Facility: CLINIC | Age: 80
End: 2020-05-25
Payer: MEDICARE

## 2020-05-25 ENCOUNTER — TELEPHONE (OUTPATIENT)
Dept: FAMILY MEDICINE | Facility: CLINIC | Age: 80
End: 2020-05-25

## 2020-05-25 VITALS
WEIGHT: 184.5 LBS | HEART RATE: 78 BPM | SYSTOLIC BLOOD PRESSURE: 128 MMHG | OXYGEN SATURATION: 95 % | DIASTOLIC BLOOD PRESSURE: 70 MMHG | BODY MASS INDEX: 26.48 KG/M2 | TEMPERATURE: 98 F

## 2020-05-25 DIAGNOSIS — Z01.818 PRE-OP EVALUATION: Primary | ICD-10-CM

## 2020-05-25 PROCEDURE — 1126F PR PAIN SEVERITY QUANTIFIED, NO PAIN PRESENT: ICD-10-PCS | Mod: S$GLB,,, | Performed by: PHYSICIAN ASSISTANT

## 2020-05-25 PROCEDURE — 99999 PR PBB SHADOW E&M-EST. PATIENT-LVL IV: ICD-10-PCS | Mod: PBBFAC,,, | Performed by: PHYSICIAN ASSISTANT

## 2020-05-25 PROCEDURE — 99214 OFFICE O/P EST MOD 30 MIN: CPT | Mod: S$GLB,,, | Performed by: PHYSICIAN ASSISTANT

## 2020-05-25 PROCEDURE — 1159F MED LIST DOCD IN RCRD: CPT | Mod: S$GLB,,, | Performed by: PHYSICIAN ASSISTANT

## 2020-05-25 PROCEDURE — 1101F PT FALLS ASSESS-DOCD LE1/YR: CPT | Mod: S$GLB,,, | Performed by: PHYSICIAN ASSISTANT

## 2020-05-25 PROCEDURE — 99999 PR PBB SHADOW E&M-EST. PATIENT-LVL IV: CPT | Mod: PBBFAC,,, | Performed by: PHYSICIAN ASSISTANT

## 2020-05-25 PROCEDURE — 1126F AMNT PAIN NOTED NONE PRSNT: CPT | Mod: S$GLB,,, | Performed by: PHYSICIAN ASSISTANT

## 2020-05-25 PROCEDURE — 99214 PR OFFICE/OUTPT VISIT, EST, LEVL IV, 30-39 MIN: ICD-10-PCS | Mod: S$GLB,,, | Performed by: PHYSICIAN ASSISTANT

## 2020-05-25 PROCEDURE — 1159F PR MEDICATION LIST DOCUMENTED IN MEDICAL RECORD: ICD-10-PCS | Mod: S$GLB,,, | Performed by: PHYSICIAN ASSISTANT

## 2020-05-25 PROCEDURE — 1101F PR PT FALLS ASSESS DOC 0-1 FALLS W/OUT INJ PAST YR: ICD-10-PCS | Mod: S$GLB,,, | Performed by: PHYSICIAN ASSISTANT

## 2020-05-25 RX ORDER — POLYMYXIN B SULFATE AND TRIMETHOPRIM 1; 10000 MG/ML; [USP'U]/ML
SOLUTION OPHTHALMIC
COMMUNITY
Start: 2020-05-19 | End: 2020-10-29

## 2020-05-25 RX ORDER — PREDNISOLONE ACETATE 10 MG/ML
SUSPENSION/ DROPS OPHTHALMIC
COMMUNITY
Start: 2020-05-19 | End: 2020-10-29

## 2020-05-25 NOTE — PROGRESS NOTES
Subjective:       Patient ID: Willie Liao is a 79 y.o. male.    Chief Complaint: Pre-op Exam (cataract)    HPI   Pre op clearance  Cataract surgery Dr. ERIN Handley scheduled 6-1-2020  Pt. Is well  No med complaints  Pt. Had previous clearance Feb. 2020  Labs and EKG normal  Review of Systems   Constitutional: Negative.  Negative for activity change, appetite change, chills, diaphoresis, fatigue, fever and unexpected weight change.   HENT: Negative.    Eyes: Negative.    Respiratory: Negative.  Negative for cough and shortness of breath.    Cardiovascular: Negative.  Negative for chest pain and leg swelling.   Gastrointestinal: Negative.    Endocrine: Negative.    Genitourinary: Negative.    Musculoskeletal: Negative.    Skin: Negative.  Negative for rash.   Neurological: Negative.        Objective:      Physical Exam   Constitutional: He is oriented to person, place, and time. He appears well-developed and well-nourished. No distress.   HENT:   Head: Normocephalic and atraumatic.   Right Ear: External ear normal.   Left Ear: External ear normal.   Nose: Nose normal.   Mouth/Throat: Oropharynx is clear and moist. No oropharyngeal exudate.   Eyes: Conjunctivae are normal. No scleral icterus.   Neck: Normal range of motion. Neck supple. No tracheal deviation present. No thyromegaly present.   Carotids clear   Cardiovascular: Normal rate, regular rhythm, normal heart sounds and intact distal pulses. Exam reveals no gallop and no friction rub.   No murmur heard.  Pulmonary/Chest: Effort normal and breath sounds normal. No stridor. No respiratory distress. He has no wheezes. He has no rales.   Abdominal: Soft. Bowel sounds are normal. He exhibits no distension and no mass. There is no tenderness. There is no rebound and no guarding. No hernia.   No organomegaly   Musculoskeletal: He exhibits no edema.   Lymphadenopathy:     He has no cervical adenopathy.   Neurological: He is alert and oriented to person, place, and  time.   Skin: Skin is warm and dry. No rash noted.   Psychiatric: He has a normal mood and affect. His behavior is normal. Judgment and thought content normal.   Vitals reviewed.      Assessment:       1. Pre-op evaluation        Plan:       Willie was seen today for pre-op exam.    Diagnoses and all orders for this visit:    Pre-op evaluation    pt. Is cleared for cataract surgery and anesthesia

## 2020-06-18 ENCOUNTER — OFFICE VISIT (OUTPATIENT)
Dept: OPHTHALMOLOGY | Facility: CLINIC | Age: 80
End: 2020-06-18
Payer: MEDICARE

## 2020-06-18 DIAGNOSIS — Z98.890 POST-OPERATIVE STATE: Primary | ICD-10-CM

## 2020-06-18 DIAGNOSIS — H02.59 FLOPPY EYELID SYNDROME OF BOTH EYES: ICD-10-CM

## 2020-06-18 DIAGNOSIS — H02.122 MECHANICAL ECTROPION OF LOWER EYELIDS OF BOTH EYES: ICD-10-CM

## 2020-06-18 DIAGNOSIS — H02.125 MECHANICAL ECTROPION OF LOWER EYELIDS OF BOTH EYES: ICD-10-CM

## 2020-06-18 DIAGNOSIS — Z96.1 BILATERAL PSEUDOPHAKIA: ICD-10-CM

## 2020-06-18 DIAGNOSIS — H40.013 OPEN ANGLE WITH BORDERLINE FINDINGS OF BOTH EYES: ICD-10-CM

## 2020-06-18 DIAGNOSIS — H02.409 FACIAL PTOSIS: ICD-10-CM

## 2020-06-18 PROCEDURE — 92285 EXTERNAL OCULAR PHOTOGRAPHY: CPT | Mod: S$GLB,,, | Performed by: OPHTHALMOLOGY

## 2020-06-18 PROCEDURE — 92285 EXTERNAL PHOTOGRAPHY - OU - BOTH EYES: ICD-10-PCS | Mod: S$GLB,,, | Performed by: OPHTHALMOLOGY

## 2020-06-18 PROCEDURE — 99024 POSTOP FOLLOW-UP VISIT: CPT | Mod: S$GLB,,, | Performed by: OPHTHALMOLOGY

## 2020-06-18 PROCEDURE — 99024 PR POST-OP FOLLOW-UP VISIT: ICD-10-PCS | Mod: S$GLB,,, | Performed by: OPHTHALMOLOGY

## 2020-06-18 NOTE — PROGRESS NOTES
HPI     Pt here for 1 month post-op. Bilateral tarsal strip, bilateral mid-face   lift with suture removal  Date of surgery: 03/13/2020  t states his eyes have been watering a lot lately.  Oral antibiotics: none   Eye meds:     Pred Forte TID OD QID OS  Polytrim OS qid  Systane prn OU    Dr. Handley in [place].                   Last edited by Sarah Blackmon MD on 6/18/2020 11:21 AM. (History)            Assessment /Plan     For exam results, see Encounter Report.    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  The patient feels that he has had some recurrence of tearing.  He has recently had bilateral cataract extraction with Dr. Handley in [place].    On exam, the patient has good orbicularis function bilaterally.  He does not have any lagophthalmos.  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  His posterior chamber intra-ocular lenses are seated in excellent position.     Discussed with the patient and his wife that his tearing has multifactorial causes.  Recommend warm compresses, lid hygiene and artificial tears throughout the day.  Furthermore, explained to the patient that he has floppy eyelid syndrome.  With time, he will likely need further surgical correction of his eyelids as his tissues further loosen up.     Start doxycycline 50 mg p.o. b.i.d. for 1 month with 1 refill given.    Return to see me in 2 months.    Return to Dr. Handley for routine eye care.

## 2020-09-22 DIAGNOSIS — I65.23 BILATERAL CAROTID ARTERY STENOSIS: Primary | ICD-10-CM

## 2020-09-25 ENCOUNTER — HOSPITAL ENCOUNTER (OUTPATIENT)
Dept: RADIOLOGY | Facility: HOSPITAL | Age: 80
Discharge: HOME | End: 2020-09-25
Attending: THORACIC SURGERY (CARDIOTHORACIC VASCULAR SURGERY)
Payer: MEDICARE

## 2020-09-25 DIAGNOSIS — I65.23 BILATERAL CAROTID ARTERY STENOSIS: ICD-10-CM

## 2020-09-25 PROCEDURE — 93880 EXTRACRANIAL BILAT STUDY: CPT | Mod: 26,,, | Performed by: RADIOLOGY

## 2020-09-25 PROCEDURE — 93880 US CAROTID BILATERAL: ICD-10-PCS | Mod: 26,,, | Performed by: RADIOLOGY

## 2020-09-25 PROCEDURE — 93880 EXTRACRANIAL BILAT STUDY: CPT | Mod: TC

## 2020-10-29 ENCOUNTER — OFFICE VISIT (OUTPATIENT)
Dept: FAMILY MEDICINE | Facility: CLINIC | Age: 80
End: 2020-10-29
Payer: MEDICARE

## 2020-10-29 ENCOUNTER — LAB VISIT (OUTPATIENT)
Dept: LAB | Facility: HOSPITAL | Age: 80
End: 2020-10-29
Attending: FAMILY MEDICINE
Payer: MEDICARE

## 2020-10-29 VITALS
WEIGHT: 181.88 LBS | HEART RATE: 81 BPM | DIASTOLIC BLOOD PRESSURE: 60 MMHG | HEIGHT: 70 IN | OXYGEN SATURATION: 94 % | SYSTOLIC BLOOD PRESSURE: 106 MMHG | TEMPERATURE: 97 F | BODY MASS INDEX: 26.04 KG/M2

## 2020-10-29 DIAGNOSIS — Z00.00 HEALTHCARE MAINTENANCE: Primary | ICD-10-CM

## 2020-10-29 DIAGNOSIS — R79.9 ABNORMAL FINDING OF BLOOD CHEMISTRY, UNSPECIFIED: ICD-10-CM

## 2020-10-29 DIAGNOSIS — Z00.00 HEALTHCARE MAINTENANCE: ICD-10-CM

## 2020-10-29 LAB
ALBUMIN SERPL BCP-MCNC: 4 G/DL (ref 3.5–5.2)
ALP SERPL-CCNC: 86 U/L (ref 55–135)
ALT SERPL W/O P-5'-P-CCNC: 23 U/L (ref 10–44)
ANION GAP SERPL CALC-SCNC: 7 MMOL/L (ref 8–16)
AST SERPL-CCNC: 21 U/L (ref 10–40)
BILIRUB SERPL-MCNC: 2.5 MG/DL (ref 0.1–1)
BUN SERPL-MCNC: 15 MG/DL (ref 8–23)
CALCIUM SERPL-MCNC: 9.2 MG/DL (ref 8.7–10.5)
CHLORIDE SERPL-SCNC: 105 MMOL/L (ref 95–110)
CHOLEST SERPL-MCNC: 159 MG/DL (ref 120–199)
CHOLEST/HDLC SERPL: 4.2 {RATIO} (ref 2–5)
CO2 SERPL-SCNC: 28 MMOL/L (ref 23–29)
CREAT SERPL-MCNC: 1 MG/DL (ref 0.5–1.4)
EST. GFR  (AFRICAN AMERICAN): >60 ML/MIN/1.73 M^2
EST. GFR  (NON AFRICAN AMERICAN): >60 ML/MIN/1.73 M^2
ESTIMATED AVG GLUCOSE: 117 MG/DL (ref 68–131)
GLUCOSE SERPL-MCNC: 121 MG/DL (ref 70–110)
HBA1C MFR BLD HPLC: 5.7 % (ref 4–5.6)
HCV AB SERPL QL IA: NEGATIVE
HDLC SERPL-MCNC: 38 MG/DL (ref 40–75)
HDLC SERPL: 23.9 % (ref 20–50)
LDLC SERPL CALC-MCNC: 97.4 MG/DL (ref 63–159)
NONHDLC SERPL-MCNC: 121 MG/DL
POTASSIUM SERPL-SCNC: 4.4 MMOL/L (ref 3.5–5.1)
PROT SERPL-MCNC: 7.1 G/DL (ref 6–8.4)
SODIUM SERPL-SCNC: 140 MMOL/L (ref 136–145)
TRIGL SERPL-MCNC: 118 MG/DL (ref 30–150)

## 2020-10-29 PROCEDURE — 99397 PER PM REEVAL EST PAT 65+ YR: CPT | Mod: S$GLB,,, | Performed by: FAMILY MEDICINE

## 2020-10-29 PROCEDURE — 80061 LIPID PANEL: CPT

## 2020-10-29 PROCEDURE — 99999 PR PBB SHADOW E&M-EST. PATIENT-LVL III: CPT | Mod: PBBFAC,,, | Performed by: FAMILY MEDICINE

## 2020-10-29 PROCEDURE — 99397 PR PREVENTIVE VISIT,EST,65 & OVER: ICD-10-PCS | Mod: S$GLB,,, | Performed by: FAMILY MEDICINE

## 2020-10-29 PROCEDURE — 83036 HEMOGLOBIN GLYCOSYLATED A1C: CPT

## 2020-10-29 PROCEDURE — 99999 PR PBB SHADOW E&M-EST. PATIENT-LVL III: ICD-10-PCS | Mod: PBBFAC,,, | Performed by: FAMILY MEDICINE

## 2020-10-29 PROCEDURE — 86803 HEPATITIS C AB TEST: CPT

## 2020-10-29 PROCEDURE — 36415 COLL VENOUS BLD VENIPUNCTURE: CPT | Mod: PO

## 2020-10-29 PROCEDURE — 80053 COMPREHEN METABOLIC PANEL: CPT

## 2020-10-29 NOTE — PROGRESS NOTES
Subjective:   Patient ID: Willie Liao is a 80 y.o. male     Chief Complaint:Annual Exam      Pt here for checkup.  Doing well.     Review of Systems   Constitutional: Negative for chills and fever.   HENT: Negative for sore throat and trouble swallowing.    Respiratory: Negative for cough and shortness of breath.    Cardiovascular: Negative for chest pain and leg swelling.   Gastrointestinal: Negative for abdominal distention and abdominal pain.   Genitourinary: Negative for dysuria and flank pain.   Musculoskeletal: Negative for arthralgias and back pain.   Skin: Negative for color change and pallor.   Neurological: Negative for weakness and headaches.   Psychiatric/Behavioral: Negative for agitation and confusion.     Past Medical History:   Diagnosis Date    Carotid stenosis     Gastritis     GERD (gastroesophageal reflux disease)     H. pylori infection     Hyperlipidemia     Shingles      Past Surgical History:   Procedure Laterality Date    arthroscope Left     knee arthroscopy, torn meniscus    COLONOSCOPY      Adrien    ECTROPION REPAIR Bilateral 3/13/2020    Procedure: REPAIR, ECTROPION, EYELID FLAPS - (Skin and/or Deep Tissue ;  Surgeon: Sarah Blackmon MD;  Location: [facility] OR [address] [place] [address];  Service: Ophthalmology;  Laterality: Bilateral;     Objective:     Vitals:    10/29/20 0826   BP: 106/60   Pulse: 81   Temp: 97.3 °F (36.3 °C)     Body mass index is 26.1 kg/m².  Physical Exam  Vitals signs and nursing note reviewed.   Constitutional:       Appearance: He is well-developed.   HENT:      Head: Normocephalic and atraumatic.   Eyes:      General: No scleral icterus.     Conjunctiva/sclera: Conjunctivae normal.   Neck:      Musculoskeletal: Normal range of motion and neck supple.   Cardiovascular:      Heart sounds: No murmur.   Pulmonary:      Effort: Pulmonary effort is normal. No respiratory distress.   Musculoskeletal: Normal range of motion.         General: No deformity.   Skin:      Coloration: Skin is not pale.      Findings: No rash.   Neurological:      Mental Status: He is alert and oriented to person, place, and time.   Psychiatric:         Behavior: Behavior normal.         Thought Content: Thought content normal.         Judgment: Judgment normal.       Assessment:     1. Healthcare maintenance    2. Abnormal finding of blood chemistry, unspecified       Plan:   Healthcare maintenance  -     Comprehensive Metabolic Panel; Future; Expected date: 01/29/2021  -     Lipid Panel; Future; Expected date: 10/29/2020  -     Hemoglobin A1C; Future; Expected date: 10/29/2020  -     Hepatitis C Antibody; Future; Expected date: 10/29/2020    Abnormal finding of blood chemistry, unspecified   -     Lipid Panel; Future; Expected date: 10/29/2020  -     Hemoglobin A1C; Future; Expected date: 10/29/2020            Cooper Luz MD  10/29/2020    Portions of this note have been dictated with NATHEN Wall

## 2021-01-16 ENCOUNTER — IMMUNIZATION (OUTPATIENT)
Dept: PRIMARY CARE CLINIC | Facility: CLINIC | Age: 81
End: 2021-01-16
Payer: MEDICARE

## 2021-01-16 DIAGNOSIS — Z23 NEED FOR VACCINATION: Primary | ICD-10-CM

## 2021-01-16 PROCEDURE — 91300 COVID-19, MRNA, LNP-S, PF, 30 MCG/0.3 ML DOSE VACCINE: CPT | Mod: PBBFAC | Performed by: EMERGENCY MEDICINE

## 2021-02-06 ENCOUNTER — IMMUNIZATION (OUTPATIENT)
Dept: PRIMARY CARE CLINIC | Facility: CLINIC | Age: 81
End: 2021-02-06
Payer: MEDICARE

## 2021-02-06 DIAGNOSIS — Z23 NEED FOR VACCINATION: Primary | ICD-10-CM

## 2021-02-06 PROCEDURE — 0002A COVID-19, MRNA, LNP-S, PF, 30 MCG/0.3 ML DOSE VACCINE: ICD-10-PCS | Mod: CV19,S$GLB,, | Performed by: FAMILY MEDICINE

## 2021-02-06 PROCEDURE — 0002A COVID-19, MRNA, LNP-S, PF, 30 MCG/0.3 ML DOSE VACCINE: CPT | Mod: CV19,S$GLB,, | Performed by: FAMILY MEDICINE

## 2021-02-06 PROCEDURE — 91300 COVID-19, MRNA, LNP-S, PF, 30 MCG/0.3 ML DOSE VACCINE: CPT | Mod: S$GLB,,, | Performed by: FAMILY MEDICINE

## 2021-02-06 PROCEDURE — 91300 COVID-19, MRNA, LNP-S, PF, 30 MCG/0.3 ML DOSE VACCINE: ICD-10-PCS | Mod: S$GLB,,, | Performed by: FAMILY MEDICINE

## 2021-03-04 ENCOUNTER — TELEPHONE (OUTPATIENT)
Dept: OPHTHALMOLOGY | Facility: CLINIC | Age: 81
End: 2021-03-04

## 2021-03-04 ENCOUNTER — OFFICE VISIT (OUTPATIENT)
Dept: OPHTHALMOLOGY | Facility: CLINIC | Age: 81
End: 2021-03-04
Payer: MEDICARE

## 2021-03-04 DIAGNOSIS — H02.132 SENILE ECTROPION OF RIGHT LOWER EYELID: ICD-10-CM

## 2021-03-04 DIAGNOSIS — H02.115 CICATRICIAL ECTROPION OF LEFT LOWER EYELID: ICD-10-CM

## 2021-03-04 DIAGNOSIS — Z13.9 SCREENING FOR UNSPECIFIED CONDITION: Primary | ICD-10-CM

## 2021-03-04 DIAGNOSIS — Z98.890 POST-OPERATIVE STATE: ICD-10-CM

## 2021-03-04 DIAGNOSIS — H02.59 FLOPPY EYELID SYNDROME OF BOTH EYES: Primary | ICD-10-CM

## 2021-03-04 PROCEDURE — 99214 OFFICE O/P EST MOD 30 MIN: CPT | Mod: S$GLB,,, | Performed by: OPHTHALMOLOGY

## 2021-03-04 PROCEDURE — 92285 EXTERNAL OCULAR PHOTOGRAPHY: CPT | Mod: PBBFAC | Performed by: OPHTHALMOLOGY

## 2021-03-04 PROCEDURE — 99213 OFFICE O/P EST LOW 20 MIN: CPT | Mod: PBBFAC,25 | Performed by: OPHTHALMOLOGY

## 2021-03-04 PROCEDURE — 99214 PR OFFICE/OUTPT VISIT, EST, LEVL IV, 30-39 MIN: ICD-10-PCS | Mod: S$GLB,,, | Performed by: OPHTHALMOLOGY

## 2021-03-04 PROCEDURE — 99999 PR PBB SHADOW E&M-EST. PATIENT-LVL III: CPT | Mod: PBBFAC,,, | Performed by: OPHTHALMOLOGY

## 2021-03-04 PROCEDURE — 99999 PR PBB SHADOW E&M-EST. PATIENT-LVL III: ICD-10-PCS | Mod: PBBFAC,,, | Performed by: OPHTHALMOLOGY

## 2021-03-04 PROCEDURE — 92285 EXTERNAL PHOTOGRAPHY - OU - BOTH EYES: ICD-10-PCS | Mod: S$GLB,,, | Performed by: OPHTHALMOLOGY

## 2021-03-25 ENCOUNTER — TELEPHONE (OUTPATIENT)
Dept: OPHTHALMOLOGY | Facility: CLINIC | Age: 81
End: 2021-03-25

## 2021-04-30 ENCOUNTER — LAB VISIT (OUTPATIENT)
Dept: PRIMARY CARE CLINIC | Facility: CLINIC | Age: 81
End: 2021-04-30
Payer: MEDICARE

## 2021-04-30 DIAGNOSIS — Z13.9 SCREENING FOR UNSPECIFIED CONDITION: ICD-10-CM

## 2021-04-30 PROCEDURE — U0005 INFEC AGEN DETEC AMPLI PROBE: HCPCS | Performed by: OPHTHALMOLOGY

## 2021-04-30 PROCEDURE — U0003 INFECTIOUS AGENT DETECTION BY NUCLEIC ACID (DNA OR RNA); SEVERE ACUTE RESPIRATORY SYNDROME CORONAVIRUS 2 (SARS-COV-2) (CORONAVIRUS DISEASE [COVID-19]), AMPLIFIED PROBE TECHNIQUE, MAKING USE OF HIGH THROUGHPUT TECHNOLOGIES AS DESCRIBED BY CMS-2020-01-R: HCPCS | Performed by: OPHTHALMOLOGY

## 2021-05-01 LAB — SARS-COV-2 RNA RESP QL NAA+PROBE: NOT DETECTED

## 2021-05-03 ENCOUNTER — PROCEDURE VISIT (OUTPATIENT)
Dept: OPHTHALMOLOGY | Facility: CLINIC | Age: 81
End: 2021-05-03
Payer: MEDICARE

## 2021-05-03 VITALS — SYSTOLIC BLOOD PRESSURE: 137 MMHG | DIASTOLIC BLOOD PRESSURE: 82 MMHG | HEART RATE: 72 BPM

## 2021-05-03 DIAGNOSIS — Z96.1 BILATERAL PSEUDOPHAKIA: ICD-10-CM

## 2021-05-03 DIAGNOSIS — H02.111: Primary | ICD-10-CM

## 2021-05-03 DIAGNOSIS — H40.013 OPEN ANGLE WITH BORDERLINE FINDINGS OF BOTH EYES: ICD-10-CM

## 2021-05-03 DIAGNOSIS — H02.132 SENILE ECTROPION OF RIGHT LOWER EYELID: ICD-10-CM

## 2021-05-03 DIAGNOSIS — H02.115 CICATRICIAL ECTROPION OF LEFT LOWER EYELID: ICD-10-CM

## 2021-05-03 DIAGNOSIS — H02.59 FLOPPY EYELID SYNDROME OF BOTH EYES: ICD-10-CM

## 2021-05-03 PROCEDURE — 68840 EXPLORE/IRRIGATE TEAR DUCTS: CPT | Mod: 50,S$GLB,, | Performed by: OPHTHALMOLOGY

## 2021-05-03 PROCEDURE — 99499 UNLISTED E&M SERVICE: CPT | Mod: S$GLB,,, | Performed by: OPHTHALMOLOGY

## 2021-05-03 PROCEDURE — 68840 PR EXPLORE LACRIMAL CANALICULI: ICD-10-PCS | Mod: 50,S$GLB,, | Performed by: OPHTHALMOLOGY

## 2021-05-03 PROCEDURE — 99499 NO LOS: ICD-10-PCS | Mod: S$GLB,,, | Performed by: OPHTHALMOLOGY

## 2021-05-03 PROCEDURE — 68840 EXPLORE/IRRIGATE TEAR DUCTS: CPT | Mod: 50,PBBFAC | Performed by: OPHTHALMOLOGY

## 2021-06-24 ENCOUNTER — TELEPHONE (OUTPATIENT)
Dept: OPHTHALMOLOGY | Facility: CLINIC | Age: 81
End: 2021-06-24

## 2021-06-29 ENCOUNTER — OFFICE VISIT (OUTPATIENT)
Dept: DERMATOLOGY | Facility: CLINIC | Age: 81
End: 2021-06-29
Payer: MEDICARE

## 2021-06-29 DIAGNOSIS — L81.4 SOLAR LENTIGO: ICD-10-CM

## 2021-06-29 DIAGNOSIS — L57.0 ACTINIC KERATOSES: Primary | ICD-10-CM

## 2021-06-29 DIAGNOSIS — L82.1 SEBORRHEIC KERATOSES: ICD-10-CM

## 2021-06-29 DIAGNOSIS — D18.01 CHERRY ANGIOMA: ICD-10-CM

## 2021-06-29 DIAGNOSIS — D22.9 MULTIPLE BENIGN NEVI: ICD-10-CM

## 2021-06-29 PROCEDURE — 17003 DESTRUCT PREMALG LES 2-14: CPT | Mod: S$GLB,,, | Performed by: DERMATOLOGY

## 2021-06-29 PROCEDURE — 17000 DESTRUCT PREMALG LESION: CPT | Mod: S$GLB,,, | Performed by: DERMATOLOGY

## 2021-06-29 PROCEDURE — 99999 PR PBB SHADOW E&M-EST. PATIENT-LVL III: CPT | Mod: PBBFAC,,, | Performed by: DERMATOLOGY

## 2021-06-29 PROCEDURE — 3288F PR FALLS RISK ASSESSMENT DOCUMENTED: ICD-10-PCS | Mod: S$GLB,,, | Performed by: DERMATOLOGY

## 2021-06-29 PROCEDURE — 17003 DESTRUCTION, PREMALIGNANT LESIONS; SECOND THROUGH 14 LESIONS: ICD-10-PCS | Mod: S$GLB,,, | Performed by: DERMATOLOGY

## 2021-06-29 PROCEDURE — 1159F MED LIST DOCD IN RCRD: CPT | Mod: S$GLB,,, | Performed by: DERMATOLOGY

## 2021-06-29 PROCEDURE — 3288F FALL RISK ASSESSMENT DOCD: CPT | Mod: S$GLB,,, | Performed by: DERMATOLOGY

## 2021-06-29 PROCEDURE — 1101F PR PT FALLS ASSESS DOC 0-1 FALLS W/OUT INJ PAST YR: ICD-10-PCS | Mod: S$GLB,,, | Performed by: DERMATOLOGY

## 2021-06-29 PROCEDURE — 1159F PR MEDICATION LIST DOCUMENTED IN MEDICAL RECORD: ICD-10-PCS | Mod: S$GLB,,, | Performed by: DERMATOLOGY

## 2021-06-29 PROCEDURE — 99213 PR OFFICE/OUTPT VISIT, EST, LEVL III, 20-29 MIN: ICD-10-PCS | Mod: 25,S$GLB,, | Performed by: DERMATOLOGY

## 2021-06-29 PROCEDURE — 99213 OFFICE O/P EST LOW 20 MIN: CPT | Mod: 25,S$GLB,, | Performed by: DERMATOLOGY

## 2021-06-29 PROCEDURE — 1101F PT FALLS ASSESS-DOCD LE1/YR: CPT | Mod: S$GLB,,, | Performed by: DERMATOLOGY

## 2021-06-29 PROCEDURE — 17000 PR DESTRUCTION(LASER SURGERY,CRYOSURGERY,CHEMOSURGERY),PREMALIGNANT LESIONS,FIRST LESION: ICD-10-PCS | Mod: S$GLB,,, | Performed by: DERMATOLOGY

## 2021-06-29 PROCEDURE — 99999 PR PBB SHADOW E&M-EST. PATIENT-LVL III: ICD-10-PCS | Mod: PBBFAC,,, | Performed by: DERMATOLOGY

## 2021-09-13 RX ORDER — ATORVASTATIN CALCIUM 20 MG/1
20 TABLET, FILM COATED ORAL DAILY
Qty: 90 TABLET | Refills: 3 | Status: SHIPPED | OUTPATIENT
Start: 2021-09-13 | End: 2022-09-10

## 2021-11-11 ENCOUNTER — OFFICE VISIT (OUTPATIENT)
Dept: FAMILY MEDICINE | Facility: CLINIC | Age: 81
End: 2021-11-11
Payer: MEDICARE

## 2021-11-11 VITALS
SYSTOLIC BLOOD PRESSURE: 122 MMHG | HEART RATE: 84 BPM | DIASTOLIC BLOOD PRESSURE: 70 MMHG | WEIGHT: 184.94 LBS | HEIGHT: 70 IN | OXYGEN SATURATION: 95 % | TEMPERATURE: 98 F | BODY MASS INDEX: 26.48 KG/M2

## 2021-11-11 DIAGNOSIS — I73.9 PERIPHERAL VASCULAR DISEASE, UNSPECIFIED: ICD-10-CM

## 2021-11-11 DIAGNOSIS — D69.2 OTHER NONTHROMBOCYTOPENIC PURPURA: ICD-10-CM

## 2021-11-11 DIAGNOSIS — R06.09 DYSPNEA ON EXERTION: ICD-10-CM

## 2021-11-11 DIAGNOSIS — Z00.00 HEALTHCARE MAINTENANCE: Primary | ICD-10-CM

## 2021-11-11 DIAGNOSIS — R79.9 ABNORMAL BLOOD FINDING: ICD-10-CM

## 2021-11-11 DIAGNOSIS — R06.02 SHORTNESS OF BREATH: ICD-10-CM

## 2021-11-11 PROCEDURE — 1101F PT FALLS ASSESS-DOCD LE1/YR: CPT | Mod: S$GLB,,, | Performed by: FAMILY MEDICINE

## 2021-11-11 PROCEDURE — 1159F PR MEDICATION LIST DOCUMENTED IN MEDICAL RECORD: ICD-10-PCS | Mod: S$GLB,,, | Performed by: FAMILY MEDICINE

## 2021-11-11 PROCEDURE — 3074F SYST BP LT 130 MM HG: CPT | Mod: S$GLB,,, | Performed by: FAMILY MEDICINE

## 2021-11-11 PROCEDURE — 3074F PR MOST RECENT SYSTOLIC BLOOD PRESSURE < 130 MM HG: ICD-10-PCS | Mod: S$GLB,,, | Performed by: FAMILY MEDICINE

## 2021-11-11 PROCEDURE — 99214 PR OFFICE/OUTPT VISIT, EST, LEVL IV, 30-39 MIN: ICD-10-PCS | Mod: S$GLB,,, | Performed by: FAMILY MEDICINE

## 2021-11-11 PROCEDURE — 1126F AMNT PAIN NOTED NONE PRSNT: CPT | Mod: S$GLB,,, | Performed by: FAMILY MEDICINE

## 2021-11-11 PROCEDURE — 1126F PR PAIN SEVERITY QUANTIFIED, NO PAIN PRESENT: ICD-10-PCS | Mod: S$GLB,,, | Performed by: FAMILY MEDICINE

## 2021-11-11 PROCEDURE — 1101F PR PT FALLS ASSESS DOC 0-1 FALLS W/OUT INJ PAST YR: ICD-10-PCS | Mod: S$GLB,,, | Performed by: FAMILY MEDICINE

## 2021-11-11 PROCEDURE — 3288F PR FALLS RISK ASSESSMENT DOCUMENTED: ICD-10-PCS | Mod: S$GLB,,, | Performed by: FAMILY MEDICINE

## 2021-11-11 PROCEDURE — 1159F MED LIST DOCD IN RCRD: CPT | Mod: S$GLB,,, | Performed by: FAMILY MEDICINE

## 2021-11-11 PROCEDURE — 99999 PR PBB SHADOW E&M-EST. PATIENT-LVL III: ICD-10-PCS | Mod: PBBFAC,,, | Performed by: FAMILY MEDICINE

## 2021-11-11 PROCEDURE — 99999 PR PBB SHADOW E&M-EST. PATIENT-LVL III: CPT | Mod: PBBFAC,,, | Performed by: FAMILY MEDICINE

## 2021-11-11 PROCEDURE — 3078F DIAST BP <80 MM HG: CPT | Mod: S$GLB,,, | Performed by: FAMILY MEDICINE

## 2021-11-11 PROCEDURE — 3078F PR MOST RECENT DIASTOLIC BLOOD PRESSURE < 80 MM HG: ICD-10-PCS | Mod: S$GLB,,, | Performed by: FAMILY MEDICINE

## 2021-11-11 PROCEDURE — 3288F FALL RISK ASSESSMENT DOCD: CPT | Mod: S$GLB,,, | Performed by: FAMILY MEDICINE

## 2021-11-11 PROCEDURE — 99214 OFFICE O/P EST MOD 30 MIN: CPT | Mod: S$GLB,,, | Performed by: FAMILY MEDICINE

## 2021-11-11 RX ORDER — ZINC GLUCONATE 50 MG
50 TABLET ORAL DAILY
COMMUNITY

## 2021-11-12 PROBLEM — D69.2 OTHER NONTHROMBOCYTOPENIC PURPURA: Status: ACTIVE | Noted: 2021-11-12

## 2021-11-15 ENCOUNTER — TELEPHONE (OUTPATIENT)
Dept: DERMATOLOGY | Facility: CLINIC | Age: 81
End: 2021-11-15
Payer: MEDICARE

## 2021-11-15 ENCOUNTER — LAB VISIT (OUTPATIENT)
Dept: LAB | Facility: HOSPITAL | Age: 81
End: 2021-11-15
Attending: FAMILY MEDICINE
Payer: MEDICARE

## 2021-11-15 DIAGNOSIS — R79.9 ABNORMAL BLOOD FINDING: ICD-10-CM

## 2021-11-15 DIAGNOSIS — Z00.00 HEALTHCARE MAINTENANCE: ICD-10-CM

## 2021-11-15 LAB
ALBUMIN SERPL BCP-MCNC: 3.9 G/DL (ref 3.5–5.2)
ALP SERPL-CCNC: 93 U/L (ref 55–135)
ALT SERPL W/O P-5'-P-CCNC: 25 U/L (ref 10–44)
ANION GAP SERPL CALC-SCNC: 7 MMOL/L (ref 8–16)
AST SERPL-CCNC: 21 U/L (ref 10–40)
BASOPHILS # BLD AUTO: 0.02 K/UL (ref 0–0.2)
BASOPHILS NFR BLD: 0.3 % (ref 0–1.9)
BILIRUB SERPL-MCNC: 2.1 MG/DL (ref 0.1–1)
BUN SERPL-MCNC: 12 MG/DL (ref 8–23)
CALCIUM SERPL-MCNC: 9.2 MG/DL (ref 8.7–10.5)
CHLORIDE SERPL-SCNC: 106 MMOL/L (ref 95–110)
CHOLEST SERPL-MCNC: 157 MG/DL (ref 120–199)
CHOLEST/HDLC SERPL: 3.9 {RATIO} (ref 2–5)
CO2 SERPL-SCNC: 27 MMOL/L (ref 23–29)
CREAT SERPL-MCNC: 0.9 MG/DL (ref 0.5–1.4)
DIFFERENTIAL METHOD: NORMAL
EOSINOPHIL # BLD AUTO: 0.1 K/UL (ref 0–0.5)
EOSINOPHIL NFR BLD: 1.8 % (ref 0–8)
ERYTHROCYTE [DISTWIDTH] IN BLOOD BY AUTOMATED COUNT: 12.4 % (ref 11.5–14.5)
EST. GFR  (AFRICAN AMERICAN): >60 ML/MIN/1.73 M^2
EST. GFR  (NON AFRICAN AMERICAN): >60 ML/MIN/1.73 M^2
GLUCOSE SERPL-MCNC: 123 MG/DL (ref 70–110)
HCT VFR BLD AUTO: 48.1 % (ref 40–54)
HDLC SERPL-MCNC: 40 MG/DL (ref 40–75)
HDLC SERPL: 25.5 % (ref 20–50)
HGB BLD-MCNC: 16 G/DL (ref 14–18)
IMM GRANULOCYTES # BLD AUTO: 0.02 K/UL (ref 0–0.04)
IMM GRANULOCYTES NFR BLD AUTO: 0.3 % (ref 0–0.5)
LDLC SERPL CALC-MCNC: 90.6 MG/DL (ref 63–159)
LYMPHOCYTES # BLD AUTO: 2.4 K/UL (ref 1–4.8)
LYMPHOCYTES NFR BLD: 33.3 % (ref 18–48)
MCH RBC QN AUTO: 31 PG (ref 27–31)
MCHC RBC AUTO-ENTMCNC: 33.3 G/DL (ref 32–36)
MCV RBC AUTO: 93 FL (ref 82–98)
MONOCYTES # BLD AUTO: 0.5 K/UL (ref 0.3–1)
MONOCYTES NFR BLD: 6.6 % (ref 4–15)
NEUTROPHILS # BLD AUTO: 4.2 K/UL (ref 1.8–7.7)
NEUTROPHILS NFR BLD: 57.7 % (ref 38–73)
NONHDLC SERPL-MCNC: 117 MG/DL
NRBC BLD-RTO: 0 /100 WBC
PLATELET # BLD AUTO: 217 K/UL (ref 150–450)
PMV BLD AUTO: 12.2 FL (ref 9.2–12.9)
POTASSIUM SERPL-SCNC: 4.2 MMOL/L (ref 3.5–5.1)
PROT SERPL-MCNC: 7.2 G/DL (ref 6–8.4)
RBC # BLD AUTO: 5.16 M/UL (ref 4.6–6.2)
SODIUM SERPL-SCNC: 140 MMOL/L (ref 136–145)
TRIGL SERPL-MCNC: 132 MG/DL (ref 30–150)
WBC # BLD AUTO: 7.32 K/UL (ref 3.9–12.7)

## 2021-11-15 PROCEDURE — 80053 COMPREHEN METABOLIC PANEL: CPT | Performed by: FAMILY MEDICINE

## 2021-11-15 PROCEDURE — 83036 HEMOGLOBIN GLYCOSYLATED A1C: CPT | Performed by: FAMILY MEDICINE

## 2021-11-15 PROCEDURE — 85025 COMPLETE CBC W/AUTO DIFF WBC: CPT | Performed by: FAMILY MEDICINE

## 2021-11-15 PROCEDURE — 36415 COLL VENOUS BLD VENIPUNCTURE: CPT | Mod: PO | Performed by: FAMILY MEDICINE

## 2021-11-15 PROCEDURE — 80061 LIPID PANEL: CPT | Performed by: FAMILY MEDICINE

## 2021-11-16 LAB
ESTIMATED AVG GLUCOSE: 128 MG/DL (ref 68–131)
HBA1C MFR BLD: 6.1 % (ref 4–5.6)

## 2021-11-18 ENCOUNTER — PATIENT OUTREACH (OUTPATIENT)
Dept: ADMINISTRATIVE | Facility: OTHER | Age: 81
End: 2021-11-18
Payer: MEDICARE

## 2021-11-18 ENCOUNTER — OFFICE VISIT (OUTPATIENT)
Dept: DERMATOLOGY | Facility: CLINIC | Age: 81
End: 2021-11-18
Payer: MEDICARE

## 2021-11-18 VITALS — BODY MASS INDEX: 26.34 KG/M2 | WEIGHT: 184 LBS | HEIGHT: 70 IN

## 2021-11-18 DIAGNOSIS — D22.9 MULTIPLE BENIGN NEVI: ICD-10-CM

## 2021-11-18 DIAGNOSIS — L57.0 ACTINIC KERATOSES: ICD-10-CM

## 2021-11-18 DIAGNOSIS — D48.5 NEOPLASM OF UNCERTAIN BEHAVIOR OF SKIN: Primary | ICD-10-CM

## 2021-11-18 DIAGNOSIS — L82.1 SEBORRHEIC KERATOSES: ICD-10-CM

## 2021-11-18 PROCEDURE — 1101F PT FALLS ASSESS-DOCD LE1/YR: CPT | Mod: S$GLB,,, | Performed by: DERMATOLOGY

## 2021-11-18 PROCEDURE — 1126F AMNT PAIN NOTED NONE PRSNT: CPT | Mod: S$GLB,,, | Performed by: DERMATOLOGY

## 2021-11-18 PROCEDURE — 1160F RVW MEDS BY RX/DR IN RCRD: CPT | Mod: S$GLB,,, | Performed by: DERMATOLOGY

## 2021-11-18 PROCEDURE — 99213 PR OFFICE/OUTPT VISIT, EST, LEVL III, 20-29 MIN: ICD-10-PCS | Mod: 25,S$GLB,, | Performed by: DERMATOLOGY

## 2021-11-18 PROCEDURE — 11102 TANGNTL BX SKIN SINGLE LES: CPT | Mod: S$GLB,,, | Performed by: DERMATOLOGY

## 2021-11-18 PROCEDURE — 1126F PR PAIN SEVERITY QUANTIFIED, NO PAIN PRESENT: ICD-10-PCS | Mod: S$GLB,,, | Performed by: DERMATOLOGY

## 2021-11-18 PROCEDURE — 88305 TISSUE EXAM BY PATHOLOGIST: ICD-10-PCS | Mod: 26,,, | Performed by: PATHOLOGY

## 2021-11-18 PROCEDURE — 88305 TISSUE EXAM BY PATHOLOGIST: CPT | Mod: 26,,, | Performed by: PATHOLOGY

## 2021-11-18 PROCEDURE — 17000 PR DESTRUCTION(LASER SURGERY,CRYOSURGERY,CHEMOSURGERY),PREMALIGNANT LESIONS,FIRST LESION: ICD-10-PCS | Mod: 59,S$GLB,, | Performed by: DERMATOLOGY

## 2021-11-18 PROCEDURE — 99999 PR PBB SHADOW E&M-EST. PATIENT-LVL III: CPT | Mod: PBBFAC,,, | Performed by: DERMATOLOGY

## 2021-11-18 PROCEDURE — 99213 OFFICE O/P EST LOW 20 MIN: CPT | Mod: 25,S$GLB,, | Performed by: DERMATOLOGY

## 2021-11-18 PROCEDURE — 3288F PR FALLS RISK ASSESSMENT DOCUMENTED: ICD-10-PCS | Mod: S$GLB,,, | Performed by: DERMATOLOGY

## 2021-11-18 PROCEDURE — 17003 DESTRUCT PREMALG LES 2-14: CPT | Mod: S$GLB,,, | Performed by: DERMATOLOGY

## 2021-11-18 PROCEDURE — 11102 PR TANGENTIAL BIOPSY, SKIN, SINGLE LESION: ICD-10-PCS | Mod: S$GLB,,, | Performed by: DERMATOLOGY

## 2021-11-18 PROCEDURE — 1159F MED LIST DOCD IN RCRD: CPT | Mod: S$GLB,,, | Performed by: DERMATOLOGY

## 2021-11-18 PROCEDURE — 1160F PR REVIEW ALL MEDS BY PRESCRIBER/CLIN PHARMACIST DOCUMENTED: ICD-10-PCS | Mod: S$GLB,,, | Performed by: DERMATOLOGY

## 2021-11-18 PROCEDURE — 1101F PR PT FALLS ASSESS DOC 0-1 FALLS W/OUT INJ PAST YR: ICD-10-PCS | Mod: S$GLB,,, | Performed by: DERMATOLOGY

## 2021-11-18 PROCEDURE — 17003 DESTRUCTION, PREMALIGNANT LESIONS; SECOND THROUGH 14 LESIONS: ICD-10-PCS | Mod: S$GLB,,, | Performed by: DERMATOLOGY

## 2021-11-18 PROCEDURE — 99999 PR PBB SHADOW E&M-EST. PATIENT-LVL III: ICD-10-PCS | Mod: PBBFAC,,, | Performed by: DERMATOLOGY

## 2021-11-18 PROCEDURE — 1159F PR MEDICATION LIST DOCUMENTED IN MEDICAL RECORD: ICD-10-PCS | Mod: S$GLB,,, | Performed by: DERMATOLOGY

## 2021-11-18 PROCEDURE — 17000 DESTRUCT PREMALG LESION: CPT | Mod: 59,S$GLB,, | Performed by: DERMATOLOGY

## 2021-11-18 PROCEDURE — 3288F FALL RISK ASSESSMENT DOCD: CPT | Mod: S$GLB,,, | Performed by: DERMATOLOGY

## 2021-11-18 PROCEDURE — 88305 TISSUE EXAM BY PATHOLOGIST: CPT | Performed by: PATHOLOGY

## 2021-11-23 LAB
FINAL PATHOLOGIC DIAGNOSIS: NORMAL
GROSS: NORMAL
Lab: NORMAL
MICROSCOPIC EXAM: NORMAL

## 2021-12-20 ENCOUNTER — CLINICAL SUPPORT (OUTPATIENT)
Dept: CARDIOLOGY | Facility: HOSPITAL | Age: 81
End: 2021-12-20
Attending: FAMILY MEDICINE
Payer: MEDICARE

## 2021-12-20 ENCOUNTER — HOSPITAL ENCOUNTER (OUTPATIENT)
Dept: RADIOLOGY | Facility: HOSPITAL | Age: 81
Discharge: HOME | End: 2021-12-20
Attending: FAMILY MEDICINE
Payer: MEDICARE

## 2021-12-20 DIAGNOSIS — R06.02 SHORTNESS OF BREATH: ICD-10-CM

## 2021-12-20 DIAGNOSIS — R06.09 DYSPNEA ON EXERTION: ICD-10-CM

## 2021-12-20 LAB
CV STRESS BASE HR: 78 BPM
DIASTOLIC BLOOD PRESSURE: 90 MMHG
OHS CV CPX 1 MINUTE RECOVERY HEART RATE: 129 BPM
OHS CV CPX 85 PERCENT MAX PREDICTED HEART RATE MALE: 119
OHS CV CPX ESTIMATED METS: 7.1
OHS CV CPX MAX PREDICTED HEART RATE: 140
OHS CV CPX PATIENT IS FEMALE: 0
OHS CV CPX PATIENT IS MALE: 1
OHS CV CPX PEAK DIASTOLIC BLOOD PRESSURE: 89 MMHG
OHS CV CPX PEAK HEAR RATE: 141 BPM
OHS CV CPX PEAK RATE PRESSURE PRODUCT: NORMAL
OHS CV CPX PEAK SYSTOLIC BLOOD PRESSURE: 229 MMHG
OHS CV CPX PERCENT MAX PREDICTED HEART RATE ACHIEVED: 101
OHS CV CPX RATE PRESSURE PRODUCT PRESENTING: NORMAL
STRESS ECHO POST EXERCISE DUR MIN: 5 MINUTES
STRESS ECHO POST EXERCISE DUR SEC: 6 SECONDS
SYSTOLIC BLOOD PRESSURE: 147 MMHG

## 2021-12-20 PROCEDURE — 93018 CV STRESS TEST I&R ONLY: CPT | Mod: ,,, | Performed by: SPECIALIST

## 2021-12-20 PROCEDURE — 93016 CV STRESS TEST SUPVJ ONLY: CPT | Mod: ,,, | Performed by: SPECIALIST

## 2021-12-20 PROCEDURE — 93017 CV STRESS TEST TRACING ONLY: CPT

## 2021-12-20 PROCEDURE — 93016 NUCLEAR STRESS TEST (CUPID ONLY): ICD-10-PCS | Mod: ,,, | Performed by: SPECIALIST

## 2021-12-20 PROCEDURE — A9502 TC99M TETROFOSMIN: HCPCS

## 2021-12-20 PROCEDURE — 78452 HT MUSCLE IMAGE SPECT MULT: CPT | Mod: TC

## 2021-12-20 PROCEDURE — 93018 NUCLEAR STRESS TEST (CUPID ONLY): ICD-10-PCS | Mod: ,,, | Performed by: SPECIALIST

## 2021-12-21 ENCOUNTER — TELEPHONE (OUTPATIENT)
Dept: FAMILY MEDICINE | Facility: CLINIC | Age: 81
End: 2021-12-21
Payer: MEDICARE

## 2021-12-21 DIAGNOSIS — R94.39 ABNORMAL STRESS TEST: ICD-10-CM

## 2021-12-21 DIAGNOSIS — I51.89 MYOCARDIAL DYSFUNCTION: Primary | ICD-10-CM

## 2021-12-22 ENCOUNTER — OFFICE VISIT (OUTPATIENT)
Dept: CARDIOLOGY | Facility: CLINIC | Age: 81
End: 2021-12-22
Payer: MEDICARE

## 2021-12-22 VITALS
OXYGEN SATURATION: 97 % | HEIGHT: 70 IN | BODY MASS INDEX: 26.48 KG/M2 | HEART RATE: 88 BPM | SYSTOLIC BLOOD PRESSURE: 142 MMHG | WEIGHT: 185 LBS | DIASTOLIC BLOOD PRESSURE: 80 MMHG

## 2021-12-22 DIAGNOSIS — R94.39 ABNORMAL STRESS TEST: ICD-10-CM

## 2021-12-22 DIAGNOSIS — I21.09 MYOCARDIAL INFARCTION INVOLVING OTHER CORONARY ARTERY OF ANTERIOR WALL, UNSPECIFIED MI TYPE: ICD-10-CM

## 2021-12-22 DIAGNOSIS — R07.9 CHEST PAIN, UNSPECIFIED TYPE: Primary | ICD-10-CM

## 2021-12-22 DIAGNOSIS — I25.10 ASCVD (ARTERIOSCLEROTIC CARDIOVASCULAR DISEASE): Primary | ICD-10-CM

## 2021-12-22 DIAGNOSIS — I51.89 MYOCARDIAL DYSFUNCTION: ICD-10-CM

## 2021-12-22 DIAGNOSIS — R93.1 ABNORMAL NUCLEAR CARDIAC IMAGING TEST: ICD-10-CM

## 2021-12-22 PROCEDURE — 99205 OFFICE O/P NEW HI 60 MIN: CPT | Mod: S$GLB,,, | Performed by: SPECIALIST

## 2021-12-22 PROCEDURE — 3079F DIAST BP 80-89 MM HG: CPT | Mod: CPTII,S$GLB,, | Performed by: SPECIALIST

## 2021-12-22 PROCEDURE — 1159F MED LIST DOCD IN RCRD: CPT | Mod: CPTII,S$GLB,, | Performed by: SPECIALIST

## 2021-12-22 PROCEDURE — 1126F AMNT PAIN NOTED NONE PRSNT: CPT | Mod: CPTII,S$GLB,, | Performed by: SPECIALIST

## 2021-12-22 PROCEDURE — 1101F PR PT FALLS ASSESS DOC 0-1 FALLS W/OUT INJ PAST YR: ICD-10-PCS | Mod: CPTII,S$GLB,, | Performed by: SPECIALIST

## 2021-12-22 PROCEDURE — 1126F PR PAIN SEVERITY QUANTIFIED, NO PAIN PRESENT: ICD-10-PCS | Mod: CPTII,S$GLB,, | Performed by: SPECIALIST

## 2021-12-22 PROCEDURE — 1160F PR REVIEW ALL MEDS BY PRESCRIBER/CLIN PHARMACIST DOCUMENTED: ICD-10-PCS | Mod: CPTII,S$GLB,, | Performed by: SPECIALIST

## 2021-12-22 PROCEDURE — 3077F PR MOST RECENT SYSTOLIC BLOOD PRESSURE >= 140 MM HG: ICD-10-PCS | Mod: CPTII,S$GLB,, | Performed by: SPECIALIST

## 2021-12-22 PROCEDURE — 1159F PR MEDICATION LIST DOCUMENTED IN MEDICAL RECORD: ICD-10-PCS | Mod: CPTII,S$GLB,, | Performed by: SPECIALIST

## 2021-12-22 PROCEDURE — 99205 PR OFFICE/OUTPT VISIT, NEW, LEVL V, 60-74 MIN: ICD-10-PCS | Mod: S$GLB,,, | Performed by: SPECIALIST

## 2021-12-22 PROCEDURE — 1101F PT FALLS ASSESS-DOCD LE1/YR: CPT | Mod: CPTII,S$GLB,, | Performed by: SPECIALIST

## 2021-12-22 PROCEDURE — 3288F PR FALLS RISK ASSESSMENT DOCUMENTED: ICD-10-PCS | Mod: CPTII,S$GLB,, | Performed by: SPECIALIST

## 2021-12-22 PROCEDURE — 3077F SYST BP >= 140 MM HG: CPT | Mod: CPTII,S$GLB,, | Performed by: SPECIALIST

## 2021-12-22 PROCEDURE — 1160F RVW MEDS BY RX/DR IN RCRD: CPT | Mod: CPTII,S$GLB,, | Performed by: SPECIALIST

## 2021-12-22 PROCEDURE — 3079F PR MOST RECENT DIASTOLIC BLOOD PRESSURE 80-89 MM HG: ICD-10-PCS | Mod: CPTII,S$GLB,, | Performed by: SPECIALIST

## 2021-12-22 PROCEDURE — 3288F FALL RISK ASSESSMENT DOCD: CPT | Mod: CPTII,S$GLB,, | Performed by: SPECIALIST

## 2021-12-22 RX ORDER — SODIUM CHLORIDE 450 MG/100ML
INJECTION, SOLUTION INTRAVENOUS CONTINUOUS
Status: CANCELLED | OUTPATIENT
Start: 2021-12-22

## 2021-12-22 RX ORDER — NITROGLYCERIN 0.4 MG/1
0.4 TABLET SUBLINGUAL EVERY 5 MIN PRN
Qty: 25 TABLET | Refills: 6 | Status: SHIPPED | OUTPATIENT
Start: 2021-12-22 | End: 2024-02-27

## 2021-12-22 RX ORDER — DIPHENHYDRAMINE HCL 25 MG
50 CAPSULE ORAL
Status: CANCELLED | OUTPATIENT
Start: 2021-12-22

## 2021-12-23 ENCOUNTER — TELEPHONE (OUTPATIENT)
Dept: CARDIOLOGY | Facility: CLINIC | Age: 81
End: 2021-12-23
Payer: MEDICARE

## 2021-12-28 ENCOUNTER — TELEPHONE (OUTPATIENT)
Dept: CARDIOLOGY | Facility: CLINIC | Age: 81
End: 2021-12-28
Payer: MEDICARE

## 2022-01-03 ENCOUNTER — HOSPITAL ENCOUNTER (OUTPATIENT)
Dept: PREADMISSION TESTING | Facility: HOSPITAL | Age: 82
Discharge: HOME | End: 2022-01-03
Attending: INTERNAL MEDICINE
Payer: MEDICARE

## 2022-01-03 VITALS — WEIGHT: 185 LBS | BODY MASS INDEX: 25.9 KG/M2 | HEIGHT: 71 IN

## 2022-01-03 DIAGNOSIS — R93.1 ABNORMAL NUCLEAR CARDIAC IMAGING TEST: ICD-10-CM

## 2022-01-03 DIAGNOSIS — R07.9 CHEST PAIN, UNSPECIFIED TYPE: ICD-10-CM

## 2022-01-03 DIAGNOSIS — Z01.818 PRE-OP TESTING: ICD-10-CM

## 2022-01-03 LAB
ALBUMIN SERPL BCP-MCNC: 4.1 G/DL (ref 3.5–5.2)
ALP SERPL-CCNC: 88 U/L (ref 55–135)
ALT SERPL W/O P-5'-P-CCNC: 28 U/L (ref 10–44)
ANION GAP SERPL CALC-SCNC: 10 MMOL/L (ref 8–16)
APTT PPP: 29.7 SEC (ref 23.3–35.1)
AST SERPL-CCNC: 23 U/L (ref 10–40)
BASOPHILS # BLD AUTO: 0.02 K/UL (ref 0–0.2)
BASOPHILS NFR BLD: 0.3 % (ref 0–1.9)
BILIRUB SERPL-MCNC: 1.8 MG/DL (ref 0.1–1)
BUN SERPL-MCNC: 14 MG/DL (ref 8–23)
CALCIUM SERPL-MCNC: 9.1 MG/DL (ref 8.7–10.5)
CHLORIDE SERPL-SCNC: 102 MMOL/L (ref 95–110)
CO2 SERPL-SCNC: 28 MMOL/L (ref 23–29)
CREAT SERPL-MCNC: 1 MG/DL (ref 0.5–1.4)
DIFFERENTIAL METHOD: NORMAL
EOSINOPHIL # BLD AUTO: 0.2 K/UL (ref 0–0.5)
EOSINOPHIL NFR BLD: 3.1 % (ref 0–8)
ERYTHROCYTE [DISTWIDTH] IN BLOOD BY AUTOMATED COUNT: 12.3 % (ref 11.5–14.5)
EST. GFR  (AFRICAN AMERICAN): >60 ML/MIN/1.73 M^2
EST. GFR  (NON AFRICAN AMERICAN): >60 ML/MIN/1.73 M^2
GLUCOSE SERPL-MCNC: 114 MG/DL (ref 70–110)
HCT VFR BLD AUTO: 48 % (ref 40–54)
HGB BLD-MCNC: 16.3 G/DL (ref 14–18)
IMM GRANULOCYTES # BLD AUTO: 0.02 K/UL (ref 0–0.04)
IMM GRANULOCYTES NFR BLD AUTO: 0.3 % (ref 0–0.5)
INR PPP: 1.3
LYMPHOCYTES # BLD AUTO: 2.1 K/UL (ref 1–4.8)
LYMPHOCYTES NFR BLD: 28.6 % (ref 18–48)
MCH RBC QN AUTO: 30.9 PG (ref 27–31)
MCHC RBC AUTO-ENTMCNC: 34 G/DL (ref 32–36)
MCV RBC AUTO: 91 FL (ref 82–98)
MONOCYTES # BLD AUTO: 0.8 K/UL (ref 0.3–1)
MONOCYTES NFR BLD: 10.9 % (ref 4–15)
NEUTROPHILS # BLD AUTO: 4.1 K/UL (ref 1.8–7.7)
NEUTROPHILS NFR BLD: 56.8 % (ref 38–73)
NRBC BLD-RTO: 0 /100 WBC
PLATELET # BLD AUTO: 229 K/UL (ref 150–450)
PMV BLD AUTO: 11.1 FL (ref 9.2–12.9)
POTASSIUM SERPL-SCNC: 4.1 MMOL/L (ref 3.5–5.1)
PROT SERPL-MCNC: 7.5 G/DL (ref 6–8.4)
PROTHROMBIN TIME: 14.9 SEC (ref 11.4–13.7)
RBC # BLD AUTO: 5.27 M/UL (ref 4.6–6.2)
SODIUM SERPL-SCNC: 140 MMOL/L (ref 136–145)
WBC # BLD AUTO: 7.16 K/UL (ref 3.9–12.7)

## 2022-01-03 PROCEDURE — 80053 COMPREHEN METABOLIC PANEL: CPT | Performed by: NURSE PRACTITIONER

## 2022-01-03 PROCEDURE — 36415 COLL VENOUS BLD VENIPUNCTURE: CPT | Performed by: NURSE PRACTITIONER

## 2022-01-03 PROCEDURE — 85730 THROMBOPLASTIN TIME PARTIAL: CPT | Performed by: NURSE PRACTITIONER

## 2022-01-03 PROCEDURE — 93010 EKG 12-LEAD: ICD-10-PCS | Mod: ,,, | Performed by: INTERNAL MEDICINE

## 2022-01-03 PROCEDURE — 93010 ELECTROCARDIOGRAM REPORT: CPT | Mod: ,,, | Performed by: INTERNAL MEDICINE

## 2022-01-03 PROCEDURE — 85610 PROTHROMBIN TIME: CPT | Performed by: NURSE PRACTITIONER

## 2022-01-03 PROCEDURE — 93005 ELECTROCARDIOGRAM TRACING: CPT | Performed by: INTERNAL MEDICINE

## 2022-01-03 PROCEDURE — 85025 COMPLETE CBC W/AUTO DIFF WBC: CPT | Performed by: NURSE PRACTITIONER

## 2022-01-03 NOTE — DISCHARGE INSTRUCTIONS
 Nothing to eat or drink after midnight the night before your procedure.   Do not take any medications the morning of your procedure   Bring all your medications with you in the original pill bottles from pharmacy.   Do your chlorhexidine wash the night before and morning of your procedure.   Make arrangements for someone you know to drive you home after your procedure. Taxi and Uber are not acceptable.

## 2022-01-04 ENCOUNTER — HOSPITAL ENCOUNTER (OUTPATIENT)
Facility: HOSPITAL | Age: 82
Discharge: HOME | End: 2022-01-04
Attending: INTERNAL MEDICINE | Admitting: INTERNAL MEDICINE
Payer: MEDICARE

## 2022-01-04 ENCOUNTER — TELEPHONE (OUTPATIENT)
Dept: CARDIOLOGY | Facility: CLINIC | Age: 82
End: 2022-01-04
Payer: MEDICARE

## 2022-01-04 VITALS
DIASTOLIC BLOOD PRESSURE: 92 MMHG | WEIGHT: 185 LBS | OXYGEN SATURATION: 99 % | TEMPERATURE: 97 F | HEIGHT: 71 IN | SYSTOLIC BLOOD PRESSURE: 179 MMHG | BODY MASS INDEX: 25.9 KG/M2 | HEART RATE: 83 BPM | RESPIRATION RATE: 16 BRPM

## 2022-01-04 DIAGNOSIS — R07.9 CHEST PAIN, UNSPECIFIED TYPE: ICD-10-CM

## 2022-01-04 DIAGNOSIS — R93.1 ABNORMAL NUCLEAR CARDIAC IMAGING TEST: ICD-10-CM

## 2022-01-04 LAB — SARS-COV-2 RDRP RESP QL NAA+PROBE: POSITIVE

## 2022-01-04 PROCEDURE — U0002 COVID-19 LAB TEST NON-CDC: HCPCS | Performed by: INTERNAL MEDICINE

## 2022-01-04 PROCEDURE — 25000003 PHARM REV CODE 250: Performed by: NURSE PRACTITIONER

## 2022-01-04 RX ORDER — SODIUM CHLORIDE 450 MG/100ML
INJECTION, SOLUTION INTRAVENOUS CONTINUOUS
Status: DISCONTINUED | OUTPATIENT
Start: 2022-01-04 | End: 2022-01-04 | Stop reason: HOSPADM

## 2022-01-04 RX ORDER — DIPHENHYDRAMINE HCL 25 MG
50 CAPSULE ORAL
Status: DISCONTINUED | OUTPATIENT
Start: 2022-01-04 | End: 2022-01-04 | Stop reason: HOSPADM

## 2022-01-04 RX ADMIN — SODIUM CHLORIDE: 0.45 INJECTION, SOLUTION INTRAVENOUS at 06:01

## 2022-01-04 RX ADMIN — DIPHENHYDRAMINE HYDROCHLORIDE 50 MG: 25 CAPSULE ORAL at 06:01

## 2022-01-04 NOTE — TELEPHONE ENCOUNTER
----- Message from Ginger Horowitz sent at 1/4/2022  8:51 AM CST -----  Contact: wife/yoshi  Type: Needs Medical Advice  Who Called:  wife/yoshi    Best Call Back Number: [phone]    Additional Information: wife states she needs angiogram rescheduled pt tested positive for covid today 1/4

## 2022-01-06 ENCOUNTER — TELEPHONE (OUTPATIENT)
Dept: CARDIOLOGY | Facility: CLINIC | Age: 82
End: 2022-01-06
Payer: MEDICARE

## 2022-01-06 NOTE — TELEPHONE ENCOUNTER
----- Message from Raz Taveras sent at 1/6/2022  1:28 PM CST -----  Contact: pt  Type: Needs Medical Advice    Who Called:wife  Best Call Back Number:[phone]    Additional Information: needing a call back from nurse to setup Angiogram please call back     Please Advise-Thank you

## 2022-01-07 ENCOUNTER — LAB VISIT (OUTPATIENT)
Dept: PRIMARY CARE CLINIC | Facility: OTHER | Age: 82
End: 2022-01-07
Attending: INTERNAL MEDICINE
Payer: MEDICARE

## 2022-01-07 DIAGNOSIS — Z20.822 ENCOUNTER FOR LABORATORY TESTING FOR COVID-19 VIRUS: ICD-10-CM

## 2022-01-07 PROCEDURE — U0003 INFECTIOUS AGENT DETECTION BY NUCLEIC ACID (DNA OR RNA); SEVERE ACUTE RESPIRATORY SYNDROME CORONAVIRUS 2 (SARS-COV-2) (CORONAVIRUS DISEASE [COVID-19]), AMPLIFIED PROBE TECHNIQUE, MAKING USE OF HIGH THROUGHPUT TECHNOLOGIES AS DESCRIBED BY CMS-2020-01-R: HCPCS | Performed by: INTERNAL MEDICINE

## 2022-01-09 DIAGNOSIS — U07.1 COVID-19 VIRUS DETECTED: ICD-10-CM

## 2022-01-09 LAB
SARS-COV-2 RNA RESP QL NAA+PROBE: DETECTED
SARS-COV-2- CYCLE NUMBER: 30

## 2022-01-26 ENCOUNTER — TELEPHONE (OUTPATIENT)
Dept: CARDIOLOGY | Facility: CLINIC | Age: 82
End: 2022-01-26
Payer: MEDICARE

## 2022-01-26 DIAGNOSIS — R93.1 ABNORMAL NUCLEAR CARDIAC IMAGING TEST: Primary | ICD-10-CM

## 2022-01-26 DIAGNOSIS — Z01.810 PREOP CARDIOVASCULAR EXAM: ICD-10-CM

## 2022-01-26 DIAGNOSIS — I25.10 ASCVD (ARTERIOSCLEROTIC CARDIOVASCULAR DISEASE): ICD-10-CM

## 2022-01-26 NOTE — TELEPHONE ENCOUNTER
----- Message from Raz Taveras sent at 1/26/2022  8:13 AM CST -----  Contact: wife  Type: Needs Medical Advice    Who Called:wife  Best Call Back Number:[phone] or [phone]     Additional Information: Requesting callback regarding pt did not have angiogram due to covid + on 01/04 seeing if he should goto appt on 01/31 if he didn't have angiogram please call back   Please Advise-Thank you

## 2022-01-27 DIAGNOSIS — I25.10 ASCVD (ARTERIOSCLEROTIC CARDIOVASCULAR DISEASE): Primary | ICD-10-CM

## 2022-01-27 DIAGNOSIS — R79.1 ABNORMAL COAGULATION PROFILE: ICD-10-CM

## 2022-01-27 RX ORDER — SODIUM CHLORIDE 0.9 % (FLUSH) 0.9 %
10 SYRINGE (ML) INJECTION
Status: DISCONTINUED | OUTPATIENT
Start: 2022-01-27 | End: 2022-02-07 | Stop reason: HOSPADM

## 2022-01-27 RX ORDER — SODIUM CHLORIDE 9 MG/ML
INJECTION, SOLUTION INTRAVENOUS ONCE
Status: CANCELLED | OUTPATIENT
Start: 2022-01-27 | End: 2022-01-27

## 2022-01-27 RX ORDER — DIPHENHYDRAMINE HCL 25 MG
50 CAPSULE ORAL
Status: CANCELLED | OUTPATIENT
Start: 2022-01-27

## 2022-01-27 NOTE — TELEPHONE ENCOUNTER
S/w pt's wife & advised r/s'd f/u appt to 2/28. Trying to get cath scheduled for 2/7. Waiting to hear back from . Will call her back with dates & times

## 2022-01-27 NOTE — TELEPHONE ENCOUNTER
----- Message from Elvis Chakraborty sent at 1/27/2022  8:34 AM CST -----  Type:  Needs Medical Advice    Who Called: pt wife   Symptoms (please be specific):   How long has patient had these symptoms:   Pharmacy name and phone #:    Would the patient rather a call back or a response via [employer]? Call back   Best Call Back Number: primary on file  Additional Information: requesting call back to discuss upcoming appt, has left three messages for office to return call, no correspondence

## 2022-02-04 ENCOUNTER — HOSPITAL ENCOUNTER (OUTPATIENT)
Dept: PREADMISSION TESTING | Facility: HOSPITAL | Age: 82
Discharge: HOME | End: 2022-02-04
Attending: INTERNAL MEDICINE
Payer: MEDICARE

## 2022-02-04 VITALS — WEIGHT: 185 LBS | HEIGHT: 71 IN | BODY MASS INDEX: 25.9 KG/M2

## 2022-02-04 DIAGNOSIS — I25.10 ASCVD (ARTERIOSCLEROTIC CARDIOVASCULAR DISEASE): ICD-10-CM

## 2022-02-04 DIAGNOSIS — R93.1 ABNORMAL NUCLEAR CARDIAC IMAGING TEST: ICD-10-CM

## 2022-02-04 DIAGNOSIS — Z01.810 PREOP CARDIOVASCULAR EXAM: ICD-10-CM

## 2022-02-04 LAB
ALBUMIN SERPL BCP-MCNC: 4 G/DL (ref 3.5–5.2)
ALP SERPL-CCNC: 75 U/L (ref 55–135)
ALT SERPL W/O P-5'-P-CCNC: 29 U/L (ref 10–44)
ANION GAP SERPL CALC-SCNC: 7 MMOL/L (ref 8–16)
AST SERPL-CCNC: 23 U/L (ref 10–40)
BASOPHILS # BLD AUTO: 0.03 K/UL (ref 0–0.2)
BASOPHILS NFR BLD: 0.3 % (ref 0–1.9)
BILIRUB SERPL-MCNC: 2 MG/DL (ref 0.1–1)
BILIRUB UR QL STRIP: NEGATIVE
BUN SERPL-MCNC: 16 MG/DL (ref 8–23)
CALCIUM SERPL-MCNC: 9.1 MG/DL (ref 8.7–10.5)
CHLORIDE SERPL-SCNC: 105 MMOL/L (ref 95–110)
CLARITY UR: CLEAR
CO2 SERPL-SCNC: 27 MMOL/L (ref 23–29)
COLOR UR: YELLOW
CREAT SERPL-MCNC: 0.9 MG/DL (ref 0.5–1.4)
DIFFERENTIAL METHOD: NORMAL
EOSINOPHIL # BLD AUTO: 0.2 K/UL (ref 0–0.5)
EOSINOPHIL NFR BLD: 2.5 % (ref 0–8)
ERYTHROCYTE [DISTWIDTH] IN BLOOD BY AUTOMATED COUNT: 12.4 % (ref 11.5–14.5)
EST. GFR  (AFRICAN AMERICAN): >60 ML/MIN/1.73 M^2
EST. GFR  (NON AFRICAN AMERICAN): >60 ML/MIN/1.73 M^2
GLUCOSE SERPL-MCNC: 118 MG/DL (ref 70–110)
GLUCOSE UR QL STRIP: NEGATIVE
HCT VFR BLD AUTO: 46.6 % (ref 40–54)
HGB BLD-MCNC: 16.1 G/DL (ref 14–18)
HGB UR QL STRIP: NEGATIVE
IMM GRANULOCYTES # BLD AUTO: 0.02 K/UL (ref 0–0.04)
IMM GRANULOCYTES NFR BLD AUTO: 0.2 % (ref 0–0.5)
INR PPP: 1.2
KETONES UR QL STRIP: NEGATIVE
LEUKOCYTE ESTERASE UR QL STRIP: NEGATIVE
LYMPHOCYTES # BLD AUTO: 2.8 K/UL (ref 1–4.8)
LYMPHOCYTES NFR BLD: 31.4 % (ref 18–48)
MCH RBC QN AUTO: 31 PG (ref 27–31)
MCHC RBC AUTO-ENTMCNC: 34.5 G/DL (ref 32–36)
MCV RBC AUTO: 90 FL (ref 82–98)
MONOCYTES # BLD AUTO: 0.6 K/UL (ref 0.3–1)
MONOCYTES NFR BLD: 6.4 % (ref 4–15)
NEUTROPHILS # BLD AUTO: 5.2 K/UL (ref 1.8–7.7)
NEUTROPHILS NFR BLD: 59.2 % (ref 38–73)
NITRITE UR QL STRIP: NEGATIVE
NRBC BLD-RTO: 0 /100 WBC
PH UR STRIP: 6 [PH] (ref 5–8)
PLATELET # BLD AUTO: 207 K/UL (ref 150–450)
PMV BLD AUTO: 10.9 FL (ref 9.2–12.9)
POTASSIUM SERPL-SCNC: 4.2 MMOL/L (ref 3.5–5.1)
PROT SERPL-MCNC: 7.1 G/DL (ref 6–8.4)
PROT UR QL STRIP: NEGATIVE
PROTHROMBIN TIME: 14.1 SEC (ref 11.4–13.7)
RBC # BLD AUTO: 5.19 M/UL (ref 4.6–6.2)
SODIUM SERPL-SCNC: 139 MMOL/L (ref 136–145)
SP GR UR STRIP: 1.02 (ref 1–1.03)
URN SPEC COLLECT METH UR: NORMAL
UROBILINOGEN UR STRIP-ACNC: NEGATIVE EU/DL
WBC # BLD AUTO: 8.78 K/UL (ref 3.9–12.7)

## 2022-02-04 PROCEDURE — 36415 COLL VENOUS BLD VENIPUNCTURE: CPT | Performed by: SPECIALIST

## 2022-02-04 PROCEDURE — 93010 ELECTROCARDIOGRAM REPORT: CPT | Mod: ,,, | Performed by: SPECIALIST

## 2022-02-04 PROCEDURE — 81003 URINALYSIS AUTO W/O SCOPE: CPT | Performed by: SPECIALIST

## 2022-02-04 PROCEDURE — 80053 COMPREHEN METABOLIC PANEL: CPT | Performed by: SPECIALIST

## 2022-02-04 PROCEDURE — 93010 EKG 12-LEAD: ICD-10-PCS | Mod: ,,, | Performed by: SPECIALIST

## 2022-02-04 PROCEDURE — 85610 PROTHROMBIN TIME: CPT | Performed by: SPECIALIST

## 2022-02-04 PROCEDURE — 93005 ELECTROCARDIOGRAM TRACING: CPT | Performed by: SPECIALIST

## 2022-02-04 PROCEDURE — 85025 COMPLETE CBC W/AUTO DIFF WBC: CPT | Performed by: SPECIALIST

## 2022-02-04 NOTE — DISCHARGE INSTRUCTIONS
 Nothing to eat or drink after midnight the night before your procedure.   Do not take any medications the morning of your procedure   Bring all your medications with you in the original pill bottles from pharmacy.   If you take blood thinners, ask your doctor if you should stop taking them.   Do your chlorhexidine wash the night before and morning of your procedure.   Make arrangements for someone you know to drive you home after your procedure. Taxi and Uber are not acceptable.

## 2022-02-07 ENCOUNTER — HOSPITAL ENCOUNTER (OUTPATIENT)
Facility: HOSPITAL | Age: 82
Discharge: HOME | End: 2022-02-07
Attending: INTERNAL MEDICINE | Admitting: INTERNAL MEDICINE
Payer: MEDICARE

## 2022-02-07 VITALS
RESPIRATION RATE: 14 BRPM | SYSTOLIC BLOOD PRESSURE: 103 MMHG | DIASTOLIC BLOOD PRESSURE: 51 MMHG | OXYGEN SATURATION: 96 % | TEMPERATURE: 98 F | HEART RATE: 80 BPM

## 2022-02-07 DIAGNOSIS — I25.10 ASCVD (ARTERIOSCLEROTIC CARDIOVASCULAR DISEASE): ICD-10-CM

## 2022-02-07 DIAGNOSIS — I77.1 SUBCLAVIAN ARTERY STENOSIS, LEFT: Primary | ICD-10-CM

## 2022-02-07 DIAGNOSIS — R79.1 ABNORMAL COAGULATION PROFILE: ICD-10-CM

## 2022-02-07 DIAGNOSIS — I65.29 STENOSIS OF CAROTID ARTERY, UNSPECIFIED LATERALITY: ICD-10-CM

## 2022-02-07 PROCEDURE — 63600175 PHARM REV CODE 636 W HCPCS: Performed by: INTERNAL MEDICINE

## 2022-02-07 PROCEDURE — 93458 PR CATH PLACE/CORON ANGIO, IMG SUPER/INTERP,W LEFT HEART VENTRICULOGRAPHY: ICD-10-PCS | Mod: 26,,, | Performed by: INTERNAL MEDICINE

## 2022-02-07 PROCEDURE — C1760 CLOSURE DEV, VASC: HCPCS | Performed by: INTERNAL MEDICINE

## 2022-02-07 PROCEDURE — C1887 CATHETER, GUIDING: HCPCS | Performed by: INTERNAL MEDICINE

## 2022-02-07 PROCEDURE — 99152 PR MOD CONSCIOUS SEDATION, SAME PHYS, 5+ YRS, FIRST 15 MIN: ICD-10-PCS | Mod: ,,, | Performed by: INTERNAL MEDICINE

## 2022-02-07 PROCEDURE — C1894 INTRO/SHEATH, NON-LASER: HCPCS | Performed by: INTERNAL MEDICINE

## 2022-02-07 PROCEDURE — 25000003 PHARM REV CODE 250: Performed by: INTERNAL MEDICINE

## 2022-02-07 PROCEDURE — 99153 MOD SED SAME PHYS/QHP EA: CPT | Performed by: INTERNAL MEDICINE

## 2022-02-07 PROCEDURE — 99152 MOD SED SAME PHYS/QHP 5/>YRS: CPT | Performed by: INTERNAL MEDICINE

## 2022-02-07 PROCEDURE — 25500020 PHARM REV CODE 255: Performed by: INTERNAL MEDICINE

## 2022-02-07 PROCEDURE — 99152 MOD SED SAME PHYS/QHP 5/>YRS: CPT | Mod: ,,, | Performed by: INTERNAL MEDICINE

## 2022-02-07 PROCEDURE — C1769 GUIDE WIRE: HCPCS | Performed by: INTERNAL MEDICINE

## 2022-02-07 PROCEDURE — 25000003 PHARM REV CODE 250

## 2022-02-07 PROCEDURE — 93458 L HRT ARTERY/VENTRICLE ANGIO: CPT | Performed by: INTERNAL MEDICINE

## 2022-02-07 PROCEDURE — 93458 L HRT ARTERY/VENTRICLE ANGIO: CPT | Mod: 26,,, | Performed by: INTERNAL MEDICINE

## 2022-02-07 DEVICE — IMPLANTABLE DEVICE: Type: IMPLANTABLE DEVICE | Site: GROIN | Status: FUNCTIONAL

## 2022-02-07 RX ORDER — DIPHENHYDRAMINE HCL 25 MG
50 CAPSULE ORAL
Status: DISCONTINUED | OUTPATIENT
Start: 2022-02-07 | End: 2022-02-07 | Stop reason: HOSPADM

## 2022-02-07 RX ORDER — ACETAMINOPHEN 325 MG/1
650 TABLET ORAL EVERY 4 HOURS PRN
Status: DISCONTINUED | OUTPATIENT
Start: 2022-02-07 | End: 2022-02-07 | Stop reason: HOSPADM

## 2022-02-07 RX ORDER — MIDAZOLAM HYDROCHLORIDE 1 MG/ML
INJECTION INTRAMUSCULAR; INTRAVENOUS
Status: DISCONTINUED | OUTPATIENT
Start: 2022-02-07 | End: 2022-02-07 | Stop reason: HOSPADM

## 2022-02-07 RX ORDER — AMLODIPINE BESYLATE 2.5 MG/1
2.5 TABLET ORAL DAILY
Qty: 30 TABLET | Refills: 11 | Status: SHIPPED | OUTPATIENT
Start: 2022-02-07 | End: 2022-12-13

## 2022-02-07 RX ORDER — SODIUM CHLORIDE 450 MG/100ML
INJECTION, SOLUTION INTRAVENOUS CONTINUOUS
Status: DISCONTINUED | OUTPATIENT
Start: 2022-02-07 | End: 2022-02-07 | Stop reason: HOSPADM

## 2022-02-07 RX ORDER — AMLODIPINE BESYLATE 2.5 MG/1
2.5 TABLET ORAL DAILY
Status: DISCONTINUED | OUTPATIENT
Start: 2022-02-07 | End: 2022-02-07 | Stop reason: HOSPADM

## 2022-02-07 RX ORDER — LIDOCAINE HYDROCHLORIDE 10 MG/ML
INJECTION, SOLUTION EPIDURAL; INFILTRATION; INTRACAUDAL; PERINEURAL
Status: DISCONTINUED | OUTPATIENT
Start: 2022-02-07 | End: 2022-02-07 | Stop reason: HOSPADM

## 2022-02-07 RX ORDER — FENTANYL CITRATE 50 UG/ML
INJECTION, SOLUTION INTRAMUSCULAR; INTRAVENOUS
Status: DISCONTINUED | OUTPATIENT
Start: 2022-02-07 | End: 2022-02-07 | Stop reason: HOSPADM

## 2022-02-07 RX ORDER — SODIUM CHLORIDE 9 MG/ML
INJECTION, SOLUTION INTRAVENOUS ONCE
Status: COMPLETED | OUTPATIENT
Start: 2022-02-07 | End: 2022-02-07

## 2022-02-07 RX ADMIN — SODIUM CHLORIDE: 0.9 INJECTION, SOLUTION INTRAVENOUS at 08:02

## 2022-02-07 RX ADMIN — DIPHENHYDRAMINE HYDROCHLORIDE 50 MG: 25 CAPSULE ORAL at 08:02

## 2022-02-07 NOTE — H&P
[facility]  Department of Cardiology  Consult Note      PATIENT NAME: Willie Liao    MRN: [id number]  TODAY'S DATE: 2022  ADMIT DATE: 2022                          CONSULT REQUESTED BY: John Linn MD    SUBJECTIVE     PRINCIPAL PROBLEM: <principal problem not specified>      REASON FOR CONSULT:        HPI:  Mr. Liao is an 81-year-old male with history though complaining of an episode of chest pains.  A stress test was performed on 2021 that showed a fixed perfusion abnormality of inferior wall consistent with a scar.  At that time the patient was having intermittent episodes of indigestion.  He was scheduled for coronary arteriogram in January.  The day of the angiogram he tested positive.  The coronary arteriogram has been delayed until now.  The patient describes his symptoms of indigestion and chest tightness have improved.  The myocardial perfusion scan was discussed with the patient in detail.  He has agreed to proceed with a coronary arteriogram risks benefits and indications have been explained in detail.  He has history of carotid stenosis as well as hypertension        Review of patient's allergies indicates:   Allergen Reactions    No known drug allergies        Past Medical History:   Diagnosis Date    Carotid stenosis     Gastritis     GERD (gastroesophageal reflux disease)     H. pylori infection     Hyperlipidemia     Shingles      Past Surgical History:   Procedure Laterality Date    arthroscope Left     knee arthroscopy, torn meniscus    COLONOSCOPY      Adrien    ECTROPION REPAIR Bilateral 3/13/2020    Procedure: REPAIR, ECTROPION, EYELID FLAPS - (Skin and/or Deep Tissue ;  Surgeon: Sarah Blackmon MD;  Location: [facility] OR [address] [place] [address];  Service: Ophthalmology;  Laterality: Bilateral;     Social History     Tobacco Use    Smoking status: Former Smoker     Packs/day: 0.25     Quit date: 1997     Years since quittin.6    Smokeless tobacco:  Never Used   Substance Use Topics    Alcohol use: No    Drug use: No        REVIEW OF SYSTEMS  CONSTITUTIONAL: Negative for chills, fatigue and fever.   EYES: No double vision, No blurred vision  NEURO: No headaches, No dizziness  RESPIRATORY: Negative for cough, shortness of breath and wheezing.    CARDIOVASCULAR:  No recent chest pain.  He has noticed that his pressure is elevated.    GI: Negative for abdominal pain, No melena, diarrhea, nausea and vomiting.   : Negative for dysuria and frequency, Negative for hematuria  SKIN: Negative for bruising, Negative for edema or discoloration noted.   ENDOCRINE: Negative for polyphagia, Negative for heat intolerance, Negative for cold intolerance  PSYCHIATRIC: Negative for depression, Negative for anxiety, Negative for memory loss  MUSCULOSKELETAL: Negative for neck pain, Negative for muscle weakness, Negative for back pain     OBJECTIVE     VITAL SIGNS (Most Recent)  Temp: 97.7 °F (36.5 °C) (02/07/22 0812)  Pulse: 88 (02/07/22 0807)  Resp: 19 (02/07/22 0807)  BP: (!) 176/95 (02/07/22 0807)  SpO2: 99 % (02/07/22 0807)    VENTILATION STATUS  Resp: 19 (02/07/22 0807)  SpO2: 99 % (02/07/22 0807)       I & O (Last 24H):No intake or output data in the 24 hours ending 02/07/22 1000    WEIGHTS  Wt Readings from Last 3 Encounters:   02/04/22 1110 83.9 kg (185 lb)   01/04/22 0614 83.9 kg (185 lb)   01/03/22 1255 83.9 kg (185 lb)       PHYSICAL EXAM  GENERAL: well built, well nourished, well-developed in no apparent distress alert and oriented.   HEENT: Normocephalic. Pupils normal and conjunctivae normal.  Mucous membranes normal, no cyanosis or icterus, trachea central,no pallor or icterus is noted..   NECK: No JVD. No bruit..   THYROID: Thyroid not enlarged. No nodules present..   CARDIAC: Regular rate and rhythm. S1 is normal.S2 is normal.No gallops, clicks or murmurs noted at this time.  CHEST ANATOMY: normal.   LUNGS: Clear to auscultation. No wheezing or rhonchi..    ABDOMEN: Soft no masses or organomegaly.  No abdomen pulsations or bruits.  Normal bowel sounds. No pulsations and no masses felt, No guarding or rebound.   URINARY: No vargas catheter   EXTREMITIES: No cyanosis, clubbing or edema noted at this time., no calf tenderness bilaterally.   PERIPHERAL VASCULAR SYSTEM: Good palpable distal pulses.   CENTRAL NERVOUS SYSTEM: No focal motor or sensory deficits noted.   SKIN: Skin without lesions, moist, well perfused.   MUSCLE STRENGTH & TONE: No noteable weakness, atrophy or abnormal movement.     HOME MEDICATIONS:  Current Facility-Administered Medications on File Prior to Encounter   Medication Dose Route Frequency Provider Last Rate Last Admin    tetracaine HCL 0.5% ophthalmic solution 1 drop  1 drop Both Eyes Once Peri Galeana MD         Current Outpatient Medications on File Prior to Encounter   Medication Sig Dispense Refill    atorvastatin (LIPITOR) 20 MG tablet Take 1 tablet (20 mg total) by mouth once daily. 90 tablet 3    CALCIUM ORAL Take 1 tablet by mouth once daily.      CYANOCOBALAMIN, VITAMIN B-12, (VITAMIN B-12 ORAL) Take 1 tablet by mouth once daily.      glucosam/chond-msm1/C/angely/bor (GLUCOSAMINE-CHOND-MSM COMPLEX ORAL) Take 1 tablet by mouth once daily.      MULTIVITAMIN WITH MINERALS/LUT (MULTIVITAMIN 50 PLUS ORAL) Take 1 tablet by mouth once daily.       nitroGLYCERIN (NITROSTAT) 0.4 MG SL tablet Place 1 tablet (0.4 mg total) under the tongue every 5 (five) minutes as needed for Chest pain. 25 tablet 6    OMEGA-3 FATTY ACIDS/FISH OIL (OMEGA 3 FISH OIL ORAL) Take 1 capsule by mouth once daily.      pantoprazole (PROTONIX) 40 MG tablet Take 40 mg by mouth once daily.      vitamin A 37491 UNIT capsule Take 10,000 Units by mouth once daily.      VITAMIN C 500 MG tablet       zinc gluconate 50 mg tablet Take 50 mg by mouth once daily.         SCHEDULED MEDS:    CONTINUOUS INFUSIONS:    PRN MEDS:diphenhydrAMINE    LABS AND DIAGNOSTICS     CBC  LAST 3 DAYS  Recent Labs   Lab 02/04/22  1118   WBC 8.78   RBC 5.19   HGB 16.1   HCT 46.6   MCV 90   MCH 31.0   MCHC 34.5   RDW 12.4      MPV 10.9   GRAN 59.2  5.2   LYMPH 31.4  2.8   MONO 6.4  0.6   BASO 0.03   NRBC 0       COAGULATION LAST 3 DAYS  Recent Labs   Lab 02/04/22  1118   LABPT 14.1*   INR 1.2       CHEMISTRY LAST 3 DAYS  Recent Labs   Lab 02/04/22  1118      K 4.2      CO2 27   ANIONGAP 7*   BUN 16   CREATININE 0.9   *   CALCIUM 9.1   ALBUMIN 4.0   PROT 7.1   ALKPHOS 75   ALT 29   AST 23   BILITOT 2.0*       CARDIAC PROFILE LAST 3 DAYS  No results for input(s): BNP, CPK, CPKMB, LDH, TROPONINI in the last 168 hours.    ENDOCRINE LAST 3 DAYS  No results for input(s): TSH, PROCAL in the last 168 hours.    LAST ARTERIAL BLOOD GAS  ABG  No results for input(s): PH, PO2, PCO2, HCO3, BE in the last 168 hours.    LAST 7 DAYS MICROBIOLOGY   Microbiology Results (last 7 days)     ** No results found for the last 168 hours. **          MOST RECENT IMAGING  X-Ray Chest AP Portable  Reason: pre op Pre op angiogram; HD neg, LD neg, HTN neg    FINDINGS:  Portable chest at 814 compared with 1/26/2017 shows normal cardiomediastinal silhouette.    Lungs are clear. Pulmonary vasculature is normal. Healed right posterior seventh and eighth rib fractures are noted. No acute osseous abnormality.    IMPRESSION:  No acute cardiopulmonary abnormality.    Electronically signed by:  Jareth Anguiano MD  2/7/2022 8:29 AM CST Workstation: [id number]      ECHOCARDIOGRAM RESULTS (last 5)  No results found for this or any previous visit.      CURRENT/PREVIOUS VISIT EKG  Results for orders placed or performed during the hospital encounter of 02/04/22   EKG 12-lead    Collection Time: 02/04/22 11:25 AM    Narrative    Test Reason : R93.1,Z01.810,    Vent. Rate : 089 BPM     Atrial Rate : 089 BPM     P-R Int : 158 ms          QRS Dur : 084 ms      QT Int : 354 ms       P-R-T Axes : 059 218 052 degrees     QTc  Int : 430 ms    Normal sinus rhythm  Right superior axis deviation  Abnormal ECG  When compared with ECG of 03-JAN-2022 13:04,  No significant change was found    Referred By:             Confirmed By:            ASSESSMENT/PLAN:     There are no active hospital problems to display for this patient.      ASSESSMENT & PLAN:   Abnormal stress test the patient had symptoms of chest tightness and indigestion.    Hypertension    Recent COVID      RECOMMENDATIONS:  Proceed with a coronary arteriogram risks benefits and indications have been explained to the patient and he is in agreement to proceed        John Linn MD  [facility]  Department of Cardiology  Date of Service: 02/07/2022  10:00 AM

## 2022-02-07 NOTE — Clinical Note
The right groin was prepped. The site was prepped with ChloraPrep. The site was clipped. The patient was draped. The patient was positioned supine. The patient was secured using safety straps.

## 2022-02-07 NOTE — Clinical Note
EEG Electrode Fix    Electrodes Fixed: F8    Supplies: Glue -  signa creme     Skin break down: No       An angiography of the  right femoral artery was performed to evaluate for the placement of a closure device.

## 2022-02-07 NOTE — Clinical Note
The catheter was inserted into the ostium   right coronary artery. An angiography was performed of the right coronary arteries. Multiple views were taken. The angiography was performed via hand injection with .

## 2022-02-21 ENCOUNTER — TELEPHONE (OUTPATIENT)
Dept: VASCULAR SURGERY | Facility: CLINIC | Age: 82
End: 2022-02-21
Payer: MEDICARE

## 2022-02-21 NOTE — TELEPHONE ENCOUNTER
----- Message from Ermelinda Contreras sent at 2/21/2022 10:48 AM CST -----  .Type: Needs Medical Advice  Who Called: Ming Prescott  Best Call Back Number: [phone]   Additional Information: Pt would like to schedule a follow up appt. Pt last seen in 2017. Wife would also like to be seen, she would be a NP.  Please call to advise.  Thanks

## 2022-02-21 NOTE — TELEPHONE ENCOUNTER
Pt and wife to call PCP to schedule carotid ultrasound. Will call back to schedule appt with  if surgical evaluation is indicated.

## 2022-02-28 ENCOUNTER — OFFICE VISIT (OUTPATIENT)
Dept: CARDIOLOGY | Facility: CLINIC | Age: 82
End: 2022-02-28
Payer: MEDICARE

## 2022-02-28 VITALS
OXYGEN SATURATION: 98 % | WEIGHT: 185 LBS | HEIGHT: 71 IN | HEART RATE: 86 BPM | SYSTOLIC BLOOD PRESSURE: 140 MMHG | DIASTOLIC BLOOD PRESSURE: 80 MMHG | BODY MASS INDEX: 25.9 KG/M2

## 2022-02-28 DIAGNOSIS — I65.22 STENOSIS OF LEFT CAROTID ARTERY: ICD-10-CM

## 2022-02-28 DIAGNOSIS — E78.00 HYPERCHOLESTEREMIA: ICD-10-CM

## 2022-02-28 DIAGNOSIS — I70.0 AORTIC ATHEROSCLEROSIS: Primary | ICD-10-CM

## 2022-02-28 PROCEDURE — 99214 PR OFFICE/OUTPT VISIT, EST, LEVL IV, 30-39 MIN: ICD-10-PCS | Mod: S$GLB,,, | Performed by: SPECIALIST

## 2022-02-28 PROCEDURE — 1160F RVW MEDS BY RX/DR IN RCRD: CPT | Mod: CPTII,S$GLB,, | Performed by: SPECIALIST

## 2022-02-28 PROCEDURE — 1101F PT FALLS ASSESS-DOCD LE1/YR: CPT | Mod: CPTII,S$GLB,, | Performed by: SPECIALIST

## 2022-02-28 PROCEDURE — 99214 OFFICE O/P EST MOD 30 MIN: CPT | Mod: S$GLB,,, | Performed by: SPECIALIST

## 2022-02-28 PROCEDURE — 3288F FALL RISK ASSESSMENT DOCD: CPT | Mod: CPTII,S$GLB,, | Performed by: SPECIALIST

## 2022-02-28 PROCEDURE — 1160F PR REVIEW ALL MEDS BY PRESCRIBER/CLIN PHARMACIST DOCUMENTED: ICD-10-PCS | Mod: CPTII,S$GLB,, | Performed by: SPECIALIST

## 2022-02-28 PROCEDURE — 3077F PR MOST RECENT SYSTOLIC BLOOD PRESSURE >= 140 MM HG: ICD-10-PCS | Mod: CPTII,S$GLB,, | Performed by: SPECIALIST

## 2022-02-28 PROCEDURE — 1126F PR PAIN SEVERITY QUANTIFIED, NO PAIN PRESENT: ICD-10-PCS | Mod: CPTII,S$GLB,, | Performed by: SPECIALIST

## 2022-02-28 PROCEDURE — 1159F MED LIST DOCD IN RCRD: CPT | Mod: CPTII,S$GLB,, | Performed by: SPECIALIST

## 2022-02-28 PROCEDURE — 3077F SYST BP >= 140 MM HG: CPT | Mod: CPTII,S$GLB,, | Performed by: SPECIALIST

## 2022-02-28 PROCEDURE — 3079F DIAST BP 80-89 MM HG: CPT | Mod: CPTII,S$GLB,, | Performed by: SPECIALIST

## 2022-02-28 PROCEDURE — 1101F PR PT FALLS ASSESS DOC 0-1 FALLS W/OUT INJ PAST YR: ICD-10-PCS | Mod: CPTII,S$GLB,, | Performed by: SPECIALIST

## 2022-02-28 PROCEDURE — 1159F PR MEDICATION LIST DOCUMENTED IN MEDICAL RECORD: ICD-10-PCS | Mod: CPTII,S$GLB,, | Performed by: SPECIALIST

## 2022-02-28 PROCEDURE — 1126F AMNT PAIN NOTED NONE PRSNT: CPT | Mod: CPTII,S$GLB,, | Performed by: SPECIALIST

## 2022-02-28 PROCEDURE — 3079F PR MOST RECENT DIASTOLIC BLOOD PRESSURE 80-89 MM HG: ICD-10-PCS | Mod: CPTII,S$GLB,, | Performed by: SPECIALIST

## 2022-02-28 PROCEDURE — 3288F PR FALLS RISK ASSESSMENT DOCUMENTED: ICD-10-PCS | Mod: CPTII,S$GLB,, | Performed by: SPECIALIST

## 2022-02-28 RX ORDER — ASPIRIN 81 MG/1
81 TABLET ORAL EVERY OTHER DAY
COMMUNITY
End: 2024-02-27

## 2022-02-28 NOTE — PROGRESS NOTES
Subjective:    Patient ID:  Willie Liao is a 81 y.o. male who presents for   Hospital Follow Up (Angio f/u) and Coronary Artery Disease    feelas ok   Cath 2022 wnl  minmal cad   amlodopine =  2.5 not help  jones bp  > 90   Patient is doing well  No more chest pain  Is on cholesterol medicine  Cardiac catheterization demonstrated minimal coronary disease  bp  Today 130/70     Review of patient's allergies indicates:   Allergen Reactions    No known drug allergies        Past Medical History:   Diagnosis Date    Carotid stenosis     Gastritis     GERD (gastroesophageal reflux disease)     H. pylori infection     Hyperlipidemia     Shingles      Past Surgical History:   Procedure Laterality Date    arthroscope Left     knee arthroscopy, torn meniscus    COLONOSCOPY      Adrien    CORONARY ANGIOGRAPHY N/A 2022    Procedure: ANGIOGRAM, CORONARY ARTERY;  Surgeon: John Linn MD;  Location: [facility] CATH/EP LAB;  Service: Cardiology;  Laterality: N/A;    ECTROPION REPAIR Bilateral 3/13/2020    Procedure: REPAIR, ECTROPION, EYELID FLAPS - (Skin and/or Deep Tissue ;  Surgeon: Sarah Blackmon MD;  Location: [address];  Service: Ophthalmology;  Laterality: Bilateral;    LEFT HEART CATHETERIZATION Left 2022    Procedure: CATHETERIZATION, HEART, LEFT;  Surgeon: John Linn MD;  Location: [facility] CATH/EP LAB;  Service: Cardiology;  Laterality: Left;     Social History     Tobacco Use    Smoking status: Former Smoker     Packs/day: 0.25     Quit date: 1997     Years since quittin.6    Smokeless tobacco: Never Used   Substance Use Topics    Alcohol use: No    Drug use: No        Review of Systems     ROS        Objective:        Vitals:    22 1347   BP: (!) 140/80   Pulse: 86       Lab Results   Component Value Date    WBC 8.78 2022    HGB 16.1 2022    HCT 46.6 2022     2022    CHOL 157 11/15/2021    TRIG 132 11/15/2021    HDL 40  11/15/2021    ALT 29 02/04/2022    AST 23 02/04/2022     02/04/2022    K 4.2 02/04/2022     02/04/2022    CREATININE 0.9 02/04/2022    BUN 16 02/04/2022    CO2 27 02/04/2022    TSH 2.229 03/01/2018    PSA 0.65 03/01/2018    INR 1.2 02/04/2022    GLUF 114 (H) 02/15/2017    HGBA1C 6.1 (H) 11/15/2021        ECHOCARDIOGRAM RESULTS  No results found for this or any previous visit.      CURRENT/PREVIOUS VISIT EKG  Results for orders placed or performed during the hospital encounter of 02/04/22   EKG 12-lead    Collection Time: 02/04/22 11:25 AM    Narrative    Test Reason : R93.1,Z01.810,    Vent. Rate : 089 BPM     Atrial Rate : 089 BPM     P-R Int : 158 ms          QRS Dur : 084 ms      QT Int : 354 ms       P-R-T Axes : 059 218 052 degrees     QTc Int : 430 ms    Normal sinus rhythm  Right superior axis deviation  Abnormal ECG  When compared with ECG of 03-JAN-2022 13:04,  No significant change was found  Confirmed by Simeon Lee MD ([id number]) on 2/16/2022 9:59:05 PM    Referred By:             Confirmed By:Simeon Lee MD     No results found for this or any previous visit.    Results for orders placed in visit on 12/20/21    Nuclear Stress Test    Interpretation Summary    The EKG portion of this study is negative for ischemia.    The patient reported no chest pain during the stress test.    There were no arrhythmias during stress.    The exercise capacity was above average.    The nuclear portion of this study will be reported separately.      PHYSICAL EXAM    Physical Exam 1390/80  Lungs clear cor reg   Abdomen no masses  Extremities no flatus edema    Medication List with Changes/Refills   Current Medications    AMLODIPINE (NORVASC) 2.5 MG TABLET    Take 1 tablet (2.5 mg total) by mouth once daily.    ATORVASTATIN (LIPITOR) 20 MG TABLET    Take 1 tablet (20 mg total) by mouth once daily.    CALCIUM ORAL    Take 1 tablet by mouth once daily.    CYANOCOBALAMIN, VITAMIN B-12, (VITAMIN B-12 ORAL)     Take 1 tablet by mouth once daily.    GLUCOSAM/CHOND-MSM1/C/TIM/BOR (GLUCOSAMINE-CHOND-MSM COMPLEX ORAL)    Take 1 tablet by mouth once daily.    MULTIVITAMIN WITH MINERALS/LUT (MULTIVITAMIN 50 PLUS ORAL)    Take 1 tablet by mouth once daily.     NITROGLYCERIN (NITROSTAT) 0.4 MG SL TABLET    Place 1 tablet (0.4 mg total) under the tongue every 5 (five) minutes as needed for Chest pain.    OMEGA-3 FATTY ACIDS/FISH OIL (OMEGA 3 FISH OIL ORAL)    Take 1 capsule by mouth once daily.    PANTOPRAZOLE (PROTONIX) 40 MG TABLET    Take 40 mg by mouth once daily.    VITAMIN A 47339 UNIT CAPSULE    Take 10,000 Units by mouth once daily.    VITAMIN C 500 MG TABLET        ZINC GLUCONATE 50 MG TABLET    Take 50 mg by mouth once daily.           Assessment:     normal cath    no cad except chol    hi bp      Plan:   Stay healed current medicines  Rtc 8  Problem List Items Addressed This Visit    None          No follow-ups on file.

## 2022-05-06 NOTE — PROGRESS NOTES
Subjective:       Patient ID: Willie Liao is a 77 y.o. male.    Chief Complaint: Cough (congestion)    Cough   This is a new problem. Episode onset: 2 weeks. The problem has been waxing and waning. The problem occurs constantly. The cough is productive of sputum. Associated symptoms include ear congestion, ear pain, headaches, nasal congestion, postnasal drip and rhinorrhea. Pertinent negatives include no chest pain, chills, eye redness, fever, rash, sore throat, shortness of breath or wheezing. The symptoms are aggravated by lying down. He has tried OTC cough suppressant for the symptoms. The treatment provided no relief.     Review of Systems   Constitutional: Negative for activity change, appetite change, chills, fatigue and fever.   HENT: Positive for congestion, ear pain, postnasal drip, rhinorrhea and sinus pressure. Negative for ear discharge, facial swelling, hearing loss, mouth sores, nosebleeds, sore throat, tinnitus and trouble swallowing.    Eyes: Negative for discharge, redness and visual disturbance.   Respiratory: Positive for cough. Negative for chest tightness, shortness of breath and wheezing.    Cardiovascular: Negative for chest pain, palpitations and leg swelling.   Gastrointestinal: Negative for abdominal pain, nausea and vomiting.   Musculoskeletal: Negative for neck stiffness.   Skin: Negative for rash.   Neurological: Positive for headaches.       Objective:      Physical Exam   Constitutional: He appears well-developed and well-nourished. No distress.   HENT:   Head: Normocephalic and atraumatic.   Right Ear: External ear normal.   Left Ear: External ear normal.   Mouth/Throat: Uvula is midline and mucous membranes are normal. No uvula swelling. No oropharyngeal exudate, posterior oropharyngeal edema, posterior oropharyngeal erythema or tonsillar abscesses.   Eyes: Conjunctivae and EOM are normal. Pupils are equal, round, and reactive to light. Right eye exhibits no discharge. Left  eye exhibits no discharge.   Neck: Normal range of motion. Neck supple. No thyromegaly present.   Cardiovascular: Normal rate, regular rhythm and normal heart sounds.  Exam reveals no gallop and no friction rub.    No murmur heard.  Pulmonary/Chest: Effort normal and breath sounds normal. No respiratory distress. He has no wheezes. He has no rales.   Abdominal: Soft. Bowel sounds are normal. There is no tenderness.   Lymphadenopathy:     He has no cervical adenopathy.   Skin: He is not diaphoretic.       Assessment:       1. Sinusitis, unspecified chronicity, unspecified location        Plan:   Willie was seen today for cough.    Diagnoses and all orders for this visit:    Sinusitis, unspecified chronicity, unspecified location  -     amoxicillin-clavulanate 875-125mg (AUGMENTIN) 875-125 mg per tablet; Take 1 tablet by mouth 2 (two) times daily.  -     predniSONE (DELTASONE) 10 MG tablet; Take 2 pills a day for 3 days then 1 pill a day for 3 days  -     promethazine-dextromethorphan (PROMETHAZINE-DM) 6.25-15 mg/5 mL Syrp; Take 5 mLs by mouth every 6 (six) hours as needed.       PAIN

## 2022-05-18 DIAGNOSIS — I25.10 ASCVD (ARTERIOSCLEROTIC CARDIOVASCULAR DISEASE): Primary | ICD-10-CM

## 2022-05-18 DIAGNOSIS — I65.29 STENOSIS OF CAROTID ARTERY, UNSPECIFIED LATERALITY: ICD-10-CM

## 2022-09-07 ENCOUNTER — HOSPITAL ENCOUNTER (OUTPATIENT)
Dept: RADIOLOGY | Facility: HOSPITAL | Age: 82
Discharge: HOME | End: 2022-09-07
Attending: SPECIALIST
Payer: MEDICARE

## 2022-09-07 DIAGNOSIS — I25.10 ASCVD (ARTERIOSCLEROTIC CARDIOVASCULAR DISEASE): ICD-10-CM

## 2022-09-07 DIAGNOSIS — I65.29 STENOSIS OF CAROTID ARTERY, UNSPECIFIED LATERALITY: ICD-10-CM

## 2022-09-07 PROCEDURE — 93880 EXTRACRANIAL BILAT STUDY: CPT | Mod: TC

## 2022-09-22 ENCOUNTER — OFFICE VISIT (OUTPATIENT)
Dept: CARDIOLOGY | Facility: CLINIC | Age: 82
End: 2022-09-22
Payer: MEDICARE

## 2022-09-22 VITALS
HEART RATE: 82 BPM | DIASTOLIC BLOOD PRESSURE: 70 MMHG | BODY MASS INDEX: 25.94 KG/M2 | OXYGEN SATURATION: 96 % | HEIGHT: 71 IN | WEIGHT: 185.31 LBS | SYSTOLIC BLOOD PRESSURE: 122 MMHG

## 2022-09-22 DIAGNOSIS — I65.22 STENOSIS OF LEFT CAROTID ARTERY: ICD-10-CM

## 2022-09-22 DIAGNOSIS — E78.00 HYPERCHOLESTEREMIA: ICD-10-CM

## 2022-09-22 DIAGNOSIS — I25.10 ASCVD (ARTERIOSCLEROTIC CARDIOVASCULAR DISEASE): Primary | ICD-10-CM

## 2022-09-22 PROCEDURE — 1159F MED LIST DOCD IN RCRD: CPT | Mod: CPTII,S$GLB,, | Performed by: SPECIALIST

## 2022-09-22 PROCEDURE — 3074F PR MOST RECENT SYSTOLIC BLOOD PRESSURE < 130 MM HG: ICD-10-PCS | Mod: CPTII,S$GLB,, | Performed by: SPECIALIST

## 2022-09-22 PROCEDURE — 1126F AMNT PAIN NOTED NONE PRSNT: CPT | Mod: CPTII,S$GLB,, | Performed by: SPECIALIST

## 2022-09-22 PROCEDURE — 1126F PR PAIN SEVERITY QUANTIFIED, NO PAIN PRESENT: ICD-10-PCS | Mod: CPTII,S$GLB,, | Performed by: SPECIALIST

## 2022-09-22 PROCEDURE — 99214 PR OFFICE/OUTPT VISIT, EST, LEVL IV, 30-39 MIN: ICD-10-PCS | Mod: S$GLB,,, | Performed by: SPECIALIST

## 2022-09-22 PROCEDURE — 3074F SYST BP LT 130 MM HG: CPT | Mod: CPTII,S$GLB,, | Performed by: SPECIALIST

## 2022-09-22 PROCEDURE — 1160F PR REVIEW ALL MEDS BY PRESCRIBER/CLIN PHARMACIST DOCUMENTED: ICD-10-PCS | Mod: CPTII,S$GLB,, | Performed by: SPECIALIST

## 2022-09-22 PROCEDURE — 99214 OFFICE O/P EST MOD 30 MIN: CPT | Mod: S$GLB,,, | Performed by: SPECIALIST

## 2022-09-22 PROCEDURE — 3078F DIAST BP <80 MM HG: CPT | Mod: CPTII,S$GLB,, | Performed by: SPECIALIST

## 2022-09-22 PROCEDURE — 3078F PR MOST RECENT DIASTOLIC BLOOD PRESSURE < 80 MM HG: ICD-10-PCS | Mod: CPTII,S$GLB,, | Performed by: SPECIALIST

## 2022-09-22 PROCEDURE — 1159F PR MEDICATION LIST DOCUMENTED IN MEDICAL RECORD: ICD-10-PCS | Mod: CPTII,S$GLB,, | Performed by: SPECIALIST

## 2022-09-22 PROCEDURE — 1160F RVW MEDS BY RX/DR IN RCRD: CPT | Mod: CPTII,S$GLB,, | Performed by: SPECIALIST

## 2022-09-22 RX ORDER — EZETIMIBE 10 MG/1
10 TABLET ORAL DAILY
Qty: 90 TABLET | Refills: 3 | Status: SHIPPED | OUTPATIENT
Start: 2022-09-22 | End: 2023-10-09

## 2022-09-22 NOTE — PROGRESS NOTES
Subjective:    Patient ID:  Willie Liao is a 82 y.o. male who presents for   Coronary Artery Disease, Carotid Artery Disease, Hyperlipidemia, and Results (Carotid u/s)    HPI basically pleasant 82-year-old male is in excellent health  50-69% stenosis of the left internal carotid while on atorvastatin 20  LDL is not at goal  Cardiac catheterization demonstrated no significant coronary disease  He is not short of breath  He has not been on aspirin    Review of patient's allergies indicates:   Allergen Reactions    No known drug allergies        Past Medical History:   Diagnosis Date    Carotid stenosis     Coronary artery disease     Gastritis     GERD (gastroesophageal reflux disease)     H. pylori infection     Hyperlipidemia     Shingles      Past Surgical History:   Procedure Laterality Date    arthroscope Left     knee arthroscopy, torn meniscus    COLONOSCOPY      Adrien    CORONARY ANGIOGRAPHY N/A 2022    Procedure: ANGIOGRAM, CORONARY ARTERY;  Surgeon: John Linn MD;  Location: [facility] CATH/EP LAB;  Service: Cardiology;  Laterality: N/A;    ECTROPION REPAIR Bilateral 3/13/2020    Procedure: REPAIR, ECTROPION, EYELID FLAPS - (Skin and/or Deep Tissue ;  Surgeon: Sarah Blackmon MD;  Location: [facility] OR [address] [place] [address];  Service: Ophthalmology;  Laterality: Bilateral;    LEFT HEART CATHETERIZATION Left 2022    Procedure: CATHETERIZATION, HEART, LEFT;  Surgeon: John Linn MD;  Location: [facility] CATH/EP LAB;  Service: Cardiology;  Laterality: Left;     Social History     Tobacco Use    Smoking status: Former     Packs/day: 0.25     Types: Cigarettes     Quit date: 1997     Years since quittin.2    Smokeless tobacco: Never   Substance Use Topics    Alcohol use: No    Drug use: No        Review of Systems     ROS        Objective:        Vitals:    22 1431   BP: 122/70   Pulse: 82       Lab Results   Component Value Date    WBC 8.78 2022    HGB 16.1 2022    HCT 46.6  02/04/2022     02/04/2022    CHOL 157 11/15/2021    TRIG 132 11/15/2021    HDL 40 11/15/2021    ALT 29 02/04/2022    AST 23 02/04/2022     02/04/2022    K 4.2 02/04/2022     02/04/2022    CREATININE 0.9 02/04/2022    BUN 16 02/04/2022    CO2 27 02/04/2022    TSH 2.229 03/01/2018    PSA 0.65 03/01/2018    INR 1.2 02/04/2022    GLUF 114 (H) 02/15/2017    HGBA1C 6.1 (H) 11/15/2021        ECHOCARDIOGRAM RESULTS  No results found for this or any previous visit.      CURRENT/PREVIOUS VISIT EKG  Results for orders placed or performed during the hospital encounter of 02/04/22   EKG 12-lead    Collection Time: 02/04/22 11:25 AM    Narrative    Test Reason : R93.1,Z01.810,    Vent. Rate : 089 BPM     Atrial Rate : 089 BPM     P-R Int : 158 ms          QRS Dur : 084 ms      QT Int : 354 ms       P-R-T Axes : 059 218 052 degrees     QTc Int : 430 ms    Normal sinus rhythm  Right superior axis deviation  Abnormal ECG  When compared with ECG of 03-JAN-2022 13:04,  No significant change was found  Confirmed by Jesus CARDOSO, Simeon CAIN ([id number]) on 2/16/2022 9:59:05 PM    Referred By:             Confirmed By:Simeon Lee MD     No results found for this or any previous visit.    Results for orders placed in visit on 12/20/21    Nuclear Stress Test    Interpretation Summary    The EKG portion of this study is negative for ischemia.    The patient reported no chest pain during the stress test.    There were no arrhythmias during stress.    The exercise capacity was above average.    The nuclear portion of this study will be reported separately.      PHYSICAL EXAM    Physical Exam 140/80   left  no bruit over subclavian   Lungs are clear  Cardiac regular  At no masses  Extremities normal    Medication List with Changes/Refills   Current Medications    AMLODIPINE (NORVASC) 2.5 MG TABLET    Take 1 tablet (2.5 mg total) by mouth once daily.    ASPIRIN (ECOTRIN) 81 MG EC TABLET    Take 81 mg by mouth every other day.     ATORVASTATIN (LIPITOR) 20 MG TABLET    TAKE 1 TABLET(20 MG) BY MOUTH EVERY DAY    CALCIUM ORAL    Take 1 tablet by mouth once daily.    CYANOCOBALAMIN, VITAMIN B-12, (VITAMIN B-12 ORAL)    Take 1 tablet by mouth once daily.    GLUCOSAM/CHOND-MSM1/C/TIM/BOR (GLUCOSAMINE-CHOND-MSM COMPLEX ORAL)    Take 1 tablet by mouth once daily.    MULTIVITAMIN WITH MINERALS/LUT (MULTIVITAMIN 50 PLUS ORAL)    Take 1 tablet by mouth once daily.     NITROGLYCERIN (NITROSTAT) 0.4 MG SL TABLET    Place 1 tablet (0.4 mg total) under the tongue every 5 (five) minutes as needed for Chest pain.    PANTOPRAZOLE (PROTONIX) 40 MG TABLET    Take 40 mg by mouth once daily.    VITAMIN A 74389 UNIT CAPSULE    Take 10,000 Units by mouth once daily.    VITAMIN C 500 MG TABLET        ZINC GLUCONATE 50 MG TABLET    Take 50 mg by mouth once daily.           Assessment:     Hyperchol 130  Carotid stenosi   Ascvd mild asymptomatic    No sob   No diagnosis found.     Plan:   Add asa 81 add zetia   Try to achieve LDL goal less than 70  Problem List Items Addressed This Visit    None       No follow-ups on file.

## 2022-09-23 ENCOUNTER — TELEPHONE (OUTPATIENT)
Dept: CARDIOLOGY | Facility: CLINIC | Age: 82
End: 2022-09-23
Payer: MEDICARE

## 2022-09-23 NOTE — TELEPHONE ENCOUNTER
----- Message from Kathi Sexton sent at 9/23/2022  9:26 AM CDT -----  Contact: wife  Patients saw you yesterday so his wife is calling to teodora his 6 mo f/u appt on 3/21/23 so call back at [phone] and thanks

## 2022-09-23 NOTE — TELEPHONE ENCOUNTER
Patient was informed about the 6 mo fu and she would receive a letter. Pt wife understood and would wait to receive it./

## 2022-09-23 NOTE — TELEPHONE ENCOUNTER
----- Message from Kathi Sexton sent at 2022  9:31 AM CDT -----  Contact: wife Genie Lee told him yesterday to do his lipid lab that he ordered in one month from  but when I went to schedule it  already so please put back in system and call back so Genie can get it scheduled at [phone]

## 2022-09-23 NOTE — TELEPHONE ENCOUNTER
Spoke with pt wife regarding labs. Did advise pt that orders are under active request and they are viewable to schedule. Pt wife understood.

## 2022-10-10 ENCOUNTER — IMMUNIZATION (OUTPATIENT)
Dept: PRIMARY CARE CLINIC | Facility: CLINIC | Age: 82
End: 2022-10-10
Payer: MEDICARE

## 2022-10-10 DIAGNOSIS — Z23 NEED FOR VACCINATION: Primary | ICD-10-CM

## 2022-10-10 PROCEDURE — 91312 COVID-19, MRNA, LNP-S, BIVALENT BOOSTER, PF, 30 MCG/0.3 ML DOSE: ICD-10-PCS | Mod: S$GLB,,, | Performed by: FAMILY MEDICINE

## 2022-10-10 PROCEDURE — 0124A COVID-19, MRNA, LNP-S, BIVALENT BOOSTER, PF, 30 MCG/0.3 ML DOSE: CPT | Mod: S$GLB,,, | Performed by: FAMILY MEDICINE

## 2022-10-10 PROCEDURE — 91312 COVID-19, MRNA, LNP-S, BIVALENT BOOSTER, PF, 30 MCG/0.3 ML DOSE: CPT | Mod: S$GLB,,, | Performed by: FAMILY MEDICINE

## 2022-10-10 PROCEDURE — 0124A COVID-19, MRNA, LNP-S, BIVALENT BOOSTER, PF, 30 MCG/0.3 ML DOSE: ICD-10-PCS | Mod: S$GLB,,, | Performed by: FAMILY MEDICINE

## 2022-10-18 ENCOUNTER — LAB VISIT (OUTPATIENT)
Dept: LAB | Facility: HOSPITAL | Age: 82
End: 2022-10-18
Attending: SPECIALIST
Payer: MEDICARE

## 2022-10-18 DIAGNOSIS — I25.10 ASCVD (ARTERIOSCLEROTIC CARDIOVASCULAR DISEASE): ICD-10-CM

## 2022-10-18 DIAGNOSIS — E78.00 HYPERCHOLESTEREMIA: ICD-10-CM

## 2022-10-18 LAB
CHOLEST SERPL-MCNC: 118 MG/DL (ref 120–199)
CHOLEST/HDLC SERPL: 3.2 {RATIO} (ref 2–5)
HDLC SERPL-MCNC: 37 MG/DL (ref 40–75)
HDLC SERPL: 31.4 % (ref 20–50)
LDLC SERPL CALC-MCNC: 61 MG/DL (ref 63–159)
NONHDLC SERPL-MCNC: 81 MG/DL
TRIGL SERPL-MCNC: 100 MG/DL (ref 30–150)

## 2022-10-18 PROCEDURE — 80061 LIPID PANEL: CPT | Performed by: SPECIALIST

## 2022-10-18 PROCEDURE — 36415 COLL VENOUS BLD VENIPUNCTURE: CPT | Mod: PO | Performed by: SPECIALIST

## 2022-10-29 ENCOUNTER — IMMUNIZATION (OUTPATIENT)
Dept: FAMILY MEDICINE | Facility: CLINIC | Age: 82
End: 2022-10-29
Payer: MEDICARE

## 2022-10-29 PROCEDURE — 90694 VACC AIIV4 NO PRSRV 0.5ML IM: CPT | Mod: S$GLB,,, | Performed by: FAMILY MEDICINE

## 2022-10-29 PROCEDURE — 90694 FLU VACCINE - QUADRIVALENT - ADJUVANTED: ICD-10-PCS | Mod: S$GLB,,, | Performed by: FAMILY MEDICINE

## 2022-10-29 PROCEDURE — G0008 FLU VACCINE - QUADRIVALENT - ADJUVANTED: ICD-10-PCS | Mod: S$GLB,,, | Performed by: FAMILY MEDICINE

## 2022-10-29 PROCEDURE — G0008 ADMIN INFLUENZA VIRUS VAC: HCPCS | Mod: S$GLB,,, | Performed by: FAMILY MEDICINE

## 2022-11-11 ENCOUNTER — TELEPHONE (OUTPATIENT)
Dept: CARDIOLOGY | Facility: CLINIC | Age: 82
End: 2022-11-11
Payer: MEDICARE

## 2022-11-11 ENCOUNTER — OFFICE VISIT (OUTPATIENT)
Dept: FAMILY MEDICINE | Facility: CLINIC | Age: 82
End: 2022-11-11
Payer: MEDICARE

## 2022-11-11 VITALS
SYSTOLIC BLOOD PRESSURE: 110 MMHG | TEMPERATURE: 98 F | WEIGHT: 181.44 LBS | HEART RATE: 79 BPM | DIASTOLIC BLOOD PRESSURE: 62 MMHG | RESPIRATION RATE: 17 BRPM | OXYGEN SATURATION: 98 % | HEIGHT: 71 IN | BODY MASS INDEX: 25.4 KG/M2

## 2022-11-11 DIAGNOSIS — I65.29 STENOSIS OF CAROTID ARTERY, UNSPECIFIED LATERALITY: ICD-10-CM

## 2022-11-11 DIAGNOSIS — E78.2 HYPERLIPIDEMIA, MIXED: Primary | ICD-10-CM

## 2022-11-11 DIAGNOSIS — D69.2 OTHER NONTHROMBOCYTOPENIC PURPURA: ICD-10-CM

## 2022-11-11 PROCEDURE — 3078F DIAST BP <80 MM HG: CPT | Mod: CPTII,S$GLB,, | Performed by: FAMILY MEDICINE

## 2022-11-11 PROCEDURE — 99999 PR PBB SHADOW E&M-EST. PATIENT-LVL V: ICD-10-PCS | Mod: PBBFAC,,, | Performed by: FAMILY MEDICINE

## 2022-11-11 PROCEDURE — 1101F PT FALLS ASSESS-DOCD LE1/YR: CPT | Mod: CPTII,S$GLB,, | Performed by: FAMILY MEDICINE

## 2022-11-11 PROCEDURE — 3074F PR MOST RECENT SYSTOLIC BLOOD PRESSURE < 130 MM HG: ICD-10-PCS | Mod: CPTII,S$GLB,, | Performed by: FAMILY MEDICINE

## 2022-11-11 PROCEDURE — 1126F PR PAIN SEVERITY QUANTIFIED, NO PAIN PRESENT: ICD-10-PCS | Mod: CPTII,S$GLB,, | Performed by: FAMILY MEDICINE

## 2022-11-11 PROCEDURE — 99213 OFFICE O/P EST LOW 20 MIN: CPT | Mod: S$GLB,,, | Performed by: FAMILY MEDICINE

## 2022-11-11 PROCEDURE — 99213 PR OFFICE/OUTPT VISIT, EST, LEVL III, 20-29 MIN: ICD-10-PCS | Mod: S$GLB,,, | Performed by: FAMILY MEDICINE

## 2022-11-11 PROCEDURE — 3074F SYST BP LT 130 MM HG: CPT | Mod: CPTII,S$GLB,, | Performed by: FAMILY MEDICINE

## 2022-11-11 PROCEDURE — 1159F MED LIST DOCD IN RCRD: CPT | Mod: CPTII,S$GLB,, | Performed by: FAMILY MEDICINE

## 2022-11-11 PROCEDURE — 1126F AMNT PAIN NOTED NONE PRSNT: CPT | Mod: CPTII,S$GLB,, | Performed by: FAMILY MEDICINE

## 2022-11-11 PROCEDURE — 99499 RISK ADDL DX/OHS AUDIT: ICD-10-PCS | Mod: S$GLB,,, | Performed by: FAMILY MEDICINE

## 2022-11-11 PROCEDURE — 3288F FALL RISK ASSESSMENT DOCD: CPT | Mod: CPTII,S$GLB,, | Performed by: FAMILY MEDICINE

## 2022-11-11 PROCEDURE — 3288F PR FALLS RISK ASSESSMENT DOCUMENTED: ICD-10-PCS | Mod: CPTII,S$GLB,, | Performed by: FAMILY MEDICINE

## 2022-11-11 PROCEDURE — 99499 UNLISTED E&M SERVICE: CPT | Mod: S$GLB,,, | Performed by: FAMILY MEDICINE

## 2022-11-11 PROCEDURE — 1159F PR MEDICATION LIST DOCUMENTED IN MEDICAL RECORD: ICD-10-PCS | Mod: CPTII,S$GLB,, | Performed by: FAMILY MEDICINE

## 2022-11-11 PROCEDURE — 99999 PR PBB SHADOW E&M-EST. PATIENT-LVL V: CPT | Mod: PBBFAC,,, | Performed by: FAMILY MEDICINE

## 2022-11-11 PROCEDURE — 1101F PR PT FALLS ASSESS DOC 0-1 FALLS W/OUT INJ PAST YR: ICD-10-PCS | Mod: CPTII,S$GLB,, | Performed by: FAMILY MEDICINE

## 2022-11-11 PROCEDURE — 3078F PR MOST RECENT DIASTOLIC BLOOD PRESSURE < 80 MM HG: ICD-10-PCS | Mod: CPTII,S$GLB,, | Performed by: FAMILY MEDICINE

## 2022-11-11 RX ORDER — ATORVASTATIN CALCIUM 20 MG/1
20 TABLET, FILM COATED ORAL DAILY
Qty: 90 TABLET | Refills: 3 | Status: SHIPPED | OUTPATIENT
Start: 2022-11-11 | End: 2022-12-09

## 2022-11-11 NOTE — TELEPHONE ENCOUNTER
----- Message from Nica Aaron sent at 11/11/2022  2:59 PM CST -----  Type: Needs Medical Advice  Who Called:  pt wife, Genie  Symptoms (please be specific):  said she need to speak to the office said she need to verify that he do not have an appt--I told pt I did not see one and she said have the nurse call and verify--please call and advise  Best Call Back Number: [phone] (home)     Additional Information: thank you

## 2022-11-11 NOTE — PATIENT INSTRUCTIONS
David Tapia,     If you are due for any health screening(s) below please notify me so we can arrange them to be ordered and scheduled to maintain your health. Most healthy patients complete it. Don't lose out on improving your health.     All of your core healthy metrics are met.

## 2022-11-17 NOTE — PROGRESS NOTES
Subjective:   Patient ID: Willie Liao is a 82 y.o. male     Chief Complaint:Annual Exam      HPI  Review of Systems   Respiratory:  Negative for shortness of breath.    Cardiovascular:  Negative for chest pain.   Gastrointestinal:  Negative for abdominal pain.   Genitourinary:  Negative for dysuria.   Past Medical History:   Diagnosis Date    Carotid stenosis     Coronary artery disease     Gastritis     GERD (gastroesophageal reflux disease)     H. pylori infection     Hyperlipidemia     Shingles      Past Surgical History:   Procedure Laterality Date    arthroscope Left     knee arthroscopy, torn meniscus    COLONOSCOPY      Adrien    CORONARY ANGIOGRAPHY N/A 2/7/2022    Procedure: ANGIOGRAM, CORONARY ARTERY;  Surgeon: oJhn Linn MD;  Location: [facility] CATH/EP LAB;  Service: Cardiology;  Laterality: N/A;    ECTROPION REPAIR Bilateral 3/13/2020    Procedure: REPAIR, ECTROPION, EYELID FLAPS - (Skin and/or Deep Tissue ;  Surgeon: Sarah Blackmon MD;  Location: [facility] OR [address] [place] [address];  Service: Ophthalmology;  Laterality: Bilateral;    LEFT HEART CATHETERIZATION Left 2/7/2022    Procedure: CATHETERIZATION, HEART, LEFT;  Surgeon: John Linn MD;  Location: [facility] CATH/EP LAB;  Service: Cardiology;  Laterality: Left;     Objective:     Vitals:    11/11/22 0950   BP: 110/62   Pulse: 79   Resp: 17   Temp: 97.5 °F (36.4 °C)     Body mass index is 25.31 kg/m².  Physical Exam  Vitals and nursing note reviewed.   Constitutional:       Appearance: He is well-developed.   HENT:      Head: Normocephalic and atraumatic.   Eyes:      General: No scleral icterus.     Conjunctiva/sclera: Conjunctivae normal.   Cardiovascular:      Heart sounds: No murmur heard.  Pulmonary:      Effort: Pulmonary effort is normal. No respiratory distress.   Musculoskeletal:         General: No deformity. Normal range of motion.      Cervical back: Normal range of motion and neck supple.   Skin:     Coloration: Skin is not pale.       Findings: No rash.   Neurological:      Mental Status: He is alert and oriented to person, place, and time.   Psychiatric:         Behavior: Behavior normal.         Thought Content: Thought content normal.         Judgment: Judgment normal.   Assessment:     1. Hyperlipidemia, mixed    2. Stenosis of carotid artery, unspecified laterality    3. Other nonthrombocytopenic purpura      Plan:   Hyperlipidemia, mixed  -     atorvastatin (LIPITOR) 20 MG tablet; Take 1 tablet (20 mg total) by mouth once daily.  Dispense: 90 tablet; Refill: 3    Stenosis of carotid artery, unspecified laterality  Stable, follows with cartiology, on statin  Other nonthrombocytopenic purpura  Stable, review of bloodwork form 2/2022 shows normal platelets        Total time spent of Less than 30 minutes minutes on the day of the visit.This includes face to face time and preparing to see the patient, obtaining and reviewing separately obtained history, documenting clinical information in the electronic or other health record, independently interpreting results and communicating results to the patient/family/caregiver, or care coordinator.    Established patient with me has been instructed that must see me at least 1 time yearly (every 365 days) for refills of medications. Seeing other providers in this clinic is fine but expectation is to see me yearly.    Cooper Luz MD  11/17/2022    Portions of this note have been dictated with NATHEN Wall

## 2023-03-09 ENCOUNTER — PES CALL (OUTPATIENT)
Dept: ADMINISTRATIVE | Facility: CLINIC | Age: 83
End: 2023-03-09
Payer: MEDICARE

## 2023-03-16 ENCOUNTER — OFFICE VISIT (OUTPATIENT)
Dept: CARDIOLOGY | Facility: CLINIC | Age: 83
End: 2023-03-16
Payer: MEDICARE

## 2023-03-16 VITALS
HEART RATE: 77 BPM | WEIGHT: 182.75 LBS | SYSTOLIC BLOOD PRESSURE: 122 MMHG | HEIGHT: 71 IN | DIASTOLIC BLOOD PRESSURE: 62 MMHG | BODY MASS INDEX: 25.58 KG/M2 | OXYGEN SATURATION: 97 %

## 2023-03-16 DIAGNOSIS — M79.10 MUSCLE PAIN: ICD-10-CM

## 2023-03-16 DIAGNOSIS — N23 KIDNEY PAIN: ICD-10-CM

## 2023-03-16 DIAGNOSIS — E78.00 HYPERCHOLESTEREMIA: Primary | ICD-10-CM

## 2023-03-16 DIAGNOSIS — I65.22 STENOSIS OF LEFT CAROTID ARTERY: ICD-10-CM

## 2023-03-16 PROCEDURE — 1126F PR PAIN SEVERITY QUANTIFIED, NO PAIN PRESENT: ICD-10-PCS | Mod: HCNC,CPTII,S$GLB, | Performed by: SPECIALIST

## 2023-03-16 PROCEDURE — 1160F RVW MEDS BY RX/DR IN RCRD: CPT | Mod: HCNC,CPTII,S$GLB, | Performed by: SPECIALIST

## 2023-03-16 PROCEDURE — 3074F SYST BP LT 130 MM HG: CPT | Mod: HCNC,CPTII,S$GLB, | Performed by: SPECIALIST

## 2023-03-16 PROCEDURE — 1159F PR MEDICATION LIST DOCUMENTED IN MEDICAL RECORD: ICD-10-PCS | Mod: HCNC,CPTII,S$GLB, | Performed by: SPECIALIST

## 2023-03-16 PROCEDURE — 99999 PR PBB SHADOW E&M-EST. PATIENT-LVL IV: ICD-10-PCS | Mod: PBBFAC,HCNC,, | Performed by: SPECIALIST

## 2023-03-16 PROCEDURE — 99213 PR OFFICE/OUTPT VISIT, EST, LEVL III, 20-29 MIN: ICD-10-PCS | Mod: HCNC,S$GLB,, | Performed by: SPECIALIST

## 2023-03-16 PROCEDURE — 1101F PT FALLS ASSESS-DOCD LE1/YR: CPT | Mod: HCNC,CPTII,S$GLB, | Performed by: SPECIALIST

## 2023-03-16 PROCEDURE — 3288F FALL RISK ASSESSMENT DOCD: CPT | Mod: HCNC,CPTII,S$GLB, | Performed by: SPECIALIST

## 2023-03-16 PROCEDURE — 3074F PR MOST RECENT SYSTOLIC BLOOD PRESSURE < 130 MM HG: ICD-10-PCS | Mod: HCNC,CPTII,S$GLB, | Performed by: SPECIALIST

## 2023-03-16 PROCEDURE — 1101F PR PT FALLS ASSESS DOC 0-1 FALLS W/OUT INJ PAST YR: ICD-10-PCS | Mod: HCNC,CPTII,S$GLB, | Performed by: SPECIALIST

## 2023-03-16 PROCEDURE — 99213 OFFICE O/P EST LOW 20 MIN: CPT | Mod: HCNC,S$GLB,, | Performed by: SPECIALIST

## 2023-03-16 PROCEDURE — 3288F PR FALLS RISK ASSESSMENT DOCUMENTED: ICD-10-PCS | Mod: HCNC,CPTII,S$GLB, | Performed by: SPECIALIST

## 2023-03-16 PROCEDURE — 1160F PR REVIEW ALL MEDS BY PRESCRIBER/CLIN PHARMACIST DOCUMENTED: ICD-10-PCS | Mod: HCNC,CPTII,S$GLB, | Performed by: SPECIALIST

## 2023-03-16 PROCEDURE — 1159F MED LIST DOCD IN RCRD: CPT | Mod: HCNC,CPTII,S$GLB, | Performed by: SPECIALIST

## 2023-03-16 PROCEDURE — 1126F AMNT PAIN NOTED NONE PRSNT: CPT | Mod: HCNC,CPTII,S$GLB, | Performed by: SPECIALIST

## 2023-03-16 PROCEDURE — 99999 PR PBB SHADOW E&M-EST. PATIENT-LVL IV: CPT | Mod: PBBFAC,HCNC,, | Performed by: SPECIALIST

## 2023-03-16 PROCEDURE — 3078F PR MOST RECENT DIASTOLIC BLOOD PRESSURE < 80 MM HG: ICD-10-PCS | Mod: HCNC,CPTII,S$GLB, | Performed by: SPECIALIST

## 2023-03-16 PROCEDURE — 3078F DIAST BP <80 MM HG: CPT | Mod: HCNC,CPTII,S$GLB, | Performed by: SPECIALIST

## 2023-03-16 NOTE — PROGRESS NOTES
Subjective:    Patient ID:  Willie Liao is a 82 y.o. male who presents for   Coronary Artery Disease and Hyperlipidemia    HPI problem 1 patient is tolerating Zetia and statin and LDL is down-he does not have coronary disease but does have 50 69% left internal carotid active physically  2. Has some back pain feels like it is musculoskeletal  May get checkup with ultrasound and urinalysis    Review of patient's allergies indicates:   Allergen Reactions    No known drug allergies        Past Medical History:   Diagnosis Date    Carotid stenosis     Coronary artery disease     Gastritis     GERD (gastroesophageal reflux disease)     H. pylori infection     Hyperlipidemia     Shingles      Past Surgical History:   Procedure Laterality Date    arthroscope Left     knee arthroscopy, torn meniscus    COLONOSCOPY      Adrien    CORONARY ANGIOGRAPHY N/A 2022    Procedure: ANGIOGRAM, CORONARY ARTERY;  Surgeon: John Linn MD;  Location: [facility] CATH/EP LAB;  Service: Cardiology;  Laterality: N/A;    ECTROPION REPAIR Bilateral 3/13/2020    Procedure: REPAIR, ECTROPION, EYELID FLAPS - (Skin and/or Deep Tissue ;  Surgeon: Sarah Blackmon MD;  Location: [address];  Service: Ophthalmology;  Laterality: Bilateral;    LEFT HEART CATHETERIZATION Left 2022    Procedure: CATHETERIZATION, HEART, LEFT;  Surgeon: John Linn MD;  Location: [facility] CATH/EP LAB;  Service: Cardiology;  Laterality: Left;     Social History     Tobacco Use    Smoking status: Former     Packs/day: 0.25     Types: Cigarettes     Quit date: 1997     Years since quittin.7    Smokeless tobacco: Never   Substance Use Topics    Alcohol use: No    Drug use: No        Review of Systems     ROS        Objective:        Vitals:    23 0947   BP: 122/62   Pulse: 77       Lab Results   Component Value Date    WBC 8.78 2022    HGB 16.1 2022    HCT 46.6 2022     2022    CHOL 118 (L) 10/18/2022     TRIG 100 10/18/2022    HDL 37 (L) 10/18/2022    ALT 29 02/04/2022    AST 23 02/04/2022     02/04/2022    K 4.2 02/04/2022     02/04/2022    CREATININE 0.9 02/04/2022    BUN 16 02/04/2022    CO2 27 02/04/2022    TSH 2.229 03/01/2018    PSA 0.65 03/01/2018    INR 1.2 02/04/2022    GLUF 114 (H) 02/15/2017    HGBA1C 6.1 (H) 11/15/2021        ECHOCARDIOGRAM RESULTS  No results found for this or any previous visit.      CURRENT/PREVIOUS VISIT EKG  Results for orders placed or performed during the hospital encounter of 02/04/22   EKG 12-lead    Collection Time: 02/04/22 11:25 AM    Narrative    Test Reason : R93.1,Z01.810,    Vent. Rate : 089 BPM     Atrial Rate : 089 BPM     P-R Int : 158 ms          QRS Dur : 084 ms      QT Int : 354 ms       P-R-T Axes : 059 218 052 degrees     QTc Int : 430 ms    Normal sinus rhythm  Right superior axis deviation  Abnormal ECG  When compared with ECG of 03-JAN-2022 13:04,  No significant change was found  Confirmed by Jesus CARDOSO, Simeon CAIN ([id number]) on 2/16/2022 9:59:05 PM    Referred By:             Confirmed By:Simeon Lee MD     No results found for this or any previous visit.    Results for orders placed in visit on 12/20/21    Nuclear Stress Test    Interpretation Summary    The EKG portion of this study is negative for ischemia.    The patient reported no chest pain during the stress test.    There were no arrhythmias during stress.    The exercise capacity was above average.    The nuclear portion of this study will be reported separately.      PHYSICAL EXAM    Physical Exam well-developed male blood pressure 130/80  Question bruit in the left  Lungs are clear  Cardiac regular  Abdomen no mass    Medication List with Changes/Refills   Current Medications    AMLODIPINE (NORVASC) 2.5 MG TABLET    TAKE 1 TABLET(2.5 MG) BY MOUTH EVERY DAY    ASPIRIN (ECOTRIN) 81 MG EC TABLET    Take 81 mg by mouth every other day.    ATORVASTATIN (LIPITOR) 20 MG TABLET    TAKE 1 TABLET(20  MG) BY MOUTH EVERY DAY    CALCIUM ORAL    Take 1 tablet by mouth once daily.    CYANOCOBALAMIN, VITAMIN B-12, (VITAMIN B-12 ORAL)    Take 1 tablet by mouth once daily.    EZETIMIBE (ZETIA) 10 MG TABLET    Take 1 tablet (10 mg total) by mouth once daily.    GLUCOSAM/CHOND-MSM1/C/TIM/BOR (GLUCOSAMINE-CHOND-MSM COMPLEX ORAL)    Take 1 tablet by mouth once daily.    MULTIVITAMIN WITH MINERALS/LUT (MULTIVITAMIN 50 PLUS ORAL)    Take 1 tablet by mouth once daily.     NITROGLYCERIN (NITROSTAT) 0.4 MG SL TABLET    Place 1 tablet (0.4 mg total) under the tongue every 5 (five) minutes as needed for Chest pain.    PANTOPRAZOLE (PROTONIX) 40 MG TABLET    Take 40 mg by mouth once daily.    VITAMIN A 02939 UNIT CAPSULE    Take 10,000 Units by mouth once daily.    VITAMIN C 500 MG TABLET        ZINC GLUCONATE 50 MG TABLET    Take 50 mg by mouth once daily.           Assessment:       1. Hypercholesteremia    2. Kidney pain    3. Stenosis of left carotid artery    4. Muscle pain         Plan:   See pcp  re back  pain      Go asa qod because of carotid stenosis    Problem List Items Addressed This Visit          Cardiac/Vascular    Carotid arterial disease     Other Visit Diagnoses       Hypercholesteremia    -  Primary    Kidney pain        Relevant Orders    Urinalysis    Muscle pain                 No follow-ups on file.

## 2023-03-20 ENCOUNTER — OFFICE VISIT (OUTPATIENT)
Dept: DERMATOLOGY | Facility: CLINIC | Age: 83
End: 2023-03-20
Payer: MEDICARE

## 2023-03-20 VITALS — HEIGHT: 71 IN | WEIGHT: 182 LBS | BODY MASS INDEX: 25.48 KG/M2

## 2023-03-20 DIAGNOSIS — L82.0 INFLAMED SEBORRHEIC KERATOSIS: ICD-10-CM

## 2023-03-20 DIAGNOSIS — L57.8 OTHER SKIN CHANGES DUE TO CHRONIC EXPOSURE TO NONIONIZING RADIATION: ICD-10-CM

## 2023-03-20 DIAGNOSIS — D18.01 CHERRY ANGIOMA: ICD-10-CM

## 2023-03-20 DIAGNOSIS — D22.9 MULTIPLE BENIGN NEVI: ICD-10-CM

## 2023-03-20 DIAGNOSIS — L57.0 ACTINIC KERATOSES: Primary | ICD-10-CM

## 2023-03-20 PROCEDURE — 17110 DESTRUCTION B9 LES UP TO 14: CPT | Mod: HCNC,S$GLB,, | Performed by: DERMATOLOGY

## 2023-03-20 PROCEDURE — 99213 OFFICE O/P EST LOW 20 MIN: CPT | Mod: 25,HCNC,S$GLB, | Performed by: DERMATOLOGY

## 2023-03-20 PROCEDURE — 1160F PR REVIEW ALL MEDS BY PRESCRIBER/CLIN PHARMACIST DOCUMENTED: ICD-10-PCS | Mod: HCNC,CPTII,S$GLB, | Performed by: DERMATOLOGY

## 2023-03-20 PROCEDURE — 1160F RVW MEDS BY RX/DR IN RCRD: CPT | Mod: HCNC,CPTII,S$GLB, | Performed by: DERMATOLOGY

## 2023-03-20 PROCEDURE — 3288F PR FALLS RISK ASSESSMENT DOCUMENTED: ICD-10-PCS | Mod: HCNC,CPTII,S$GLB, | Performed by: DERMATOLOGY

## 2023-03-20 PROCEDURE — 17003 DESTRUCTION, PREMALIGNANT LESIONS; SECOND THROUGH 14 LESIONS: ICD-10-PCS | Mod: HCNC,XS,S$GLB, | Performed by: DERMATOLOGY

## 2023-03-20 PROCEDURE — 17110 PR DESTRUCTION BENIGN LESIONS UP TO 14: ICD-10-PCS | Mod: HCNC,S$GLB,, | Performed by: DERMATOLOGY

## 2023-03-20 PROCEDURE — 99999 PR PBB SHADOW E&M-EST. PATIENT-LVL III: ICD-10-PCS | Mod: PBBFAC,HCNC,, | Performed by: DERMATOLOGY

## 2023-03-20 PROCEDURE — 3288F FALL RISK ASSESSMENT DOCD: CPT | Mod: HCNC,CPTII,S$GLB, | Performed by: DERMATOLOGY

## 2023-03-20 PROCEDURE — 99213 PR OFFICE/OUTPT VISIT, EST, LEVL III, 20-29 MIN: ICD-10-PCS | Mod: 25,HCNC,S$GLB, | Performed by: DERMATOLOGY

## 2023-03-20 PROCEDURE — 17003 DESTRUCT PREMALG LES 2-14: CPT | Mod: HCNC,XS,S$GLB, | Performed by: DERMATOLOGY

## 2023-03-20 PROCEDURE — 1101F PT FALLS ASSESS-DOCD LE1/YR: CPT | Mod: HCNC,CPTII,S$GLB, | Performed by: DERMATOLOGY

## 2023-03-20 PROCEDURE — 1101F PR PT FALLS ASSESS DOC 0-1 FALLS W/OUT INJ PAST YR: ICD-10-PCS | Mod: HCNC,CPTII,S$GLB, | Performed by: DERMATOLOGY

## 2023-03-20 PROCEDURE — 17000 DESTRUCT PREMALG LESION: CPT | Mod: HCNC,XS,S$GLB, | Performed by: DERMATOLOGY

## 2023-03-20 PROCEDURE — 17000 PR DESTRUCTION(LASER SURGERY,CRYOSURGERY,CHEMOSURGERY),PREMALIGNANT LESIONS,FIRST LESION: ICD-10-PCS | Mod: HCNC,XS,S$GLB, | Performed by: DERMATOLOGY

## 2023-03-20 PROCEDURE — 1159F PR MEDICATION LIST DOCUMENTED IN MEDICAL RECORD: ICD-10-PCS | Mod: HCNC,CPTII,S$GLB, | Performed by: DERMATOLOGY

## 2023-03-20 PROCEDURE — 99999 PR PBB SHADOW E&M-EST. PATIENT-LVL III: CPT | Mod: PBBFAC,HCNC,, | Performed by: DERMATOLOGY

## 2023-03-20 PROCEDURE — 1159F MED LIST DOCD IN RCRD: CPT | Mod: HCNC,CPTII,S$GLB, | Performed by: DERMATOLOGY

## 2023-03-20 NOTE — PATIENT INSTRUCTIONS

## 2023-03-20 NOTE — PROGRESS NOTES
Subjective:       Patient ID:  Willie Liao is a 82 y.o. male who presents for   Chief Complaint   Patient presents with    Skin Check     UBSC    Spot     Lip, right elbow     LOV 11/18/21- AK, SK, NUB    Skin, left upper back, shave biopsy:   -SEBORRHEIC KERATOSIS, INFLAMED   This lesion is benign.     Patient here today for UBSC  C/O spot to lip and right elbow, no symptoms    Derm hx:   Ak's treated with cryo   8/16/2019 AK R helix   8/16/2019 AK R dorsal wrist     Current Outpatient Medications:   ·  amLODIPine (NORVASC) 2.5 MG tablet, TAKE 1 TABLET(2.5 MG) BY MOUTH EVERY DAY, Disp: 30 tablet, Rfl: 11  ·  aspirin (ECOTRIN) 81 MG EC tablet, Take 81 mg by mouth every other day., Disp: , Rfl:   ·  atorvastatin (LIPITOR) 20 MG tablet, TAKE 1 TABLET(20 MG) BY MOUTH EVERY DAY, Disp: 90 tablet, Rfl: 3  ·  CALCIUM ORAL, Take 1 tablet by mouth once daily., Disp: , Rfl:   ·  CYANOCOBALAMIN, VITAMIN B-12, (VITAMIN B-12 ORAL), Take 1 tablet by mouth once daily., Disp: , Rfl:   ·  ezetimibe (ZETIA) 10 mg tablet, Take 1 tablet (10 mg total) by mouth once daily., Disp: 90 tablet, Rfl: 3  ·  glucosam/chond-msm1/C/angely/bor (GLUCOSAMINE-CHOND-MSM COMPLEX ORAL), Take 1 tablet by mouth once daily., Disp: , Rfl:   ·  MULTIVITAMIN WITH MINERALS/LUT (MULTIVITAMIN 50 PLUS ORAL), Take 1 tablet by mouth once daily. , Disp: , Rfl:   ·  nitroGLYCERIN (NITROSTAT) 0.4 MG SL tablet, Place 1 tablet (0.4 mg total) under the tongue every 5 (five) minutes as needed for Chest pain., Disp: 25 tablet, Rfl: 6  ·  pantoprazole (PROTONIX) 40 MG tablet, Take 40 mg by mouth once daily., Disp: , Rfl:   ·  vitamin A 60997 UNIT capsule, Take 10,000 Units by mouth once daily., Disp: , Rfl:   ·  VITAMIN C 500 MG tablet, , Disp: , Rfl:   ·  zinc gluconate 50 mg tablet, Take 50 mg by mouth once daily., Disp: , Rfl:       Review of Systems   Constitutional:  Negative for fever, chills and fatigue.   Respiratory:  Negative for cough and shortness of  breath.    Skin:  Positive for activity-related sunscreen use and wears hat. Negative for rash and daily sunscreen use.      Objective:    Physical Exam   Constitutional: He appears well-developed and well-nourished. No distress.   Neurological: He is alert and oriented to person, place, and time. He is not disoriented.   Psychiatric: He has a normal mood and affect.   Skin:   Areas Examined (abnormalities noted in diagram):   Scalp / Hair Palpated and Inspected  Head / Face Inspection Performed  Neck Inspection Performed  Chest / Axilla Inspection Performed  Abdomen Inspection Performed  Back Inspection Performed  RUE Inspected  LUE Inspection Performed  RLE Inspected  LLE Inspection Performed  Nails and Digits Inspection Performed                     Diagram Legend     Erythematous scaling macule/papule c/w actinic keratosis       Vascular papule c/w angioma      Pigmented verrucoid papule/plaque c/w seborrheic keratosis      Yellow umbilicated papule c/w sebaceous hyperplasia      Irregularly shaped tan macule c/w lentigo     1-2 mm smooth white papules consistent with Milia      Movable subcutaneous cyst with punctum c/w epidermal inclusion cyst      Subcutaneous movable cyst c/w pilar cyst      Firm pink to brown papule c/w dermatofibroma      Pedunculated fleshy papule(s) c/w skin tag(s)      Evenly pigmented macule c/w junctional nevus     Mildly variegated pigmented, slightly irregular-bordered macule c/w mildly atypical nevus      Flesh colored to evenly pigmented papule c/w intradermal nevus       Pink pearly papule/plaque c/w basal cell carcinoma      Erythematous hyperkeratotic cursted plaque c/w SCC      Surgical scar with no sign of skin cancer recurrence      Open and closed comedones      Inflammatory papules and pustules      Verrucoid papule consistent consistent with wart     Erythematous eczematous patches and plaques     Dystrophic onycholytic nail with subungual debris c/w onychomycosis      Umbilicated papule    Erythematous-base heme-crusted tan verrucoid plaque consistent with inflamed seborrheic keratosis     Erythematous Silvery Scaling Plaque c/w Psoriasis     See annotation      Assessment / Plan:        Actinic keratoses  Cryosurgery Procedure Note    Verbal consent from the patient is obtained and the patient is aware of the precancerous quality and need for treatment of these lesions. Liquid nitrogen cryosurgery is applied to the 4 actinic keratoses, as detailed in the physical exam, to produce a freeze injury. The patient is aware that blisters may form and is instructed on wound care with gentle cleansing and use of vaseline ointment to keep moist until healed. The patient is supplied a handout on cryosurgery and is instructed to call if lesions do not completely resolve.    May need field treatment to lower vermillion border if fail to resolve with cryo    Inflamed seborrheic keratosis, back and R forearm  Cryosurgery procedure note:    Verbal consent from the patient is obtained. Liquid nitrogen cryosurgery is applied to 2 lesions to produce a freeze injury. The patient is aware that blisters may form and is instructed on wound care with gentle cleansing and use of vaseline ointment to keep moist until healed. The patient is supplied a handout on cryosurgery and is instructed to call if lesions do not completely resolve.    Multiple benign nevi  Careful dermoscopy evaluation of nevi performed with none identified as needing biopsy today  Monitor for new mole or moles that are becoming bigger, darker, irritated, or developing irregular borders.     Cherry angioma  This is a benign vascular lesion. Reassurance given. No treatment required.     Actinic skin damage  Patient instructed in importance in daily broad spectrum sun protection of at least spf 30. Mineral sunscreen ingredients preferred (Zinc +/- Titanium) and can be found OTC.   Recommend Elta MD for daily use on face and  neck.  Patient encouraged to wear hat for all outdoor exposure.   Also discussed sun avoidance and use of protective clothing.             Follow up in about 3 months (around 6/20/2023).

## 2023-05-29 ENCOUNTER — TELEPHONE (OUTPATIENT)
Dept: ADMINISTRATIVE | Facility: CLINIC | Age: 83
End: 2023-05-29
Payer: MEDICARE

## 2023-05-30 ENCOUNTER — OFFICE VISIT (OUTPATIENT)
Dept: FAMILY MEDICINE | Facility: CLINIC | Age: 83
End: 2023-05-30
Payer: MEDICARE

## 2023-05-30 VITALS
WEIGHT: 181.69 LBS | TEMPERATURE: 98 F | HEIGHT: 71 IN | BODY MASS INDEX: 25.44 KG/M2 | HEART RATE: 70 BPM | DIASTOLIC BLOOD PRESSURE: 72 MMHG | OXYGEN SATURATION: 96 % | SYSTOLIC BLOOD PRESSURE: 136 MMHG

## 2023-05-30 DIAGNOSIS — Z00.00 ENCOUNTER FOR PREVENTIVE HEALTH EXAMINATION: Primary | ICD-10-CM

## 2023-05-30 DIAGNOSIS — I70.0 AORTIC ATHEROSCLEROSIS: ICD-10-CM

## 2023-05-30 DIAGNOSIS — I65.29 STENOSIS OF CAROTID ARTERY, UNSPECIFIED LATERALITY: ICD-10-CM

## 2023-05-30 PROCEDURE — G0439 PR MEDICARE ANNUAL WELLNESS SUBSEQUENT VISIT: ICD-10-PCS | Mod: S$GLB,,, | Performed by: NURSE PRACTITIONER

## 2023-05-30 PROCEDURE — 1159F PR MEDICATION LIST DOCUMENTED IN MEDICAL RECORD: ICD-10-PCS | Mod: CPTII,S$GLB,, | Performed by: NURSE PRACTITIONER

## 2023-05-30 PROCEDURE — 1160F PR REVIEW ALL MEDS BY PRESCRIBER/CLIN PHARMACIST DOCUMENTED: ICD-10-PCS | Mod: CPTII,S$GLB,, | Performed by: NURSE PRACTITIONER

## 2023-05-30 PROCEDURE — 3288F FALL RISK ASSESSMENT DOCD: CPT | Mod: CPTII,S$GLB,, | Performed by: NURSE PRACTITIONER

## 2023-05-30 PROCEDURE — G0439 PPPS, SUBSEQ VISIT: HCPCS | Mod: S$GLB,,, | Performed by: NURSE PRACTITIONER

## 2023-05-30 PROCEDURE — 3078F PR MOST RECENT DIASTOLIC BLOOD PRESSURE < 80 MM HG: ICD-10-PCS | Mod: CPTII,S$GLB,, | Performed by: NURSE PRACTITIONER

## 2023-05-30 PROCEDURE — 99999 PR PBB SHADOW E&M-EST. PATIENT-LVL IV: ICD-10-PCS | Mod: PBBFAC,,, | Performed by: NURSE PRACTITIONER

## 2023-05-30 PROCEDURE — 3288F PR FALLS RISK ASSESSMENT DOCUMENTED: ICD-10-PCS | Mod: CPTII,S$GLB,, | Performed by: NURSE PRACTITIONER

## 2023-05-30 PROCEDURE — 1170F PR FUNCTIONAL STATUS ASSESSED: ICD-10-PCS | Mod: CPTII,S$GLB,, | Performed by: NURSE PRACTITIONER

## 2023-05-30 PROCEDURE — 3078F DIAST BP <80 MM HG: CPT | Mod: CPTII,S$GLB,, | Performed by: NURSE PRACTITIONER

## 2023-05-30 PROCEDURE — 1101F PT FALLS ASSESS-DOCD LE1/YR: CPT | Mod: CPTII,S$GLB,, | Performed by: NURSE PRACTITIONER

## 2023-05-30 PROCEDURE — 1126F PR PAIN SEVERITY QUANTIFIED, NO PAIN PRESENT: ICD-10-PCS | Mod: CPTII,S$GLB,, | Performed by: NURSE PRACTITIONER

## 2023-05-30 PROCEDURE — 1126F AMNT PAIN NOTED NONE PRSNT: CPT | Mod: CPTII,S$GLB,, | Performed by: NURSE PRACTITIONER

## 2023-05-30 PROCEDURE — 99999 PR PBB SHADOW E&M-EST. PATIENT-LVL IV: CPT | Mod: PBBFAC,,, | Performed by: NURSE PRACTITIONER

## 2023-05-30 PROCEDURE — 1101F PR PT FALLS ASSESS DOC 0-1 FALLS W/OUT INJ PAST YR: ICD-10-PCS | Mod: CPTII,S$GLB,, | Performed by: NURSE PRACTITIONER

## 2023-05-30 PROCEDURE — 3075F PR MOST RECENT SYSTOLIC BLOOD PRESS GE 130-139MM HG: ICD-10-PCS | Mod: CPTII,S$GLB,, | Performed by: NURSE PRACTITIONER

## 2023-05-30 PROCEDURE — 1159F MED LIST DOCD IN RCRD: CPT | Mod: CPTII,S$GLB,, | Performed by: NURSE PRACTITIONER

## 2023-05-30 PROCEDURE — 3075F SYST BP GE 130 - 139MM HG: CPT | Mod: CPTII,S$GLB,, | Performed by: NURSE PRACTITIONER

## 2023-05-30 PROCEDURE — 1170F FXNL STATUS ASSESSED: CPT | Mod: CPTII,S$GLB,, | Performed by: NURSE PRACTITIONER

## 2023-05-30 PROCEDURE — 1160F RVW MEDS BY RX/DR IN RCRD: CPT | Mod: CPTII,S$GLB,, | Performed by: NURSE PRACTITIONER

## 2023-05-30 RX ORDER — AMOXICILLIN 500 MG
CAPSULE ORAL DAILY
COMMUNITY

## 2023-05-30 NOTE — PATIENT INSTRUCTIONS
Counseling and Referral of Other Preventative  (Italic type indicates deductible and co-insurance are waived)    Patient Name: Willie Liao  Today's Date: 5/30/2023    Health Maintenance       Date Due Completion Date    Shingles Vaccine (1 of 2) Never done ---    TETANUS VACCINE 09/07/2021 9/7/2011    COVID-19 Vaccine (4 - Pfizer series) 02/10/2023 10/10/2022    Lipid Panel 10/18/2023 10/18/2022    Aspirin/Antiplatelet Therapy 05/30/2024 5/30/2023        No orders of the defined types were placed in this encounter.      The following information is provided to all patients.  This information is to help you find resources for any of the problems found today that may be affecting your health:                Living healthy guide: www.[facility].[place].gov      Understanding Diabetes: www.diabetes.org      Eating healthy: www.cdc.gov/healthyweight      [facility] home safety checklist: www.cdc.gov/steadi/patient.html      Agency on Aging: www.goea.[place].[facility]      Alcoholics anonymous (AA): www.aa.org      Physical Activity: www.jannette.nih.gov/rp6tvuf      Tobacco use: www.quitwithusla.org      Otolaryngologist Procedure Text (A): After obtaining clear surgical margins the patient was sent to otolaryngology for surgical repair.  The patient understands they will receive post-surgical care and follow-up from the referring physician's office.

## 2023-05-30 NOTE — PROGRESS NOTES
"  Willie Liao presented for a  Medicare AWV and comprehensive Health Risk Assessment today. The following components were reviewed and updated:    Medical history  Family History  Social history  Allergies and Current Medications  Health Risk Assessment  Health Maintenance  Care Team         ** See Completed Assessments for Annual Wellness Visit within the encounter summary.**         The following assessments were completed:  Living Situation  CAGE  Depression Screening  Timed Get Up and Go  Whisper Test  Cognitive Function Screening  Nutrition Screening  ADL Screening  PAQ Screening    Clock in media    Vitals:    05/30/23 1535   BP: 136/72   Pulse: 70   Temp: 98 °F (36.7 °C)   TempSrc: Oral   SpO2: 96%   Weight: 82.4 kg (181 lb 10.5 oz)   Height: 5' 11" (1.803 m)     Body mass index is 25.34 kg/m².  Physical Exam  Constitutional:       Appearance: He is well-developed.   HENT:      Head: Normocephalic and atraumatic.      Right Ear: Hearing normal.      Left Ear: Hearing normal.      Nose: Nose normal.   Eyes:      General: Lids are normal.      Conjunctiva/sclera: Conjunctivae normal.      Pupils: Pupils are equal, round, and reactive to light.   Cardiovascular:      Rate and Rhythm: Normal rate.   Pulmonary:      Effort: Pulmonary effort is normal.   Abdominal:      Palpations: Abdomen is soft.   Musculoskeletal:         General: Normal range of motion.      Cervical back: Normal range of motion and neck supple.   Skin:     General: Skin is warm and dry.   Neurological:      Mental Status: He is alert and oriented to person, place, and time.             Diagnoses and health risks identified today and associated recommendations/orders:    Encounter for preventive health examination  Discussed health maintenance guidelines appropriate for age.    Review for Opioid Screening: Patient does not have rx for Opioids.    Review for Substance Use Disorders: Patient does not use substance.    Stenosis of carotid " artery, unspecified laterality  Stable, continue care and monitoring per cardiology, last imaging 2022  Aortic atherosclerosis  Stable, continue care and follow up with cardiology           Provided Willie with a 5-10 year written screening schedule and personal prevention plan. Recommendations were developed using the USPSTF age appropriate recommendations. Education, counseling, and referrals were provided as needed. After Visit Summary printed and given to patient which includes a list of additional screenings\tests needed.    Follow up for One year for Annual Wellness Visit.    Benita Lawson NP    I offered to discuss advanced care planning, including how to pick a person who would make decisions for you if you were unable to make them for yourself, called a health care power of , and what kind of decisions you might make such as use of life sustaining treatments such as ventilators and tube feeding when faced with a life limiting illness recorded on a living will that they will need to know. (How you want to be cared for as you near the end of your natural life)     X Patient is interested in learning more about how to make advanced directives.  I provided them paperwork and offered to discuss this with them.

## 2023-06-20 ENCOUNTER — TELEPHONE (OUTPATIENT)
Dept: FAMILY MEDICINE | Facility: CLINIC | Age: 83
End: 2023-06-20
Payer: MEDICARE

## 2023-06-20 NOTE — TELEPHONE ENCOUNTER
Called and spoke to patient, states he has been having a pain in his right side when he twists, and also has been losing weight. Requesting appointment for evaluation, appointment scheduled, patient verbalized understanding.

## 2023-06-20 NOTE — TELEPHONE ENCOUNTER
----- Message from Tonya Holley sent at 6/20/2023 10:36 AM CDT -----  Contact: Patient  Type:  Sooner Appointment Request    Caller is requesting a sooner appointment.  Caller declined first available appointment listed below.  Caller will not accept being placed on the waitlist and is requesting a message be sent to doctor.    Name of Caller:  Patient's wife  When is the first available appointment?  na  Symptoms:  lost weight and can't gain, pain in R side  Best Call Back Number:  [phone]  Additional Information:  Please call the pt's wife to schedule. Thanks!

## 2023-06-21 ENCOUNTER — LAB VISIT (OUTPATIENT)
Dept: LAB | Facility: HOSPITAL | Age: 83
End: 2023-06-21
Attending: FAMILY MEDICINE
Payer: MEDICARE

## 2023-06-21 ENCOUNTER — OFFICE VISIT (OUTPATIENT)
Dept: FAMILY MEDICINE | Facility: CLINIC | Age: 83
End: 2023-06-21
Payer: MEDICARE

## 2023-06-21 VITALS
WEIGHT: 182.13 LBS | RESPIRATION RATE: 18 BRPM | OXYGEN SATURATION: 95 % | DIASTOLIC BLOOD PRESSURE: 62 MMHG | HEART RATE: 90 BPM | BODY MASS INDEX: 25.5 KG/M2 | SYSTOLIC BLOOD PRESSURE: 130 MMHG | HEIGHT: 71 IN | TEMPERATURE: 98 F

## 2023-06-21 DIAGNOSIS — R10.9 RIGHT FLANK PAIN: Primary | ICD-10-CM

## 2023-06-21 DIAGNOSIS — R10.9 RIGHT FLANK PAIN: ICD-10-CM

## 2023-06-21 LAB
BILIRUB UR QL STRIP: NEGATIVE
CLARITY UR REFRACT.AUTO: CLEAR
COLOR UR AUTO: YELLOW
GLUCOSE UR QL STRIP: NEGATIVE
HGB UR QL STRIP: NEGATIVE
KETONES UR QL STRIP: NEGATIVE
LEUKOCYTE ESTERASE UR QL STRIP: NEGATIVE
NITRITE UR QL STRIP: NEGATIVE
PH UR STRIP: 6 [PH] (ref 5–8)
PROT UR QL STRIP: ABNORMAL
SP GR UR STRIP: 1.02 (ref 1–1.03)
URN SPEC COLLECT METH UR: ABNORMAL

## 2023-06-21 PROCEDURE — 3075F SYST BP GE 130 - 139MM HG: CPT | Mod: CPTII,S$GLB,, | Performed by: FAMILY MEDICINE

## 2023-06-21 PROCEDURE — 1126F AMNT PAIN NOTED NONE PRSNT: CPT | Mod: CPTII,S$GLB,, | Performed by: FAMILY MEDICINE

## 2023-06-21 PROCEDURE — 99214 OFFICE O/P EST MOD 30 MIN: CPT | Mod: S$GLB,,, | Performed by: FAMILY MEDICINE

## 2023-06-21 PROCEDURE — 99999 PR PBB SHADOW E&M-EST. PATIENT-LVL IV: CPT | Mod: PBBFAC,,, | Performed by: FAMILY MEDICINE

## 2023-06-21 PROCEDURE — 99214 PR OFFICE/OUTPT VISIT, EST, LEVL IV, 30-39 MIN: ICD-10-PCS | Mod: S$GLB,,, | Performed by: FAMILY MEDICINE

## 2023-06-21 PROCEDURE — 3075F PR MOST RECENT SYSTOLIC BLOOD PRESS GE 130-139MM HG: ICD-10-PCS | Mod: CPTII,S$GLB,, | Performed by: FAMILY MEDICINE

## 2023-06-21 PROCEDURE — 1126F PR PAIN SEVERITY QUANTIFIED, NO PAIN PRESENT: ICD-10-PCS | Mod: CPTII,S$GLB,, | Performed by: FAMILY MEDICINE

## 2023-06-21 PROCEDURE — 3078F DIAST BP <80 MM HG: CPT | Mod: CPTII,S$GLB,, | Performed by: FAMILY MEDICINE

## 2023-06-21 PROCEDURE — 99999 PR PBB SHADOW E&M-EST. PATIENT-LVL IV: ICD-10-PCS | Mod: PBBFAC,,, | Performed by: FAMILY MEDICINE

## 2023-06-21 PROCEDURE — 1159F PR MEDICATION LIST DOCUMENTED IN MEDICAL RECORD: ICD-10-PCS | Mod: CPTII,S$GLB,, | Performed by: FAMILY MEDICINE

## 2023-06-21 PROCEDURE — 1159F MED LIST DOCD IN RCRD: CPT | Mod: CPTII,S$GLB,, | Performed by: FAMILY MEDICINE

## 2023-06-21 PROCEDURE — 1101F PR PT FALLS ASSESS DOC 0-1 FALLS W/OUT INJ PAST YR: ICD-10-PCS | Mod: CPTII,S$GLB,, | Performed by: FAMILY MEDICINE

## 2023-06-21 PROCEDURE — 1101F PT FALLS ASSESS-DOCD LE1/YR: CPT | Mod: CPTII,S$GLB,, | Performed by: FAMILY MEDICINE

## 2023-06-21 PROCEDURE — 3078F PR MOST RECENT DIASTOLIC BLOOD PRESSURE < 80 MM HG: ICD-10-PCS | Mod: CPTII,S$GLB,, | Performed by: FAMILY MEDICINE

## 2023-06-21 PROCEDURE — 3288F PR FALLS RISK ASSESSMENT DOCUMENTED: ICD-10-PCS | Mod: CPTII,S$GLB,, | Performed by: FAMILY MEDICINE

## 2023-06-21 PROCEDURE — 81003 URINALYSIS AUTO W/O SCOPE: CPT | Performed by: FAMILY MEDICINE

## 2023-06-21 PROCEDURE — 3288F FALL RISK ASSESSMENT DOCD: CPT | Mod: CPTII,S$GLB,, | Performed by: FAMILY MEDICINE

## 2023-06-21 NOTE — PROGRESS NOTES
Subjective:   Patient ID: Willie Liao is a 82 y.o. male     Chief Complaint:Flank Pain and Weight Loss       Right flank pain for couple months. Being seen for it today because reports weight loss. Review of current weight actually shows weight gain relative to last visit.    Flank Pain  Pertinent negatives include no abdominal pain, chest pain, dysuria, fever, headaches or weakness. Review of Systems   Constitutional:  Negative for chills and fever.   HENT:  Negative for sore throat and trouble swallowing.    Respiratory:  Negative for cough and shortness of breath.    Cardiovascular:  Negative for chest pain and leg swelling.   Gastrointestinal:  Negative for abdominal distention and abdominal pain.   Genitourinary:  Positive for flank pain. Negative for dysuria.   Musculoskeletal:  Negative for arthralgias and back pain.   Skin:  Negative for color change and pallor.   Neurological:  Negative for weakness and headaches.   Psychiatric/Behavioral:  Negative for agitation and confusion.    Past Medical History:   Diagnosis Date    Carotid stenosis     Coronary artery disease     Gastritis     GERD (gastroesophageal reflux disease)     H. pylori infection     Hyperlipidemia     Shingles      Past Surgical History:   Procedure Laterality Date    arthroscope Left     knee arthroscopy, torn meniscus    COLONOSCOPY      Adrien    CORONARY ANGIOGRAPHY N/A 2/7/2022    Procedure: ANGIOGRAM, CORONARY ARTERY;  Surgeon: John Linn MD;  Location: [facility] CATH/EP LAB;  Service: Cardiology;  Laterality: N/A;    ECTROPION REPAIR Bilateral 3/13/2020    Procedure: REPAIR, ECTROPION, EYELID FLAPS - (Skin and/or Deep Tissue ;  Surgeon: Sarah Blackmon MD;  Location: [facility] OR [address] [place] [address];  Service: Ophthalmology;  Laterality: Bilateral;    LEFT HEART CATHETERIZATION Left 2/7/2022    Procedure: CATHETERIZATION, HEART, LEFT;  Surgeon: John Linn MD;  Location: [facility] CATH/EP LAB;  Service: Cardiology;  Laterality:  Left;     Objective:     Vitals:    06/21/23 1345   BP: 130/62   Pulse: 90   Resp: 18   Temp: 98.1 °F (36.7 °C)     Body mass index is 25.4 kg/m².  Physical Exam  Vitals and nursing note reviewed.   Constitutional:       Appearance: He is well-developed.   HENT:      Head: Normocephalic and atraumatic.   Eyes:      General: No scleral icterus.     Conjunctiva/sclera: Conjunctivae normal.   Cardiovascular:      Heart sounds: No murmur heard.  Pulmonary:      Effort: Pulmonary effort is normal. No respiratory distress.   Musculoskeletal:         General: No deformity. Normal range of motion.      Cervical back: Normal range of motion and neck supple.   Skin:     Coloration: Skin is not pale.      Findings: No rash.   Neurological:      Mental Status: He is alert and oriented to person, place, and time.   Psychiatric:         Behavior: Behavior normal.         Thought Content: Thought content normal.         Judgment: Judgment normal.     Assessment:     1. Right flank pain      Plan:   Right flank pain  -     Comprehensive Metabolic Panel; Future; Expected date: 06/21/2023  -     CBC Auto Differential; Future; Expected date: 06/21/2023  -     Urinalysis, Reflex to Urine Culture Urine, Clean Catch; Future; Expected date: 06/21/2023  -     US Retroperitoneal Complete; Future; Expected date: 06/21/2023        Total time spent of Greater than 30 minutes minutes on the day of the visit.This includes face to face time and preparing to see the patient, obtaining and reviewing separately obtained history, documenting clinical information in the electronic or other health record, independently interpreting results and communicating results to the patient/family/caregiver, or care coordinator.    Established patient with me has been instructed that must see me at least 1 time yearly (every 365 days) for refills of medications. Seeing other providers in this clinic is fine but expectation is to see me yearly.    Cooper Luz,  MD  06/21/2023    Portions of this note have been dictated with NATHEN Garcia.

## 2023-06-22 ENCOUNTER — HOSPITAL ENCOUNTER (OUTPATIENT)
Dept: RADIOLOGY | Facility: HOSPITAL | Age: 83
Discharge: HOME | End: 2023-06-22
Attending: FAMILY MEDICINE
Payer: MEDICARE

## 2023-06-22 DIAGNOSIS — R10.9 RIGHT FLANK PAIN: ICD-10-CM

## 2023-06-22 PROCEDURE — 76770 US RETROPERITONEAL COMPLETE: ICD-10-PCS | Mod: 26,,, | Performed by: RADIOLOGY

## 2023-06-22 PROCEDURE — 76770 US EXAM ABDO BACK WALL COMP: CPT | Mod: TC

## 2023-06-22 PROCEDURE — 76770 US EXAM ABDO BACK WALL COMP: CPT | Mod: 26,,, | Performed by: RADIOLOGY

## 2023-06-23 ENCOUNTER — TELEPHONE (OUTPATIENT)
Dept: FAMILY MEDICINE | Facility: CLINIC | Age: 83
End: 2023-06-23
Payer: MEDICARE

## 2023-06-23 DIAGNOSIS — N28.1 RENAL CYST: Primary | ICD-10-CM

## 2023-06-23 NOTE — TELEPHONE ENCOUNTER
----- Message from Graciela Leggett MA sent at 6/23/2023  2:33 PM CDT -----  Contact: self  Type:  Test Results    Who Called:  pt  Name of Test (Lab/Mammo/Etc):  lab  Date of Test:  6/21/23  Ordering Provider:  Sukh  Where the test was performed:  [facility]  Best Call Back Number:  [phone]    Additional Information:  please return pt call to go over lab pt was not home when Mechelle called the first time

## 2023-06-26 ENCOUNTER — TELEPHONE (OUTPATIENT)
Dept: FAMILY MEDICINE | Facility: CLINIC | Age: 83
End: 2023-06-26
Payer: MEDICARE

## 2023-06-26 NOTE — TELEPHONE ENCOUNTER
Returned patients call, advised of results and referral. Patient advised of results. Verbalized understanding.

## 2023-06-26 NOTE — TELEPHONE ENCOUNTER
----- Message from Smiley Naylor sent at 6/26/2023  1:05 PM CDT -----  Contact: Wife  Type: Needs Medical Advice  Who Called:  Genie/Wife    Best Call Back Number: [phone]    Additional Information: States she would like to speak with office regarding referral.Please call back

## 2023-06-27 ENCOUNTER — OFFICE VISIT (OUTPATIENT)
Dept: DERMATOLOGY | Facility: CLINIC | Age: 83
End: 2023-06-27
Payer: MEDICARE

## 2023-06-27 VITALS — HEIGHT: 71 IN | WEIGHT: 182.13 LBS | BODY MASS INDEX: 25.5 KG/M2

## 2023-06-27 DIAGNOSIS — B07.9 VERRUCA VULGARIS: ICD-10-CM

## 2023-06-27 DIAGNOSIS — L57.8 OTHER SKIN CHANGES DUE TO CHRONIC EXPOSURE TO NONIONIZING RADIATION: ICD-10-CM

## 2023-06-27 DIAGNOSIS — D18.01 CHERRY ANGIOMA: ICD-10-CM

## 2023-06-27 DIAGNOSIS — L57.0 ACTINIC KERATOSES: Primary | ICD-10-CM

## 2023-06-27 DIAGNOSIS — D22.9 MULTIPLE BENIGN NEVI: ICD-10-CM

## 2023-06-27 DIAGNOSIS — L82.1 SEBORRHEIC KERATOSES: ICD-10-CM

## 2023-06-27 DIAGNOSIS — L81.4 SOLAR LENTIGO: ICD-10-CM

## 2023-06-27 PROCEDURE — 17003 DESTRUCTION, PREMALIGNANT LESIONS; SECOND THROUGH 14 LESIONS: ICD-10-PCS | Mod: XS,S$GLB,, | Performed by: DERMATOLOGY

## 2023-06-27 PROCEDURE — 3288F FALL RISK ASSESSMENT DOCD: CPT | Mod: CPTII,S$GLB,, | Performed by: DERMATOLOGY

## 2023-06-27 PROCEDURE — 1160F PR REVIEW ALL MEDS BY PRESCRIBER/CLIN PHARMACIST DOCUMENTED: ICD-10-PCS | Mod: CPTII,S$GLB,, | Performed by: DERMATOLOGY

## 2023-06-27 PROCEDURE — 17000 PR DESTRUCTION(LASER SURGERY,CRYOSURGERY,CHEMOSURGERY),PREMALIGNANT LESIONS,FIRST LESION: ICD-10-PCS | Mod: XS,S$GLB,, | Performed by: DERMATOLOGY

## 2023-06-27 PROCEDURE — 99213 OFFICE O/P EST LOW 20 MIN: CPT | Mod: 25,S$GLB,, | Performed by: DERMATOLOGY

## 2023-06-27 PROCEDURE — 99213 PR OFFICE/OUTPT VISIT, EST, LEVL III, 20-29 MIN: ICD-10-PCS | Mod: 25,S$GLB,, | Performed by: DERMATOLOGY

## 2023-06-27 PROCEDURE — 1101F PR PT FALLS ASSESS DOC 0-1 FALLS W/OUT INJ PAST YR: ICD-10-PCS | Mod: CPTII,S$GLB,, | Performed by: DERMATOLOGY

## 2023-06-27 PROCEDURE — 3288F PR FALLS RISK ASSESSMENT DOCUMENTED: ICD-10-PCS | Mod: CPTII,S$GLB,, | Performed by: DERMATOLOGY

## 2023-06-27 PROCEDURE — 17110 PR DESTRUCTION BENIGN LESIONS UP TO 14: ICD-10-PCS | Mod: S$GLB,,, | Performed by: DERMATOLOGY

## 2023-06-27 PROCEDURE — 17110 DESTRUCTION B9 LES UP TO 14: CPT | Mod: S$GLB,,, | Performed by: DERMATOLOGY

## 2023-06-27 PROCEDURE — 1101F PT FALLS ASSESS-DOCD LE1/YR: CPT | Mod: CPTII,S$GLB,, | Performed by: DERMATOLOGY

## 2023-06-27 PROCEDURE — 1159F PR MEDICATION LIST DOCUMENTED IN MEDICAL RECORD: ICD-10-PCS | Mod: CPTII,S$GLB,, | Performed by: DERMATOLOGY

## 2023-06-27 PROCEDURE — 17000 DESTRUCT PREMALG LESION: CPT | Mod: XS,S$GLB,, | Performed by: DERMATOLOGY

## 2023-06-27 PROCEDURE — 1159F MED LIST DOCD IN RCRD: CPT | Mod: CPTII,S$GLB,, | Performed by: DERMATOLOGY

## 2023-06-27 PROCEDURE — 17003 DESTRUCT PREMALG LES 2-14: CPT | Mod: XS,S$GLB,, | Performed by: DERMATOLOGY

## 2023-06-27 PROCEDURE — 1160F RVW MEDS BY RX/DR IN RCRD: CPT | Mod: CPTII,S$GLB,, | Performed by: DERMATOLOGY

## 2023-06-27 NOTE — PROGRESS NOTES
Subjective:      Patient ID:  Willie Liao is a 82 y.o. male who presents for   Chief Complaint   Patient presents with    Skin Check     UBSC     LOV 3/20/23 AK, inflamed SK    Patient here for UBSC  C/O spot under right breast, mole on left side  F/U lip cryo    Derm hx:   Ak's treated with cryo   8/16/2019 AK R helix   8/16/2019 AK R dorsal wrist     Current Outpatient Medications:   ·  amLODIPine (NORVASC) 2.5 MG tablet, TAKE 1 TABLET(2.5 MG) BY MOUTH EVERY DAY, Disp: 30 tablet, Rfl: 11  ·  aspirin (ECOTRIN) 81 MG EC tablet, Take 81 mg by mouth every other day., Disp: , Rfl:   ·  atorvastatin (LIPITOR) 20 MG tablet, TAKE 1 TABLET(20 MG) BY MOUTH EVERY DAY, Disp: 90 tablet, Rfl: 3  ·  CALCIUM ORAL, Take 1 tablet by mouth once daily., Disp: , Rfl:   ·  CYANOCOBALAMIN, VITAMIN B-12, (VITAMIN B-12 ORAL), Take 1 tablet by mouth once daily., Disp: , Rfl:   ·  ezetimibe (ZETIA) 10 mg tablet, Take 1 tablet (10 mg total) by mouth once daily., Disp: 90 tablet, Rfl: 3  ·  glucosam/chond-msm1/C/angely/bor (GLUCOSAMINE-CHOND-MSM COMPLEX ORAL), Take 1 tablet by mouth once daily., Disp: , Rfl:   ·  MULTIVITAMIN WITH MINERALS/LUT (MULTIVITAMIN 50 PLUS ORAL), Take 1 tablet by mouth once daily. , Disp: , Rfl:   ·  nitroGLYCERIN (NITROSTAT) 0.4 MG SL tablet, Place 1 tablet (0.4 mg total) under the tongue every 5 (five) minutes as needed for Chest pain., Disp: 25 tablet, Rfl: 6  ·  omega-3 fatty acids/fish oil (FISH OIL-OMEGA-3 FATTY ACIDS) 300-1,000 mg capsule, Take by mouth once daily., Disp: , Rfl:   ·  pantoprazole (PROTONIX) 40 MG tablet, Take 40 mg by mouth once daily., Disp: , Rfl:   ·  vitamin A 04165 UNIT capsule, Take 10,000 Units by mouth once daily., Disp: , Rfl:   ·  VITAMIN C 500 MG tablet, , Disp: , Rfl:   ·  zinc gluconate 50 mg tablet, Take 50 mg by mouth once daily., Disp: , Rfl:   No current facility-administered medications for this visit.    Facility-Administered Medications Ordered in Other Visits:   ·   tetracaine HCL 0.5% ophthalmic solution 1 drop, 1 drop, Both Eyes, Once, Peri Galeana MD       Review of Systems   Constitutional:  Negative for fever, chills and fatigue.   Respiratory:  Negative for cough and shortness of breath.    Skin:  Positive for activity-related sunscreen use and wears hat. Negative for rash and daily sunscreen use.     Objective:   Physical Exam   Constitutional: He appears well-developed and well-nourished. No distress.   Neurological: He is alert and oriented to person, place, and time. He is not disoriented.   Psychiatric: He has a normal mood and affect.   Skin:   Areas Examined (abnormalities noted in diagram):   Scalp / Hair Palpated and Inspected  Head / Face Inspection Performed  Neck Inspection Performed  Chest / Axilla Inspection Performed  Abdomen Inspection Performed  Back Inspection Performed  RUE Inspected  LUE Inspection Performed  Nails and Digits Inspection Performed                   Diagram Legend     Erythematous scaling macule/papule c/w actinic keratosis       Vascular papule c/w angioma      Pigmented verrucoid papule/plaque c/w seborrheic keratosis      Yellow umbilicated papule c/w sebaceous hyperplasia      Irregularly shaped tan macule c/w lentigo     1-2 mm smooth white papules consistent with Milia      Movable subcutaneous cyst with punctum c/w epidermal inclusion cyst      Subcutaneous movable cyst c/w pilar cyst      Firm pink to brown papule c/w dermatofibroma      Pedunculated fleshy papule(s) c/w skin tag(s)      Evenly pigmented macule c/w junctional nevus     Mildly variegated pigmented, slightly irregular-bordered macule c/w mildly atypical nevus      Flesh colored to evenly pigmented papule c/w intradermal nevus       Pink pearly papule/plaque c/w basal cell carcinoma      Erythematous hyperkeratotic cursted plaque c/w SCC      Surgical scar with no sign of skin cancer recurrence      Open and closed comedones      Inflammatory papules and pustules       Verrucoid papule consistent consistent with wart     Erythematous eczematous patches and plaques     Dystrophic onycholytic nail with subungual debris c/w onychomycosis     Umbilicated papule    Erythematous-base heme-crusted tan verrucoid plaque consistent with inflamed seborrheic keratosis     Erythematous Silvery Scaling Plaque c/w Psoriasis     See annotation      Assessment / Plan:        Actinic keratoses  Cryosurgery Procedure Note    Verbal consent from the patient is obtained and the patient is aware of the precancerous quality and need for treatment of these lesions. Liquid nitrogen cryosurgery is applied to the 6 actinic keratoses, as detailed in the physical exam, to produce a freeze injury. The patient is aware that blisters may form and is instructed on wound care with gentle cleansing and use of vaseline ointment to keep moist until healed. The patient is supplied a handout on cryosurgery and is instructed to call if lesions do not completely resolve.    Verruca vulgaris  Abdomen x 2  Cryosurgery procedure note:    Verbal consent from the patient is obtained. Liquid nitrogen cryosurgery is applied to 2 lesions to produce a freeze injury. The patient is aware that blisters may form and is instructed on wound care with gentle cleansing and use of vaseline ointment to keep moist until healed. The patient is supplied a handout on cryosurgery and is instructed to call if lesions do not completely resolve.    Multiple benign nevi  Careful dermoscopy evaluation of nevi performed with none identified as needing biopsy today  Monitor for new mole or moles that are becoming bigger, darker, irritated, or developing irregular borders.     Cherry angioma  This is a benign vascular lesion. Reassurance given. No treatment required.     Seborrheic keratoses  These are benign inherited growths without a malignant potential. Reassurance given to patient. No treatment is necessary.     Solar lentigo  This is a  benign hyperpigmented sun induced lesion. Daily sun protection will reduce the number of new lesions. Treatment of these benign lesions are considered cosmetic.    Other skin changes due to chronic exposure to nonionizing radiation  Patient instructed in importance in daily broad spectrum sun protection of at least spf 30. Mineral sunscreen ingredients preferred (Zinc +/- Titanium) and can be found OTC.   Recommend Elta MD for daily use on face and neck.  Patient encouraged to wear hat for all outdoor exposure.   Also discussed sun avoidance and use of protective clothing.             Follow up in about 1 year (around 6/27/2024), or if symptoms worsen or fail to improve.

## 2023-06-27 NOTE — PATIENT INSTRUCTIONS

## 2023-06-28 ENCOUNTER — TELEPHONE (OUTPATIENT)
Dept: UROLOGY | Facility: CLINIC | Age: 83
End: 2023-06-28
Payer: MEDICARE

## 2023-06-28 NOTE — TELEPHONE ENCOUNTER
Pt offered sooner appt for renal cyst, per scheduling protocol, with dixie  Pt accepted   Appt is scheduled for 7/5

## 2023-07-05 ENCOUNTER — OFFICE VISIT (OUTPATIENT)
Dept: UROLOGY | Facility: CLINIC | Age: 83
End: 2023-07-05
Payer: MEDICARE

## 2023-07-05 VITALS
BODY MASS INDEX: 25.76 KG/M2 | HEART RATE: 84 BPM | DIASTOLIC BLOOD PRESSURE: 87 MMHG | SYSTOLIC BLOOD PRESSURE: 148 MMHG | HEIGHT: 71 IN | WEIGHT: 184 LBS

## 2023-07-05 DIAGNOSIS — N28.1 RENAL CYST: ICD-10-CM

## 2023-07-05 PROCEDURE — 3077F SYST BP >= 140 MM HG: CPT | Mod: CPTII,S$GLB,, | Performed by: NURSE PRACTITIONER

## 2023-07-05 PROCEDURE — 1101F PR PT FALLS ASSESS DOC 0-1 FALLS W/OUT INJ PAST YR: ICD-10-PCS | Mod: CPTII,S$GLB,, | Performed by: NURSE PRACTITIONER

## 2023-07-05 PROCEDURE — 3288F FALL RISK ASSESSMENT DOCD: CPT | Mod: CPTII,S$GLB,, | Performed by: NURSE PRACTITIONER

## 2023-07-05 PROCEDURE — 99999 PR PBB SHADOW E&M-EST. PATIENT-LVL V: ICD-10-PCS | Mod: PBBFAC,,, | Performed by: NURSE PRACTITIONER

## 2023-07-05 PROCEDURE — 1125F AMNT PAIN NOTED PAIN PRSNT: CPT | Mod: CPTII,S$GLB,, | Performed by: NURSE PRACTITIONER

## 2023-07-05 PROCEDURE — 1101F PT FALLS ASSESS-DOCD LE1/YR: CPT | Mod: CPTII,S$GLB,, | Performed by: NURSE PRACTITIONER

## 2023-07-05 PROCEDURE — 1160F PR REVIEW ALL MEDS BY PRESCRIBER/CLIN PHARMACIST DOCUMENTED: ICD-10-PCS | Mod: CPTII,S$GLB,, | Performed by: NURSE PRACTITIONER

## 2023-07-05 PROCEDURE — 3288F PR FALLS RISK ASSESSMENT DOCUMENTED: ICD-10-PCS | Mod: CPTII,S$GLB,, | Performed by: NURSE PRACTITIONER

## 2023-07-05 PROCEDURE — 1125F PR PAIN SEVERITY QUANTIFIED, PAIN PRESENT: ICD-10-PCS | Mod: CPTII,S$GLB,, | Performed by: NURSE PRACTITIONER

## 2023-07-05 PROCEDURE — 1159F PR MEDICATION LIST DOCUMENTED IN MEDICAL RECORD: ICD-10-PCS | Mod: CPTII,S$GLB,, | Performed by: NURSE PRACTITIONER

## 2023-07-05 PROCEDURE — 3079F DIAST BP 80-89 MM HG: CPT | Mod: CPTII,S$GLB,, | Performed by: NURSE PRACTITIONER

## 2023-07-05 PROCEDURE — 3079F PR MOST RECENT DIASTOLIC BLOOD PRESSURE 80-89 MM HG: ICD-10-PCS | Mod: CPTII,S$GLB,, | Performed by: NURSE PRACTITIONER

## 2023-07-05 PROCEDURE — 1160F RVW MEDS BY RX/DR IN RCRD: CPT | Mod: CPTII,S$GLB,, | Performed by: NURSE PRACTITIONER

## 2023-07-05 PROCEDURE — 99203 PR OFFICE/OUTPT VISIT, NEW, LEVL III, 30-44 MIN: ICD-10-PCS | Mod: S$GLB,,, | Performed by: NURSE PRACTITIONER

## 2023-07-05 PROCEDURE — 1159F MED LIST DOCD IN RCRD: CPT | Mod: CPTII,S$GLB,, | Performed by: NURSE PRACTITIONER

## 2023-07-05 PROCEDURE — 99203 OFFICE O/P NEW LOW 30 MIN: CPT | Mod: S$GLB,,, | Performed by: NURSE PRACTITIONER

## 2023-07-05 PROCEDURE — 3077F PR MOST RECENT SYSTOLIC BLOOD PRESSURE >= 140 MM HG: ICD-10-PCS | Mod: CPTII,S$GLB,, | Performed by: NURSE PRACTITIONER

## 2023-07-05 PROCEDURE — 99999 PR PBB SHADOW E&M-EST. PATIENT-LVL V: CPT | Mod: PBBFAC,,, | Performed by: NURSE PRACTITIONER

## 2023-07-05 NOTE — PROGRESS NOTES
[facility] Note  Staff: RONI DurbinC    PCP: MD Sukh    Chief Complaint: Right Renal Cyst    Subjective:        HPI: Willie Liao is a 83 y.o. male NEW PT presents today for evaluation of recent finding of renal cyst on recent imaging.  He is accompanied by significant other during ov today.    Renal/Bladder US was performed on 6/22/23 due to c/o abdominal pain done by PCP office.  FINDINGS:  The right kidney measures 12.1 cm and the left 12.4 cm in longitudinal dimensions.  There is an 18 mm exophytic simple cyst emanating from the right lower kidney.  There is increased renal parenchymal echogenicity suggesting medical renal disease.  Resistive indices are elevated on the right and 0.75 and on the left within normal limits at 0.65.  There is no evidence of hydronephrosis.  The bladder is partially distended at the time of scanning and has an unremarkable appearance.     Impression:  Evidence of medical renal disease.  No evidence of hydronephrosis.  Acquired simple right renal cyst on the right      Family Hx of  Cancers?:  None    REVIEW OF SYSTEMS:  A comprehensive 10 system review was performed and is negative except as noted above in HPI    PMHx:  Past Medical History:   Diagnosis Date    Carotid stenosis     Coronary artery disease     Gastritis     GERD (gastroesophageal reflux disease)     H. pylori infection     Hyperlipidemia     Shingles      PSHx:  Past Surgical History:   Procedure Laterality Date    arthroscope Left     knee arthroscopy, torn meniscus    COLONOSCOPY      Adrien    CORONARY ANGIOGRAPHY N/A 2/7/2022    Procedure: ANGIOGRAM, CORONARY ARTERY;  Surgeon: John iLnn MD;  Location: [facility] CATH/EP LAB;  Service: Cardiology;  Laterality: N/A;    ECTROPION REPAIR Bilateral 3/13/2020    Procedure: REPAIR, ECTROPION, EYELID FLAPS - (Skin and/or Deep Tissue ;  Surgeon: Sarah Blackmon MD;  Location: [facility] OR [address] [place] [address];  Service: Ophthalmology;   Laterality: Bilateral;    LEFT HEART CATHETERIZATION Left 2/7/2022    Procedure: CATHETERIZATION, HEART, LEFT;  Surgeon: John Linn MD;  Location: [facility] CATH/EP LAB;  Service: Cardiology;  Laterality: Left;     Allergies:  No known drug allergies    Medications: reviewed   Objective:     Vitals:    07/05/23 1402   BP: (!) 148/87   Pulse: 84       General:WDWN in NAD  Eyes: PERRLA, normal conjunctiva  Respiratory: no increased work on breathing, clear to auscultation  Cardiovascular: regular rate and rhythm. No obvious extremity edema.  GI: palpation of masses. No tenderness. No hepatosplenomegaly to palpation.  Musculoskeletal: normal range of motion of bilateral upper extremities. Normal muscle strength and tone.  Skin: no obvious rashes or lesions. No tightening of skin noted.  Neurologic: CN grossly normal. Normal sensation.   Psychiatric: awake, alert and oriented x 3. Mood and affect normal. Cooperative.    Assessment:       1. Renal cyst          Plan:     RENAL CYSTS    Repeat Renal/Bladder US to be completed in 6 mos for recheck at that time in order to recheck if any changes noted to cysts but usually no further f/up is indicated and is a benign finding of a renal cyst.  But due to pt's current simple cyst and its size we will monitor closely at this time.    It was thoroughly explained to pt today that renal cysts are sacs of fluid that form in or on the kidneys.  Most of the time, they are simple (water-filled) cysts, or have thin to thick wall which can be more complex.  Most are fairly common in older patients and typically do not cause any symptoms or harm.    Pt and family verbalized understanding at this time.    Mary Katechsner: N/A    Coco Llamas, GALO-C

## 2023-10-09 RX ORDER — EZETIMIBE 10 MG/1
10 TABLET ORAL
Qty: 90 TABLET | Refills: 3 | Status: SHIPPED | OUTPATIENT
Start: 2023-10-09

## 2023-11-14 ENCOUNTER — OFFICE VISIT (OUTPATIENT)
Dept: FAMILY MEDICINE | Facility: CLINIC | Age: 83
End: 2023-11-14
Payer: MEDICARE

## 2023-11-14 VITALS
HEIGHT: 71 IN | BODY MASS INDEX: 25.65 KG/M2 | SYSTOLIC BLOOD PRESSURE: 144 MMHG | DIASTOLIC BLOOD PRESSURE: 86 MMHG | TEMPERATURE: 98 F | HEART RATE: 89 BPM | RESPIRATION RATE: 18 BRPM | WEIGHT: 183.19 LBS | OXYGEN SATURATION: 97 %

## 2023-11-14 DIAGNOSIS — E78.2 HYPERLIPIDEMIA, MIXED: Primary | ICD-10-CM

## 2023-11-14 DIAGNOSIS — R79.9 ABNORMAL FINDING OF BLOOD CHEMISTRY, UNSPECIFIED: ICD-10-CM

## 2023-11-14 PROBLEM — D69.2 OTHER NONTHROMBOCYTOPENIC PURPURA: Status: RESOLVED | Noted: 2021-11-12 | Resolved: 2023-11-14

## 2023-11-14 PROCEDURE — 3288F FALL RISK ASSESSMENT DOCD: CPT | Mod: HCNC,CPTII,S$GLB, | Performed by: FAMILY MEDICINE

## 2023-11-14 PROCEDURE — 3079F PR MOST RECENT DIASTOLIC BLOOD PRESSURE 80-89 MM HG: ICD-10-PCS | Mod: HCNC,CPTII,S$GLB, | Performed by: FAMILY MEDICINE

## 2023-11-14 PROCEDURE — 1101F PT FALLS ASSESS-DOCD LE1/YR: CPT | Mod: HCNC,CPTII,S$GLB, | Performed by: FAMILY MEDICINE

## 2023-11-14 PROCEDURE — 3288F PR FALLS RISK ASSESSMENT DOCUMENTED: ICD-10-PCS | Mod: HCNC,CPTII,S$GLB, | Performed by: FAMILY MEDICINE

## 2023-11-14 PROCEDURE — 99214 PR OFFICE/OUTPT VISIT, EST, LEVL IV, 30-39 MIN: ICD-10-PCS | Mod: HCNC,S$GLB,, | Performed by: FAMILY MEDICINE

## 2023-11-14 PROCEDURE — 99214 OFFICE O/P EST MOD 30 MIN: CPT | Mod: HCNC,S$GLB,, | Performed by: FAMILY MEDICINE

## 2023-11-14 PROCEDURE — 1159F MED LIST DOCD IN RCRD: CPT | Mod: HCNC,CPTII,S$GLB, | Performed by: FAMILY MEDICINE

## 2023-11-14 PROCEDURE — 3077F PR MOST RECENT SYSTOLIC BLOOD PRESSURE >= 140 MM HG: ICD-10-PCS | Mod: HCNC,CPTII,S$GLB, | Performed by: FAMILY MEDICINE

## 2023-11-14 PROCEDURE — 99999 PR PBB SHADOW E&M-EST. PATIENT-LVL IV: CPT | Mod: PBBFAC,HCNC,, | Performed by: FAMILY MEDICINE

## 2023-11-14 PROCEDURE — 3077F SYST BP >= 140 MM HG: CPT | Mod: HCNC,CPTII,S$GLB, | Performed by: FAMILY MEDICINE

## 2023-11-14 PROCEDURE — 1159F PR MEDICATION LIST DOCUMENTED IN MEDICAL RECORD: ICD-10-PCS | Mod: HCNC,CPTII,S$GLB, | Performed by: FAMILY MEDICINE

## 2023-11-14 PROCEDURE — 99999 PR PBB SHADOW E&M-EST. PATIENT-LVL IV: ICD-10-PCS | Mod: PBBFAC,HCNC,, | Performed by: FAMILY MEDICINE

## 2023-11-14 PROCEDURE — 1101F PR PT FALLS ASSESS DOC 0-1 FALLS W/OUT INJ PAST YR: ICD-10-PCS | Mod: HCNC,CPTII,S$GLB, | Performed by: FAMILY MEDICINE

## 2023-11-14 PROCEDURE — 1126F AMNT PAIN NOTED NONE PRSNT: CPT | Mod: HCNC,CPTII,S$GLB, | Performed by: FAMILY MEDICINE

## 2023-11-14 PROCEDURE — 1126F PR PAIN SEVERITY QUANTIFIED, NO PAIN PRESENT: ICD-10-PCS | Mod: HCNC,CPTII,S$GLB, | Performed by: FAMILY MEDICINE

## 2023-11-14 PROCEDURE — 3079F DIAST BP 80-89 MM HG: CPT | Mod: HCNC,CPTII,S$GLB, | Performed by: FAMILY MEDICINE

## 2023-11-14 NOTE — PROGRESS NOTES
Subjective:   Patient ID: Willie Liao is a 83 y.o. male     Chief Complaint:Annual Exam      Here for checkup      Review of Systems   Constitutional:  Negative for chills and fever.   HENT:  Negative for sore throat and trouble swallowing.    Respiratory:  Negative for cough and shortness of breath.    Cardiovascular:  Negative for chest pain and leg swelling.   Gastrointestinal:  Negative for abdominal distention and abdominal pain.   Genitourinary:  Negative for dysuria and flank pain.   Musculoskeletal:  Negative for arthralgias and back pain.   Skin:  Negative for color change and pallor.   Neurological:  Negative for weakness and headaches.   Psychiatric/Behavioral:  Negative for agitation and confusion.      Past Medical History:   Diagnosis Date    Carotid stenosis     Coronary artery disease     Gastritis     GERD (gastroesophageal reflux disease)     H. pylori infection     Hyperlipidemia     Shingles      Past Surgical History:   Procedure Laterality Date    arthroscope Left     knee arthroscopy, torn meniscus    COLONOSCOPY      Adrien    CORONARY ANGIOGRAPHY N/A 2/7/2022    Procedure: ANGIOGRAM, CORONARY ARTERY;  Surgeon: John Linn MD;  Location: [facility] CATH/EP LAB;  Service: Cardiology;  Laterality: N/A;    ECTROPION REPAIR Bilateral 3/13/2020    Procedure: REPAIR, ECTROPION, EYELID FLAPS - (Skin and/or Deep Tissue ;  Surgeon: Sarah Blackmon MD;  Location: [facility] OR [address] [place] [address];  Service: Ophthalmology;  Laterality: Bilateral;    LEFT HEART CATHETERIZATION Left 2/7/2022    Procedure: CATHETERIZATION, HEART, LEFT;  Surgeon: John Linn MD;  Location: [facility] CATH/EP LAB;  Service: Cardiology;  Laterality: Left;     Objective:     Vitals:    11/14/23 1401   BP: (!) 144/86   Pulse: 89   Resp: 18   Temp: 97.7 °F (36.5 °C)     Body mass index is 25.55 kg/m².  Physical Exam  Vitals and nursing note reviewed.   Constitutional:       Appearance: He is well-developed.   HENT:      Head:  Normocephalic and atraumatic.   Eyes:      General: No scleral icterus.     Conjunctiva/sclera: Conjunctivae normal.   Cardiovascular:      Heart sounds: No murmur heard.  Pulmonary:      Effort: Pulmonary effort is normal. No respiratory distress.   Musculoskeletal:         General: No deformity. Normal range of motion.      Cervical back: Normal range of motion and neck supple.   Skin:     Coloration: Skin is not pale.      Findings: No rash.   Neurological:      Mental Status: He is alert and oriented to person, place, and time.   Psychiatric:         Behavior: Behavior normal.         Thought Content: Thought content normal.         Judgment: Judgment normal.       Assessment:     1. Hyperlipidemia, mixed    2. Abnormal finding of blood chemistry, unspecified      Plan:   Hyperlipidemia, mixed  -     Comprehensive Metabolic Panel; Future; Expected date: 11/14/2023  -     Hemoglobin; Future; Expected date: 11/14/2023  -     Hemoglobin A1C; Future; Expected date: 11/14/2023  -     WBC; Future; Expected date: 11/14/2023  -     Platelet Count, Blue Top; Future; Expected date: 11/14/2023  -     Lipid Panel; Future; Expected date: 11/14/2023    Abnormal finding of blood chemistry, unspecified  -     Hemoglobin A1C; Future; Expected date: 11/14/2023            Total time spent of Greater than 30 minutes minutes on the day of the visit.This includes face to face time and preparing to see the patient, obtaining and reviewing separately obtained history, documenting clinical information in the electronic or other health record, independently interpreting results and communicating results to the patient/family/caregiver, or care coordinator.    Established patient with me has been instructed that must see me at least 1 time yearly (every 365 days) for refills of medications. Seeing other providers in this clinic is fine but expectation is to see me yearly.    Cooper Luz MD  11/14/2023    Portions of this note have  been dictated with NATHEN Garcia.

## 2023-11-15 ENCOUNTER — LAB VISIT (OUTPATIENT)
Dept: LAB | Facility: HOSPITAL | Age: 83
End: 2023-11-15
Attending: FAMILY MEDICINE
Payer: MEDICARE

## 2023-11-15 DIAGNOSIS — E78.2 HYPERLIPIDEMIA, MIXED: ICD-10-CM

## 2023-11-15 DIAGNOSIS — R79.9 ABNORMAL FINDING OF BLOOD CHEMISTRY, UNSPECIFIED: ICD-10-CM

## 2023-11-15 LAB
ALBUMIN SERPL BCP-MCNC: 3.9 G/DL (ref 3.5–5.2)
ALP SERPL-CCNC: 85 U/L (ref 55–135)
ALT SERPL W/O P-5'-P-CCNC: 25 U/L (ref 10–44)
ANION GAP SERPL CALC-SCNC: 8 MMOL/L (ref 8–16)
AST SERPL-CCNC: 22 U/L (ref 10–40)
BILIRUB SERPL-MCNC: 1.8 MG/DL (ref 0.1–1)
BUN SERPL-MCNC: 15 MG/DL (ref 8–23)
CALCIUM SERPL-MCNC: 8.9 MG/DL (ref 8.7–10.5)
CHLORIDE SERPL-SCNC: 108 MMOL/L (ref 95–110)
CHOLEST SERPL-MCNC: 136 MG/DL (ref 120–199)
CHOLEST/HDLC SERPL: 3.9 {RATIO} (ref 2–5)
CO2 SERPL-SCNC: 27 MMOL/L (ref 23–29)
CREAT SERPL-MCNC: 0.9 MG/DL (ref 0.5–1.4)
EST. GFR  (NO RACE VARIABLE): >60 ML/MIN/1.73 M^2
ESTIMATED AVG GLUCOSE: 131 MG/DL (ref 68–131)
GLUCOSE SERPL-MCNC: 125 MG/DL (ref 70–110)
HBA1C MFR BLD: 6.2 % (ref 4–5.6)
HDLC SERPL-MCNC: 35 MG/DL (ref 40–75)
HDLC SERPL: 25.7 % (ref 20–50)
HGB BLD-MCNC: 15.6 G/DL (ref 14–18)
LDLC SERPL CALC-MCNC: 79.2 MG/DL (ref 63–159)
MPV, BLUE TOP: 11.1 FL (ref 9.2–12.9)
NONHDLC SERPL-MCNC: 101 MG/DL
PLATELET, BLUE TOP: 205 K/UL (ref 150–450)
POTASSIUM SERPL-SCNC: 4.3 MMOL/L (ref 3.5–5.1)
PROT SERPL-MCNC: 6.9 G/DL (ref 6–8.4)
SODIUM SERPL-SCNC: 143 MMOL/L (ref 136–145)
TRIGL SERPL-MCNC: 109 MG/DL (ref 30–150)
WBC # BLD AUTO: 8 K/UL (ref 3.9–12.7)

## 2023-11-15 PROCEDURE — 80061 LIPID PANEL: CPT | Mod: HCNC | Performed by: FAMILY MEDICINE

## 2023-11-15 PROCEDURE — 85048 AUTOMATED LEUKOCYTE COUNT: CPT | Mod: HCNC | Performed by: FAMILY MEDICINE

## 2023-11-15 PROCEDURE — 83036 HEMOGLOBIN GLYCOSYLATED A1C: CPT | Mod: HCNC | Performed by: FAMILY MEDICINE

## 2023-11-15 PROCEDURE — 80053 COMPREHEN METABOLIC PANEL: CPT | Mod: HCNC | Performed by: FAMILY MEDICINE

## 2023-11-15 PROCEDURE — 85049 AUTOMATED PLATELET COUNT: CPT | Mod: HCNC | Performed by: FAMILY MEDICINE

## 2023-11-15 PROCEDURE — 36415 COLL VENOUS BLD VENIPUNCTURE: CPT | Mod: HCNC,PO | Performed by: FAMILY MEDICINE

## 2023-11-15 PROCEDURE — 85018 HEMOGLOBIN: CPT | Mod: HCNC | Performed by: FAMILY MEDICINE

## 2023-12-11 ENCOUNTER — OFFICE VISIT (OUTPATIENT)
Dept: CARDIOLOGY | Facility: CLINIC | Age: 83
End: 2023-12-11
Payer: MEDICARE

## 2023-12-11 VITALS
OXYGEN SATURATION: 96 % | WEIGHT: 181 LBS | HEART RATE: 82 BPM | SYSTOLIC BLOOD PRESSURE: 132 MMHG | BODY MASS INDEX: 25.34 KG/M2 | DIASTOLIC BLOOD PRESSURE: 62 MMHG | HEIGHT: 71 IN

## 2023-12-11 DIAGNOSIS — E78.2 HYPERLIPIDEMIA, MIXED: ICD-10-CM

## 2023-12-11 DIAGNOSIS — I10 HYPERTENSION, UNSPECIFIED TYPE: ICD-10-CM

## 2023-12-11 DIAGNOSIS — E78.00 HYPERCHOLESTEREMIA: Primary | ICD-10-CM

## 2023-12-11 DIAGNOSIS — I25.10 ASCVD (ARTERIOSCLEROTIC CARDIOVASCULAR DISEASE): ICD-10-CM

## 2023-12-11 DIAGNOSIS — I65.22 STENOSIS OF LEFT CAROTID ARTERY: ICD-10-CM

## 2023-12-11 PROCEDURE — 1101F PR PT FALLS ASSESS DOC 0-1 FALLS W/OUT INJ PAST YR: ICD-10-PCS | Mod: HCNC,CPTII,S$GLB, | Performed by: INTERNAL MEDICINE

## 2023-12-11 PROCEDURE — 3078F DIAST BP <80 MM HG: CPT | Mod: HCNC,CPTII,S$GLB, | Performed by: INTERNAL MEDICINE

## 2023-12-11 PROCEDURE — 3288F PR FALLS RISK ASSESSMENT DOCUMENTED: ICD-10-PCS | Mod: HCNC,CPTII,S$GLB, | Performed by: INTERNAL MEDICINE

## 2023-12-11 PROCEDURE — 99999 PR PBB SHADOW E&M-EST. PATIENT-LVL IV: ICD-10-PCS | Mod: PBBFAC,HCNC,, | Performed by: INTERNAL MEDICINE

## 2023-12-11 PROCEDURE — 1159F PR MEDICATION LIST DOCUMENTED IN MEDICAL RECORD: ICD-10-PCS | Mod: HCNC,CPTII,S$GLB, | Performed by: INTERNAL MEDICINE

## 2023-12-11 PROCEDURE — 3288F FALL RISK ASSESSMENT DOCD: CPT | Mod: HCNC,CPTII,S$GLB, | Performed by: INTERNAL MEDICINE

## 2023-12-11 PROCEDURE — 1160F PR REVIEW ALL MEDS BY PRESCRIBER/CLIN PHARMACIST DOCUMENTED: ICD-10-PCS | Mod: HCNC,CPTII,S$GLB, | Performed by: INTERNAL MEDICINE

## 2023-12-11 PROCEDURE — 1160F RVW MEDS BY RX/DR IN RCRD: CPT | Mod: HCNC,CPTII,S$GLB, | Performed by: INTERNAL MEDICINE

## 2023-12-11 PROCEDURE — 1126F AMNT PAIN NOTED NONE PRSNT: CPT | Mod: HCNC,CPTII,S$GLB, | Performed by: INTERNAL MEDICINE

## 2023-12-11 PROCEDURE — 1126F PR PAIN SEVERITY QUANTIFIED, NO PAIN PRESENT: ICD-10-PCS | Mod: HCNC,CPTII,S$GLB, | Performed by: INTERNAL MEDICINE

## 2023-12-11 PROCEDURE — 3075F SYST BP GE 130 - 139MM HG: CPT | Mod: HCNC,CPTII,S$GLB, | Performed by: INTERNAL MEDICINE

## 2023-12-11 PROCEDURE — 3075F PR MOST RECENT SYSTOLIC BLOOD PRESS GE 130-139MM HG: ICD-10-PCS | Mod: HCNC,CPTII,S$GLB, | Performed by: INTERNAL MEDICINE

## 2023-12-11 PROCEDURE — 99999 PR PBB SHADOW E&M-EST. PATIENT-LVL IV: CPT | Mod: PBBFAC,HCNC,, | Performed by: INTERNAL MEDICINE

## 2023-12-11 PROCEDURE — 1101F PT FALLS ASSESS-DOCD LE1/YR: CPT | Mod: HCNC,CPTII,S$GLB, | Performed by: INTERNAL MEDICINE

## 2023-12-11 PROCEDURE — 1159F MED LIST DOCD IN RCRD: CPT | Mod: HCNC,CPTII,S$GLB, | Performed by: INTERNAL MEDICINE

## 2023-12-11 PROCEDURE — 99214 PR OFFICE/OUTPT VISIT, EST, LEVL IV, 30-39 MIN: ICD-10-PCS | Mod: HCNC,S$GLB,, | Performed by: INTERNAL MEDICINE

## 2023-12-11 PROCEDURE — 99214 OFFICE O/P EST MOD 30 MIN: CPT | Mod: HCNC,S$GLB,, | Performed by: INTERNAL MEDICINE

## 2023-12-11 PROCEDURE — 3078F PR MOST RECENT DIASTOLIC BLOOD PRESSURE < 80 MM HG: ICD-10-PCS | Mod: HCNC,CPTII,S$GLB, | Performed by: INTERNAL MEDICINE

## 2023-12-11 RX ORDER — METOPROLOL SUCCINATE 25 MG/1
25 TABLET, EXTENDED RELEASE ORAL DAILY
Qty: 90 TABLET | Refills: 11 | Status: SHIPPED | OUTPATIENT
Start: 2023-12-11

## 2023-12-11 NOTE — PROGRESS NOTES
Patient ID:  Willie Liao is a 83 y.o. male who presents for follow-up of Hyperlipidemia, Coronary Artery Disease, and Hypertension      In general he has been doing well his blood pressure runs anywhere between 102,267 in the mornings.  He denies any chest pains any shortness of breath.  Blood pressure taking by me 134/62 by his machine 133/78 his pulse is 77.         Past Medical History:   Diagnosis Date    Carotid stenosis     Coronary artery disease     Gastritis     GERD (gastroesophageal reflux disease)     H. pylori infection     Hyperlipidemia     Shingles         Past Surgical History:   Procedure Laterality Date    arthroscope Left     knee arthroscopy, torn meniscus    COLONOSCOPY      Adrien    CORONARY ANGIOGRAPHY N/A 2/7/2022    Procedure: ANGIOGRAM, CORONARY ARTERY;  Surgeon: John Linn MD;  Location: [facility] CATH/EP LAB;  Service: Cardiology;  Laterality: N/A;    ECTROPION REPAIR Bilateral 3/13/2020    Procedure: REPAIR, ECTROPION, EYELID FLAPS - (Skin and/or Deep Tissue ;  Surgeon: Sarah Blackmon MD;  Location: [facility] OR [address] [place] [address];  Service: Ophthalmology;  Laterality: Bilateral;    LEFT HEART CATHETERIZATION Left 2/7/2022    Procedure: CATHETERIZATION, HEART, LEFT;  Surgeon: John Linn MD;  Location: [facility] CATH/EP LAB;  Service: Cardiology;  Laterality: Left;          Current Outpatient Medications   Medication Instructions    amLODIPine (NORVASC) 2.5 MG tablet TAKE 1 TABLET(2.5 MG) BY MOUTH EVERY DAY    aspirin (ECOTRIN) 81 mg, Oral, Every other day    atorvastatin (LIPITOR) 20 MG tablet TAKE 1 TABLET(20 MG) BY MOUTH EVERY DAY    CALCIUM ORAL 1 tablet, Oral, Daily    CYANOCOBALAMIN, VITAMIN B-12, (VITAMIN B-12 ORAL) 1 tablet, Oral, Daily    ezetimibe (ZETIA) 10 mg, Oral    glucosam/chond-msm1/C/angely/bor (GLUCOSAMINE-CHOND-MSM COMPLEX ORAL) 1 tablet, Oral, Daily    metoprolol succinate (TOPROL-XL) 25 mg, Oral, Daily    MULTIVITAMIN WITH MINERALS/LUT (MULTIVITAMIN 50 PLUS  "ORAL) 1 tablet, Oral, Daily    nitroGLYCERIN (NITROSTAT) 0.4 mg, Sublingual, Every 5 min PRN    omega-3 fatty acids/fish oil (FISH OIL-OMEGA-3 FATTY ACIDS) 300-1,000 mg capsule Oral, Daily    pantoprazole (PROTONIX) 40 mg, Oral, Daily    vitamin A 10,000 Units, Oral, Daily    VITAMIN C 500 MG tablet No dose, route, or frequency recorded.    zinc gluconate 50 mg, Oral, Daily        Review of patient's allergies indicates:   Allergen Reactions    No known drug allergies         Review of Systems   Cardiovascular:  Negative for chest pain, dyspnea on exertion, leg swelling and palpitations.   Respiratory:  Negative for cough and shortness of breath.         Objective:     Vitals:    12/11/23 0936   BP: 132/62   BP Location: Left arm   Patient Position: Sitting   BP Method: Medium (Manual)   Pulse: 82   SpO2: 96%   Weight: 82.1 kg (181 lb)   Height: 5' 11" (1.803 m)       Physical Exam  Vitals and nursing note reviewed.   Constitutional:       Appearance: He is well-developed.   HENT:      Head: Normocephalic and atraumatic.   Eyes:      Conjunctiva/sclera: Conjunctivae normal.   Cardiovascular:      Rate and Rhythm: Normal rate and regular rhythm.      Heart sounds: Normal heart sounds.   Pulmonary:      Effort: Pulmonary effort is normal.      Breath sounds: Normal breath sounds.   Abdominal:      General: Bowel sounds are normal.      Palpations: Abdomen is soft.   Musculoskeletal:         General: Normal range of motion.   Skin:     General: Skin is warm and dry.   Neurological:      Mental Status: He is alert and oriented to person, place, and time.   Psychiatric:         Behavior: Behavior normal.         Thought Content: Thought content normal.         Judgment: Judgment normal.       CMP  Sodium   Date Value Ref Range Status   11/15/2023 143 136 - 145 mmol/L Final     Potassium   Date Value Ref Range Status   11/15/2023 4.3 3.5 - 5.1 mmol/L Final     Chloride   Date Value Ref Range Status   11/15/2023 108 95 - " 110 mmol/L Final     CO2   Date Value Ref Range Status   11/15/2023 27 23 - 29 mmol/L Final     Glucose   Date Value Ref Range Status   11/15/2023 125 (H) 70 - 110 mg/dL Final     BUN   Date Value Ref Range Status   11/15/2023 15 8 - 23 mg/dL Final     Creatinine   Date Value Ref Range Status   11/15/2023 0.9 0.5 - 1.4 mg/dL Final     Calcium   Date Value Ref Range Status   11/15/2023 8.9 8.7 - 10.5 mg/dL Final     Total Protein   Date Value Ref Range Status   11/15/2023 6.9 6.0 - 8.4 g/dL Final     Albumin   Date Value Ref Range Status   11/15/2023 3.9 3.5 - 5.2 g/dL Final     Total Bilirubin   Date Value Ref Range Status   11/15/2023 1.8 (H) 0.1 - 1.0 mg/dL Final     Comment:     For infants and newborns, interpretation of results should be based  on gestational age, weight and in agreement with clinical  observations.    Premature Infant recommended reference ranges:  Up to 24 hours.............<8.0 mg/dL  Up to 48 hours............<12.0 mg/dL  3-5 days..................<15.0 mg/dL  6-29 days.................<15.0 mg/dL       Alkaline Phosphatase   Date Value Ref Range Status   11/15/2023 85 55 - 135 U/L Final     AST   Date Value Ref Range Status   11/15/2023 22 10 - 40 U/L Final     ALT   Date Value Ref Range Status   11/15/2023 25 10 - 44 U/L Final     Anion Gap   Date Value Ref Range Status   11/15/2023 8 8 - 16 mmol/L Final     eGFR if    Date Value Ref Range Status   02/04/2022 >60.0 >60 mL/min/1.73 m^2 Final     eGFR if non    Date Value Ref Range Status   02/04/2022 >60.0 >60 mL/min/1.73 m^2 Final     Comment:     Calculation used to obtain the estimated glomerular filtration  rate (eGFR) is the CKD-EPI equation.         BMP  Lab Results   Component Value Date     11/15/2023    K 4.3 11/15/2023     11/15/2023    CO2 27 11/15/2023    BUN 15 11/15/2023    CREATININE 0.9 11/15/2023    CALCIUM 8.9 11/15/2023    ANIONGAP 8 11/15/2023    ESTGFRAFRICA >60.0 02/04/2022     EGFRNONAA >60.0 02/04/2022      BNP  @LABRCNTIP(BNP,BNPTRIAGEBLO)@   Lab Results   Component Value Date    CHOL 136 11/15/2023    CHOL 118 (L) 10/18/2022    CHOL 157 11/15/2021     Lab Results   Component Value Date    HDL 35 (L) 11/15/2023    HDL 37 (L) 10/18/2022    HDL 40 11/15/2021     Lab Results   Component Value Date    LDLCALC 79.2 11/15/2023    LDLCALC 61.0 (L) 10/18/2022    LDLCALC 90.6 11/15/2021     Lab Results   Component Value Date    TRIG 109 11/15/2023    TRIG 100 10/18/2022    TRIG 132 11/15/2021     Lab Results   Component Value Date    CHOLHDL 25.7 11/15/2023    CHOLHDL 31.4 10/18/2022    CHOLHDL 25.5 11/15/2021      Lab Results   Component Value Date    TSH 2.229 03/01/2018     Lab Results   Component Value Date    HGBA1C 6.2 (H) 11/15/2023     Lab Results   Component Value Date    WBC 8.00 11/15/2023    HGB 15.6 11/15/2023    HCT 44.8 06/21/2023    MCV 93 06/21/2023     06/21/2023         No results found for this or any previous visit.     Results for orders placed during the hospital encounter of 02/07/22    Cardiac catheterization    Conclusion  · The pre-procedure left ventricular end diastolic pressure was 9.  · The post-procedure left ventricular end diastolic pressure was 9.  · The estimated blood loss was none.  · There was non-obstructive coronary artery disease..    The procedure log was documented by Documenter: Jersey Neely and verified by John Linn MD.    Date: 2/9/2022  Time: 7:21 PM         Assessment:       ASCVD (arteriosclerotic cardiovascular disease)  No symptoms of angina    Hyperlipidemia, mixed  On 20 mg of atorvastatin 10 mg of ezetimibe    Carotid arterial disease  Aware will follow       Plan:       Start therapy with Toprol XL 25 mg daily.  He was encouraged to have a pill box so he can keep track of the medicines he is taking.  Return to the office in 6 months

## 2023-12-28 ENCOUNTER — HOSPITAL ENCOUNTER (OUTPATIENT)
Dept: CARDIOLOGY | Facility: HOSPITAL | Age: 83
Discharge: HOME | End: 2023-12-28
Attending: INTERNAL MEDICINE
Payer: MEDICARE

## 2023-12-28 VITALS — WEIGHT: 181 LBS | HEIGHT: 71 IN | BODY MASS INDEX: 25.34 KG/M2

## 2023-12-28 DIAGNOSIS — I10 HYPERTENSION, UNSPECIFIED TYPE: ICD-10-CM

## 2023-12-28 DIAGNOSIS — I65.22 STENOSIS OF LEFT CAROTID ARTERY: ICD-10-CM

## 2023-12-28 DIAGNOSIS — E78.00 HYPERCHOLESTEREMIA: ICD-10-CM

## 2023-12-28 PROCEDURE — 93306 TTE W/DOPPLER COMPLETE: CPT

## 2023-12-28 PROCEDURE — 93306 ECHO (CUPID ONLY): ICD-10-PCS | Mod: 26,,, | Performed by: INTERNAL MEDICINE

## 2023-12-28 PROCEDURE — 93306 TTE W/DOPPLER COMPLETE: CPT | Mod: 26,,, | Performed by: INTERNAL MEDICINE

## 2024-01-02 LAB
AORTIC ROOT ANNULUS: 3.5 CM
AORTIC VALVE CUSP SEPERATION: 1.6 CM
AV INDEX (PROSTH): 1.06
AV MEAN GRADIENT: 2 MMHG
AV PEAK GRADIENT: 3 MMHG
AV VALVE AREA BY VELOCITY RATIO: 3.07 CM²
AV VALVE AREA: 3.32 CM²
AV VELOCITY RATIO: 0.98
BSA FOR ECHO PROCEDURE: 2.03 M2
CV ECHO LV RWT: 0.54 CM
DOP CALC AO PEAK VEL: 0.93 M/S
DOP CALC AO VTI: 19.3 CM
DOP CALC LVOT AREA: 3.1 CM2
DOP CALC LVOT DIAMETER: 2 CM
DOP CALC LVOT PEAK VEL: 0.91 M/S
DOP CALC LVOT STROKE VOLUME: 64.06 CM3
DOP CALC MV VTI: 20.8 CM
DOP CALCLVOT PEAK VEL VTI: 20.4 CM
E WAVE DECELERATION TIME: 257 MSEC
E/A RATIO: 0.51
E/E' RATIO: 6.71 M/S
ECHO LV POSTERIOR WALL: 1.08 CM (ref 0.6–1.1)
EJECTION FRACTION: 60 %
FRACTIONAL SHORTENING: 30 % (ref 28–44)
INTERVENTRICULAR SEPTUM: 1.07 CM (ref 0.6–1.1)
IVC DIAMETER: 1.23 CM
IVRT: 85 MSEC
LEFT ATRIUM SIZE: 3.4 CM
LEFT ATRIUM VOLUME INDEX MOD: 17.6 ML/M2
LEFT ATRIUM VOLUME MOD: 35.6 CM3
LEFT INTERNAL DIMENSION IN SYSTOLE: 2.84 CM (ref 2.1–4)
LEFT VENTRICLE DIASTOLIC VOLUME INDEX: 35.3 ML/M2
LEFT VENTRICLE DIASTOLIC VOLUME: 71.3 ML
LEFT VENTRICLE MASS INDEX: 71 G/M2
LEFT VENTRICLE SYSTOLIC VOLUME INDEX: 15.1 ML/M2
LEFT VENTRICLE SYSTOLIC VOLUME: 30.6 ML
LEFT VENTRICULAR INTERNAL DIMENSION IN DIASTOLE: 4.03 CM (ref 3.5–6)
LEFT VENTRICULAR MASS: 142.52 G
LV LATERAL E/E' RATIO: 5.88 M/S
LV SEPTAL E/E' RATIO: 7.83 M/S
LVOT MG: 2 MMHG
LVOT MV: 0.58 CM/S
MV MEAN GRADIENT: 1 MMHG
MV PEAK A VEL: 0.93 M/S
MV PEAK E VEL: 0.47 M/S
MV PEAK GRADIENT: 4 MMHG
MV VALVE AREA BY CONTINUITY EQUATION: 3.08 CM2
PISA TR MAX VEL: 2.2 M/S
PV MV: 0.52 M/S
PV PEAK GRADIENT: 2 MMHG
PV PEAK VELOCITY: 0.73 M/S
RA PRESSURE ESTIMATED: 3 MMHG
RIGHT VENTRICULAR END-DIASTOLIC DIMENSION: 2.1 CM
RV TB RVSP: 5 MMHG
RV TISSUE DOPPLER FREE WALL SYSTOLIC VELOCITY 1 (APICAL 4 CHAMBER VIEW): 9.79 CM/S
TDI LATERAL: 0.08 M/S
TDI SEPTAL: 0.06 M/S
TDI: 0.07 M/S
TR MAX PG: 19 MMHG
TRICUSPID ANNULAR PLANE SYSTOLIC EXCURSION: 1.84 CM
TV REST PULMONARY ARTERY PRESSURE: 22 MMHG
Z-SCORE OF LEFT VENTRICULAR DIMENSION IN END DIASTOLE: -3.9
Z-SCORE OF LEFT VENTRICULAR DIMENSION IN END SYSTOLE: -1.98

## 2024-01-03 ENCOUNTER — HOSPITAL ENCOUNTER (OUTPATIENT)
Dept: RADIOLOGY | Facility: HOSPITAL | Age: 84
Discharge: HOME | End: 2024-01-03
Attending: NURSE PRACTITIONER
Payer: MEDICARE

## 2024-01-03 DIAGNOSIS — N28.1 RENAL CYST: ICD-10-CM

## 2024-01-03 PROCEDURE — 76770 US EXAM ABDO BACK WALL COMP: CPT | Mod: TC

## 2024-01-03 PROCEDURE — 76770 US EXAM ABDO BACK WALL COMP: CPT | Mod: 26,,, | Performed by: RADIOLOGY

## 2024-01-12 ENCOUNTER — HOSPITAL ENCOUNTER (OUTPATIENT)
Dept: RADIOLOGY | Facility: CLINIC | Age: 84
Discharge: HOME | End: 2024-01-12
Attending: NURSE PRACTITIONER
Payer: MEDICARE

## 2024-01-12 ENCOUNTER — OFFICE VISIT (OUTPATIENT)
Dept: FAMILY MEDICINE | Facility: CLINIC | Age: 84
End: 2024-01-12
Payer: MEDICARE

## 2024-01-12 VITALS
HEART RATE: 67 BPM | HEIGHT: 71 IN | WEIGHT: 183.88 LBS | OXYGEN SATURATION: 96 % | BODY MASS INDEX: 25.74 KG/M2 | DIASTOLIC BLOOD PRESSURE: 70 MMHG | TEMPERATURE: 99 F | SYSTOLIC BLOOD PRESSURE: 132 MMHG

## 2024-01-12 DIAGNOSIS — R07.81 RIB PAIN ON RIGHT SIDE: ICD-10-CM

## 2024-01-12 DIAGNOSIS — G89.29 CHRONIC RIGHT-SIDED LOW BACK PAIN WITHOUT SCIATICA: ICD-10-CM

## 2024-01-12 DIAGNOSIS — G89.29 CHRONIC RIGHT-SIDED LOW BACK PAIN WITHOUT SCIATICA: Primary | ICD-10-CM

## 2024-01-12 DIAGNOSIS — M54.50 CHRONIC RIGHT-SIDED LOW BACK PAIN WITHOUT SCIATICA: Primary | ICD-10-CM

## 2024-01-12 DIAGNOSIS — M54.50 CHRONIC RIGHT-SIDED LOW BACK PAIN WITHOUT SCIATICA: ICD-10-CM

## 2024-01-12 PROCEDURE — 3075F SYST BP GE 130 - 139MM HG: CPT | Mod: HCNC,CPTII,S$GLB, | Performed by: NURSE PRACTITIONER

## 2024-01-12 PROCEDURE — 1101F PT FALLS ASSESS-DOCD LE1/YR: CPT | Mod: HCNC,CPTII,S$GLB, | Performed by: NURSE PRACTITIONER

## 2024-01-12 PROCEDURE — 1159F MED LIST DOCD IN RCRD: CPT | Mod: HCNC,CPTII,S$GLB, | Performed by: NURSE PRACTITIONER

## 2024-01-12 PROCEDURE — 1125F AMNT PAIN NOTED PAIN PRSNT: CPT | Mod: HCNC,CPTII,S$GLB, | Performed by: NURSE PRACTITIONER

## 2024-01-12 PROCEDURE — 72100 X-RAY EXAM L-S SPINE 2/3 VWS: CPT | Mod: TC,HCNC,FY,PO

## 2024-01-12 PROCEDURE — 99213 OFFICE O/P EST LOW 20 MIN: CPT | Mod: HCNC,S$GLB,, | Performed by: NURSE PRACTITIONER

## 2024-01-12 PROCEDURE — 3078F DIAST BP <80 MM HG: CPT | Mod: HCNC,CPTII,S$GLB, | Performed by: NURSE PRACTITIONER

## 2024-01-12 PROCEDURE — 99999 PR PBB SHADOW E&M-EST. PATIENT-LVL V: CPT | Mod: PBBFAC,HCNC,, | Performed by: NURSE PRACTITIONER

## 2024-01-12 PROCEDURE — 71100 X-RAY EXAM RIBS UNI 2 VIEWS: CPT | Mod: 26,HCNC,RT,S$GLB | Performed by: RADIOLOGY

## 2024-01-12 PROCEDURE — 1160F RVW MEDS BY RX/DR IN RCRD: CPT | Mod: HCNC,CPTII,S$GLB, | Performed by: NURSE PRACTITIONER

## 2024-01-12 PROCEDURE — 71100 X-RAY EXAM RIBS UNI 2 VIEWS: CPT | Mod: TC,HCNC,FY,PO,RT

## 2024-01-12 PROCEDURE — 3288F FALL RISK ASSESSMENT DOCD: CPT | Mod: HCNC,CPTII,S$GLB, | Performed by: NURSE PRACTITIONER

## 2024-01-12 PROCEDURE — 72100 X-RAY EXAM L-S SPINE 2/3 VWS: CPT | Mod: 26,HCNC,, | Performed by: RADIOLOGY

## 2024-01-12 RX ORDER — TIZANIDINE 4 MG/1
4 TABLET ORAL 3 TIMES DAILY PRN
Qty: 30 TABLET | Refills: 0 | Status: CANCELLED | OUTPATIENT
Start: 2024-01-12 | End: 2024-01-22

## 2024-01-12 RX ORDER — MELOXICAM 7.5 MG/1
7.5 TABLET ORAL DAILY
Qty: 30 TABLET | Refills: 1 | Status: CANCELLED | OUTPATIENT
Start: 2024-01-12

## 2024-01-12 NOTE — PROGRESS NOTES
"Subjective:       Patient ID: Willie Liao is a 83 y.o. male.    Chief Complaint: lower back pain     HPI   84 y/o male patient with medical problems listed below presents for right ribs pain and lower back pain for one year. Denies recent trauma to the affected area. Patient is followed by Urology NP YADI'Emilia for right renal cyst and states had ultrasound done lately which was "normal." Denies worsening pain. Describes pain as aching, intermittent, aggravated by movement, non radiating, not associate with coughing, chest pain, sob, nausea, vomiting, abdominal pain, urinary or bowel symptoms, fever, chills, skin rash, generalized body ache or weakness. He states no need to take medications for pain.     US Retroperitoneal 1/3/2024  FINDINGS:  The right kidney measures 11.1 cm in length and the left 11.7 cm.  There is mild elevation of the right renal resistive index at 0.82, nonspecific.  That on the left is within normal limits.  The previously described cyst of the lower pole of the right kidney is not identified on today's exam.  There is no hydronephrosis.  The urinary bladder is unremarkable.     Impression:   Nonvisualization of the previously present cyst of the lower pole of the right kidney.    Labs reviewed from 11/2023    Patient Active Problem List   Diagnosis    Carotid arterial disease    Gilbert's syndrome    Gastroesophageal reflux disease    Hyperlipidemia, mixed    Subclavian artery stenosis, left    Hyperglycemia    Postherpetic neuralgia    Ectropion of both lower eyelids    ASCVD (arteriosclerotic cardiovascular disease)      Review of patient's allergies indicates:   Allergen Reactions    No known drug allergies      Past Surgical History:   Procedure Laterality Date    arthroscope Left     knee arthroscopy, torn meniscus    COLONOSCOPY      Adrien    CORONARY ANGIOGRAPHY N/A 2/7/2022    Procedure: ANGIOGRAM, CORONARY ARTERY;  Surgeon: John Linn MD;  Location: [facility] CATH/EP LAB;  " Service: Cardiology;  Laterality: N/A;    ECTROPION REPAIR Bilateral 3/13/2020    Procedure: REPAIR, ECTROPION, EYELID FLAPS - (Skin and/or Deep Tissue ;  Surgeon: Sarah Blackmon MD;  Location: [facility] OR [address] [place] [address];  Service: Ophthalmology;  Laterality: Bilateral;    LEFT HEART CATHETERIZATION Left 2/7/2022    Procedure: CATHETERIZATION, HEART, LEFT;  Surgeon: John Linn MD;  Location: [facility] CATH/EP LAB;  Service: Cardiology;  Laterality: Left;        Current Outpatient Medications:     amLODIPine (NORVASC) 2.5 MG tablet, TAKE 1 TABLET(2.5 MG) BY MOUTH EVERY DAY, Disp: 30 tablet, Rfl: 11    aspirin (ECOTRIN) 81 MG EC tablet, Take 81 mg by mouth every other day., Disp: , Rfl:     atorvastatin (LIPITOR) 20 MG tablet, TAKE 1 TABLET(20 MG) BY MOUTH EVERY DAY, Disp: 90 tablet, Rfl: 3    CALCIUM ORAL, Take 1 tablet by mouth once daily., Disp: , Rfl:     CYANOCOBALAMIN, VITAMIN B-12, (VITAMIN B-12 ORAL), Take 1 tablet by mouth once daily., Disp: , Rfl:     ezetimibe (ZETIA) 10 mg tablet, TAKE 1 TABLET(10 MG) BY MOUTH EVERY DAY, Disp: 90 tablet, Rfl: 3    glucosam/chond-msm1/C/angely/bor (GLUCOSAMINE-CHOND-MSM COMPLEX ORAL), Take 1 tablet by mouth once daily., Disp: , Rfl:     metoprolol succinate (TOPROL-XL) 25 MG 24 hr tablet, Take 1 tablet (25 mg total) by mouth once daily., Disp: 90 tablet, Rfl: 11    MULTIVITAMIN WITH MINERALS/LUT (MULTIVITAMIN 50 PLUS ORAL), Take 1 tablet by mouth once daily. , Disp: , Rfl:     omega-3 fatty acids/fish oil (FISH OIL-OMEGA-3 FATTY ACIDS) 300-1,000 mg capsule, Take by mouth once daily., Disp: , Rfl:     pantoprazole (PROTONIX) 40 MG tablet, Take 40 mg by mouth once daily., Disp: , Rfl:     vitamin A 23300 UNIT capsule, Take 10,000 Units by mouth once daily., Disp: , Rfl:     VITAMIN C 500 MG tablet, , Disp: , Rfl:     zinc gluconate 50 mg tablet, Take 50 mg by mouth once daily., Disp: , Rfl:     nitroGLYCERIN (NITROSTAT) 0.4 MG SL tablet, Place 1 tablet (0.4 mg total) under the tongue  "every 5 (five) minutes as needed for Chest pain., Disp: 25 tablet, Rfl: 6  No current facility-administered medications for this visit.    Facility-Administered Medications Ordered in Other Visits:     tetracaine HCL 0.5% ophthalmic solution 1 drop, 1 drop, Both Eyes, Once, Peri Galeana MD    Review of Systems   Constitutional:  Negative for chills and fever.   Respiratory:  Negative for cough, chest tightness and shortness of breath.    Cardiovascular:  Negative for chest pain and palpitations.   Gastrointestinal:  Negative for abdominal pain, nausea and vomiting.   Genitourinary:  Negative for dysuria.   Musculoskeletal:  Positive for back pain. Negative for gait problem.   Neurological:  Negative for dizziness and headaches.       Objective:   /70 (BP Location: Left arm, Patient Position: Sitting, BP Method: Medium (Manual))   Pulse 67   Temp 98.5 °F (36.9 °C) (Oral)   Ht 5' 11" (1.803 m)   Wt 83.4 kg (183 lb 13.8 oz)   SpO2 96%   BMI 25.64 kg/m²         Physical Exam  Vitals reviewed.   Constitutional:       General: He is not in acute distress.     Appearance: Normal appearance.   Cardiovascular:      Rate and Rhythm: Normal rate and regular rhythm.      Pulses: Normal pulses.      Heart sounds: Normal heart sounds.   Pulmonary:      Effort: Pulmonary effort is normal.      Breath sounds: Normal breath sounds.   Chest:      Chest wall: No deformity, swelling or edema.   Abdominal:      General: Abdomen is flat. Bowel sounds are normal.      Palpations: Abdomen is soft.      Tenderness: There is no abdominal tenderness.   Musculoskeletal:      Cervical back: Normal range of motion.      Comments: +mild tenderness in right lower back   Skin:     Findings: No erythema or rash.   Neurological:      Mental Status: He is oriented to person, place, and time.         Assessment:       1. Chronic right-sided low back pain without sciatica    2. Rib pain on right side        Plan:       1. Chronic " right-sided low back pain without sciatica  Likely DJD vs other etiology   - Urinalysis; Future  - Urine culture; Future  - CBC Auto Differential; Future  - Comprehensive Metabolic Panel; Future  - X-Ray Lumbar Spine AP And Lateral; Future    2. Rib pain on right side  - X-Ray Ribs 2 View Right; Future     Patient with be reevaluated in  as  needed  or sooner tami Wheeler NP

## 2024-01-16 ENCOUNTER — TELEPHONE (OUTPATIENT)
Dept: CARDIOLOGY | Facility: CLINIC | Age: 84
End: 2024-01-16
Payer: MEDICARE

## 2024-01-16 DIAGNOSIS — N39.0 URINARY TRACT INFECTION WITHOUT HEMATURIA, SITE UNSPECIFIED: Primary | ICD-10-CM

## 2024-01-16 RX ORDER — NITROFURANTOIN 25; 75 MG/1; MG/1
100 CAPSULE ORAL 2 TIMES DAILY
Qty: 14 CAPSULE | Refills: 0 | Status: SHIPPED | OUTPATIENT
Start: 2024-01-16 | End: 2024-01-23

## 2024-01-16 NOTE — TELEPHONE ENCOUNTER
----- Message from Christina Aguiar, Patient Care Assistant sent at 1/16/2024 11:17 AM CST -----  Contact: Genie wife  Genie is req a order for julio SMALL. Please call back to advise [phone]  Thanks

## 2024-01-22 ENCOUNTER — TELEPHONE (OUTPATIENT)
Dept: FAMILY MEDICINE | Facility: CLINIC | Age: 84
End: 2024-01-22
Payer: MEDICARE

## 2024-01-22 NOTE — TELEPHONE ENCOUNTER
----- Message from Floyd Nash sent at 1/22/2024 10:47 AM CST -----  Regarding: pt call back  Name of Who is Calling:Pt wife         What is the request in detail: Requesting call back in regards to pain on right side and pt has no meds left. Wife stated what's the next step if  still is in pain   Please contact           Can the clinic reply by [facility]: no call pls         What Number to Call Back if not in [facility]:Telephone Information:  Mobile          [phone]

## 2024-01-22 NOTE — TELEPHONE ENCOUNTER
Returned call to patient and wife in regards to  still being in pain. Per Mrs Fofana, if no improvement patient should come into clinic to be re-evaluated. No answer, left voicemail to return call.

## 2024-01-25 ENCOUNTER — OFFICE VISIT (OUTPATIENT)
Dept: FAMILY MEDICINE | Facility: CLINIC | Age: 84
End: 2024-01-25
Payer: MEDICARE

## 2024-01-25 VITALS
WEIGHT: 181.19 LBS | SYSTOLIC BLOOD PRESSURE: 120 MMHG | BODY MASS INDEX: 25.37 KG/M2 | DIASTOLIC BLOOD PRESSURE: 68 MMHG | OXYGEN SATURATION: 97 % | TEMPERATURE: 99 F | HEART RATE: 84 BPM | HEIGHT: 71 IN

## 2024-01-25 DIAGNOSIS — G89.29 CHRONIC RIGHT-SIDED LOW BACK PAIN WITHOUT SCIATICA: Primary | ICD-10-CM

## 2024-01-25 DIAGNOSIS — R07.81 RIB PAIN ON RIGHT SIDE: ICD-10-CM

## 2024-01-25 DIAGNOSIS — M54.50 CHRONIC RIGHT-SIDED LOW BACK PAIN WITHOUT SCIATICA: Primary | ICD-10-CM

## 2024-01-25 PROCEDURE — 1125F AMNT PAIN NOTED PAIN PRSNT: CPT | Mod: HCNC,CPTII,S$GLB, | Performed by: NURSE PRACTITIONER

## 2024-01-25 PROCEDURE — 1101F PT FALLS ASSESS-DOCD LE1/YR: CPT | Mod: HCNC,CPTII,S$GLB, | Performed by: NURSE PRACTITIONER

## 2024-01-25 PROCEDURE — 99213 OFFICE O/P EST LOW 20 MIN: CPT | Mod: HCNC,S$GLB,, | Performed by: NURSE PRACTITIONER

## 2024-01-25 PROCEDURE — 3078F DIAST BP <80 MM HG: CPT | Mod: HCNC,CPTII,S$GLB, | Performed by: NURSE PRACTITIONER

## 2024-01-25 PROCEDURE — 1160F RVW MEDS BY RX/DR IN RCRD: CPT | Mod: HCNC,CPTII,S$GLB, | Performed by: NURSE PRACTITIONER

## 2024-01-25 PROCEDURE — 3074F SYST BP LT 130 MM HG: CPT | Mod: HCNC,CPTII,S$GLB, | Performed by: NURSE PRACTITIONER

## 2024-01-25 PROCEDURE — 1159F MED LIST DOCD IN RCRD: CPT | Mod: HCNC,CPTII,S$GLB, | Performed by: NURSE PRACTITIONER

## 2024-01-25 PROCEDURE — 3288F FALL RISK ASSESSMENT DOCD: CPT | Mod: HCNC,CPTII,S$GLB, | Performed by: NURSE PRACTITIONER

## 2024-01-25 PROCEDURE — 81003 URINALYSIS AUTO W/O SCOPE: CPT | Mod: HCNC | Performed by: NURSE PRACTITIONER

## 2024-01-25 PROCEDURE — 99999 PR PBB SHADOW E&M-EST. PATIENT-LVL V: CPT | Mod: PBBFAC,HCNC,, | Performed by: NURSE PRACTITIONER

## 2024-01-25 PROCEDURE — 87086 URINE CULTURE/COLONY COUNT: CPT | Mod: HCNC | Performed by: NURSE PRACTITIONER

## 2024-01-25 RX ORDER — DICLOFENAC SODIUM 10 MG/G
2 GEL TOPICAL DAILY
Qty: 450 G | Refills: 0 | Status: SHIPPED | OUTPATIENT
Start: 2024-01-25 | End: 2024-02-27

## 2024-01-25 NOTE — PROGRESS NOTES
Subjective:       Patient ID: Willie Liao is a 83 y.o. male.    Chief Complaint: Back Pain     HPI   84 y/o male patient with medical problems listed below presents for constant lingering right ribs and lower back pain. This is chronic for one year. Denies recent trauma to the affected area. Patient was seen in the clinic for the problem on 1/12. Urine culture grew small amount bacteria Enterococcus Faecalis and treated with Macrobid for 7 days. Right rib x-ray showed no acute rib fracture but showed old healed right posterior 7th and 8th rib fractures. Back spine x-ray showed degenerative disc space narrowing and remote Schmorl's nodes in L2, L3, L4, L5. He states the pain has been gradually improving after taking the antibiotic but likes to see the specialist.     Labs reviewed from 1/2024    Patient Active Problem List   Diagnosis    Carotid arterial disease    Gilbert's syndrome    Gastroesophageal reflux disease    Hyperlipidemia, mixed    Subclavian artery stenosis, left    Hyperglycemia    Postherpetic neuralgia    Ectropion of both lower eyelids    ASCVD (arteriosclerotic cardiovascular disease)      Review of patient's allergies indicates:   Allergen Reactions    No known drug allergies      Past Surgical History:   Procedure Laterality Date    arthroscope Left     knee arthroscopy, torn meniscus    COLONOSCOPY      Adrien    CORONARY ANGIOGRAPHY N/A 2/7/2022    Procedure: ANGIOGRAM, CORONARY ARTERY;  Surgeon: John Linn MD;  Location: [facility] CATH/EP LAB;  Service: Cardiology;  Laterality: N/A;    ECTROPION REPAIR Bilateral 3/13/2020    Procedure: REPAIR, ECTROPION, EYELID FLAPS - (Skin and/or Deep Tissue ;  Surgeon: Sarah Blackmon MD;  Location: [facility] OR [address] [place] [address];  Service: Ophthalmology;  Laterality: Bilateral;    LEFT HEART CATHETERIZATION Left 2/7/2022    Procedure: CATHETERIZATION, HEART, LEFT;  Surgeon: John Linn MD;  Location: [facility] CATH/EP LAB;  Service: Cardiology;   Laterality: Left;        Current Outpatient Medications:     amLODIPine (NORVASC) 2.5 MG tablet, TAKE 1 TABLET(2.5 MG) BY MOUTH EVERY DAY, Disp: 30 tablet, Rfl: 11    aspirin (ECOTRIN) 81 MG EC tablet, Take 81 mg by mouth every other day., Disp: , Rfl:     atorvastatin (LIPITOR) 20 MG tablet, TAKE 1 TABLET(20 MG) BY MOUTH EVERY DAY, Disp: 90 tablet, Rfl: 3    CALCIUM ORAL, Take 1 tablet by mouth once daily., Disp: , Rfl:     CYANOCOBALAMIN, VITAMIN B-12, (VITAMIN B-12 ORAL), Take 1 tablet by mouth once daily., Disp: , Rfl:     ezetimibe (ZETIA) 10 mg tablet, TAKE 1 TABLET(10 MG) BY MOUTH EVERY DAY, Disp: 90 tablet, Rfl: 3    glucosam/chond-msm1/C/angely/bor (GLUCOSAMINE-CHOND-MSM COMPLEX ORAL), Take 1 tablet by mouth once daily., Disp: , Rfl:     metoprolol succinate (TOPROL-XL) 25 MG 24 hr tablet, Take 1 tablet (25 mg total) by mouth once daily., Disp: 90 tablet, Rfl: 11    MULTIVITAMIN WITH MINERALS/LUT (MULTIVITAMIN 50 PLUS ORAL), Take 1 tablet by mouth once daily. , Disp: , Rfl:     omega-3 fatty acids/fish oil (FISH OIL-OMEGA-3 FATTY ACIDS) 300-1,000 mg capsule, Take by mouth once daily., Disp: , Rfl:     pantoprazole (PROTONIX) 40 MG tablet, Take 40 mg by mouth once daily., Disp: , Rfl:     vitamin A 93587 UNIT capsule, Take 10,000 Units by mouth once daily., Disp: , Rfl:     VITAMIN C 500 MG tablet, , Disp: , Rfl:     zinc gluconate 50 mg tablet, Take 50 mg by mouth once daily., Disp: , Rfl:     diclofenac sodium (VOLTAREN) 1 % Gel, Apply 2 g topically once daily., Disp: 450 g, Rfl: 0    nitroGLYCERIN (NITROSTAT) 0.4 MG SL tablet, Place 1 tablet (0.4 mg total) under the tongue every 5 (five) minutes as needed for Chest pain., Disp: 25 tablet, Rfl: 6  No current facility-administered medications for this visit.    Facility-Administered Medications Ordered in Other Visits:     tetracaine HCL 0.5% ophthalmic solution 1 drop, 1 drop, Both Eyes, Once, Peri Galeana MD    Review of Systems   Constitutional:   "Negative for chills and fever.   Respiratory:  Negative for cough, chest tightness and shortness of breath.    Cardiovascular:  Negative for chest pain and palpitations.   Gastrointestinal:  Negative for abdominal pain.   Genitourinary:  Negative for dysuria.   Musculoskeletal:  Positive for back pain.   Neurological:  Negative for dizziness and headaches.       Objective:   /68 (BP Location: Right arm, Patient Position: Sitting, BP Method: Medium (Manual))   Pulse 84   Temp 98.6 °F (37 °C) (Oral)   Ht 5' 11" (1.803 m)   Wt 82.2 kg (181 lb 3.5 oz)   SpO2 97%   BMI 25.27 kg/m²         Physical Exam  Vitals reviewed.   Constitutional:       General: He is not in acute distress.     Appearance: Normal appearance.   Cardiovascular:      Rate and Rhythm: Normal rate and regular rhythm.      Pulses: Normal pulses.      Heart sounds: Normal heart sounds.   Pulmonary:      Effort: Pulmonary effort is normal.      Breath sounds: Normal breath sounds.   Chest:      Chest wall: No deformity, swelling or edema.   Abdominal:      General: Abdomen is flat. Bowel sounds are normal.      Palpations: Abdomen is soft.      Tenderness: There is no abdominal tenderness.   Musculoskeletal:      Cervical back: Normal range of motion.      Comments: +tenderness in right lower back    Skin:     Findings: No erythema or rash.   Neurological:      Mental Status: He is oriented to person, place, and time.         Assessment:       1. Chronic right-sided low back pain without sciatica    2. Rib pain on right side        Plan:       1. Chronic right-sided low back pain without sciatica  - Urinalysis  - Urine culture  - diclofenac sodium (VOLTAREN) 1 % Gel; Apply 2 g topically once daily.  Dispense: 450 g; Refill: 0  - Ambulatory referral/consult to [facility]; Future    2. Rib pain on right side  - diclofenac sodium (VOLTAREN) 1 % Gel; Apply 2 g topically once daily.  Dispense: 450 g; Refill: 0     Patient with be " reevaluated in  as needed  or sooner prn  Summer NP

## 2024-01-26 LAB
BILIRUB UR QL STRIP: NEGATIVE
CLARITY UR REFRACT.AUTO: CLEAR
COLOR UR AUTO: YELLOW
GLUCOSE UR QL STRIP: ABNORMAL
HGB UR QL STRIP: NEGATIVE
KETONES UR QL STRIP: NEGATIVE
LEUKOCYTE ESTERASE UR QL STRIP: NEGATIVE
NITRITE UR QL STRIP: NEGATIVE
PH UR STRIP: 6 [PH] (ref 5–8)
PROT UR QL STRIP: NEGATIVE
SP GR UR STRIP: 1.02 (ref 1–1.03)
URN SPEC COLLECT METH UR: ABNORMAL

## 2024-01-27 LAB — BACTERIA UR CULT: NORMAL

## 2024-01-31 ENCOUNTER — OFFICE VISIT (OUTPATIENT)
Dept: SPINE | Facility: CLINIC | Age: 84
End: 2024-01-31
Payer: MEDICARE

## 2024-01-31 VITALS — WEIGHT: 181.19 LBS | HEIGHT: 71 IN | BODY MASS INDEX: 25.37 KG/M2

## 2024-01-31 DIAGNOSIS — G89.29 CHRONIC RIGHT-SIDED LOW BACK PAIN WITHOUT SCIATICA: ICD-10-CM

## 2024-01-31 DIAGNOSIS — M54.50 CHRONIC RIGHT-SIDED LOW BACK PAIN WITHOUT SCIATICA: ICD-10-CM

## 2024-01-31 DIAGNOSIS — M54.9 DORSALGIA, UNSPECIFIED: Primary | ICD-10-CM

## 2024-01-31 PROCEDURE — 1126F AMNT PAIN NOTED NONE PRSNT: CPT | Mod: HCNC,CPTII,S$GLB, | Performed by: PHYSICAL MEDICINE & REHABILITATION

## 2024-01-31 PROCEDURE — 1160F RVW MEDS BY RX/DR IN RCRD: CPT | Mod: HCNC,CPTII,S$GLB, | Performed by: PHYSICAL MEDICINE & REHABILITATION

## 2024-01-31 PROCEDURE — 3288F FALL RISK ASSESSMENT DOCD: CPT | Mod: HCNC,CPTII,S$GLB, | Performed by: PHYSICAL MEDICINE & REHABILITATION

## 2024-01-31 PROCEDURE — 99204 OFFICE O/P NEW MOD 45 MIN: CPT | Mod: HCNC,S$GLB,, | Performed by: PHYSICAL MEDICINE & REHABILITATION

## 2024-01-31 PROCEDURE — 1101F PT FALLS ASSESS-DOCD LE1/YR: CPT | Mod: HCNC,CPTII,S$GLB, | Performed by: PHYSICAL MEDICINE & REHABILITATION

## 2024-01-31 PROCEDURE — 1159F MED LIST DOCD IN RCRD: CPT | Mod: HCNC,CPTII,S$GLB, | Performed by: PHYSICAL MEDICINE & REHABILITATION

## 2024-01-31 NOTE — PROGRESS NOTES
SUBJECTIVE:    Patient ID: Willie Liao is a 83 y.o. male.    Chief Complaint: Follow-up    This is an 83-year-old man who sees Dr. Luz for his primary care.  History of hypertension and hyperlipidemia otherwise denies any chronic major medical problems.  No cancer history.  Presents with approximately 2 month history of spontaneously occurring right mid to upper lumbar pain which is worse with certain movements especially extension of the right arm.  No radicular leg symptoms.  No bowel or bladder dysfunction fever chills sweats or unexpected weight loss.  Saw primary care on 2024.  Had a urinary tract infection that was treated.  Symptoms improved with treatment of the UTI.  X-rays of the ribs done 2024 showed old posterior right 7th and 8th ribs fractures.  I reviewed x-rays of the lumbar spine which show overall mild degenerative changes of the lumbar spine.  Is currently not having any pain.  At times his symptoms are as bad as 3/10 and interfere with his quality of life in terms of activities of daily living recreation and social activities.          Past Medical History:   Diagnosis Date    Carotid stenosis     Coronary artery disease     Gastritis     GERD (gastroesophageal reflux disease)     H. pylori infection     Hyperlipidemia     Shingles      Social History     Socioeconomic History    Marital status:    Tobacco Use    Smoking status: Former     Current packs/day: 0.00     Types: Cigarettes     Quit date: 1997     Years since quittin.5    Smokeless tobacco: Never   Substance and Sexual Activity    Alcohol use: No    Drug use: No    Sexual activity: Yes     Social Determinants of Health     Financial Resource Strain: Low Risk  (2023)    Overall Financial Resource Strain (CARDIA)     Difficulty of Paying Living Expenses: Not hard at all   Food Insecurity: No Food Insecurity (2023)    Hunger Vital Sign     Worried About Running Out of Food in the Last  Year: Never true     Ran Out of Food in the Last Year: Never true   Transportation Needs: No Transportation Needs (5/30/2023)    PRAPARE - Transportation     Lack of Transportation (Medical): No     Lack of Transportation (Non-Medical): No   Physical Activity: Sufficiently Active (5/30/2023)    Exercise Vital Sign     Days of Exercise per Week: 3 days     Minutes of Exercise per Session: 60 min   Stress: No Stress Concern Present (5/30/2023)    [place] [employer]ational [employer] - Occupational Stress Questionnaire     Feeling of Stress : Not at all   Social Connections: Moderately Integrated (5/30/2023)    Social Connection and Isolation Panel [NHANES]     Frequency of Communication with Friends and Family: Twice a week     Frequency of Social Gatherings with Friends and Family: Three times a week     Attends Catholic Services: More than 4 times per year     Active Member of Clubs or Organizations: No     Attends Club or Organization Meetings: Never     Marital Status:    Housing Stability: Low Risk  (5/30/2023)    Housing Stability Vital Sign     Unable to Pay for Housing in the Last Year: No     Number of Places Lived in the Last Year: 1     Unstable Housing in the Last Year: No     Past Surgical History:   Procedure Laterality Date    arthroscope Left     knee arthroscopy, torn meniscus    COLONOSCOPY      Adrien    CORONARY ANGIOGRAPHY N/A 2/7/2022    Procedure: ANGIOGRAM, CORONARY ARTERY;  Surgeon: John Linn MD;  Location: [facility] CATH/EP LAB;  Service: Cardiology;  Laterality: N/A;    ECTROPION REPAIR Bilateral 3/13/2020    Procedure: REPAIR, ECTROPION, EYELID FLAPS - (Skin and/or Deep Tissue ;  Surgeon: Sarah Blackmon MD;  Location: [address];  Service: Ophthalmology;  Laterality: Bilateral;    LEFT HEART CATHETERIZATION Left 2/7/2022    Procedure: CATHETERIZATION, HEART, LEFT;  Surgeon: John Linn MD;  Location: [facility] CATH/EP LAB;  Service: Cardiology;  Laterality: Left;  "    Family History   Problem Relation Age of Onset    Heart disease Father 65        MI    Hyperlipidemia Father     Stroke Father     COPD Mother     Glaucoma Mother     No Known Problems Sister     No Known Problems Brother     No Known Problems Daughter     Diabetes Unknown         uncles  and aunts     Cancer Paternal Uncle         prostate cancer    Melanoma Neg Hx      Vitals:    01/31/24 0900   Weight: 82.2 kg (181 lb 3.5 oz)   Height: 5' 11" (1.803 m)       Review of Systems   Constitutional:  Negative for chills, diaphoresis, fatigue, fever and unexpected weight change.   HENT:  Negative for trouble swallowing.    Eyes:  Negative for visual disturbance.   Respiratory:  Negative for shortness of breath.    Cardiovascular:  Negative for chest pain.   Gastrointestinal:  Negative for abdominal pain, constipation, diarrhea, nausea and vomiting.   Genitourinary:  Negative for difficulty urinating.   Musculoskeletal:  Negative for arthralgias, back pain, gait problem, joint swelling, myalgias, neck pain and neck stiffness.   Neurological:  Negative for dizziness, speech difficulty, weakness, light-headedness, numbness and headaches.          Objective:      Physical Exam  Neurological:      Mental Status: He is alert and oriented to person, place, and time.      Comments: He is awake and in no acute distress  No point tenderness or palpable masses about the lumbar spine  Forward flexion of the lumbar spine is normal and painless.  Extension combined with rotation to the right causes mild pain in the mid lumbar region  Reflexes- +1-+2 reflexes at the following:   C5-Biceps   C6-Brachioradialis   C7-Triceps   L3/4-Patellar   S1-Achilles   Pina sign is negative bilaterally  Strength testing- 5/5 strength in the following muscle groups:  C5-Elbow flexion  C6-Wrist extension  C7-Elbow extension  C8-Finger flexion  T1-Finger abduction  L2-Hip flexion  L3-Knee extension  L4-Ankle dorsiflexion  L5-Great toe " extension  S1-Ankle plantar flexion                    Assessment:       1. Dorsalgia, unspecified    2. Chronic right-sided low back pain without sciatica           Plan:     He has a nonfocal neurological examination and no historical red flags.  I suspect has low back pain on basis of degenerative disc disease and facet arthropathy.  Has pain with facet loading.  He is concerned with his ongoing symptoms.  Offered him physical therapy which he politely declines.  I recommend MRI of the lumbar spine.  He may be a candidate for medial branch block/radiofrequency ablation.  He does have pain with facet loading.  He can follow up with me after the scan      Dorsalgia, unspecified  -     MRI Lumbar Spine Without Contrast; Future; Expected date: 01/31/2024    Chronic right-sided low back pain without sciatica  -     Ambulatory referral[facility]consult to [facility]

## 2024-02-08 ENCOUNTER — OFFICE VISIT (OUTPATIENT)
Dept: DERMATOLOGY | Facility: CLINIC | Age: 84
End: 2024-02-08
Payer: MEDICARE

## 2024-02-08 VITALS — HEIGHT: 71 IN | BODY MASS INDEX: 25.34 KG/M2 | WEIGHT: 181 LBS

## 2024-02-08 DIAGNOSIS — L57.0 ACTINIC KERATOSES: ICD-10-CM

## 2024-02-08 DIAGNOSIS — D18.01 CHERRY ANGIOMA: ICD-10-CM

## 2024-02-08 DIAGNOSIS — L57.8 OTHER SKIN CHANGES DUE TO CHRONIC EXPOSURE TO NONIONIZING RADIATION: ICD-10-CM

## 2024-02-08 DIAGNOSIS — L82.1 SEBORRHEIC KERATOSES: ICD-10-CM

## 2024-02-08 DIAGNOSIS — D22.9 MULTIPLE BENIGN NEVI: ICD-10-CM

## 2024-02-08 DIAGNOSIS — D48.5 NEOPLASM OF UNCERTAIN BEHAVIOR OF SKIN: Primary | ICD-10-CM

## 2024-02-08 DIAGNOSIS — L81.4 SOLAR LENTIGO: ICD-10-CM

## 2024-02-08 PROCEDURE — 1159F MED LIST DOCD IN RCRD: CPT | Mod: CPTII,S$GLB,, | Performed by: DERMATOLOGY

## 2024-02-08 PROCEDURE — 88305 TISSUE EXAM BY PATHOLOGIST: CPT | Mod: 26,,, | Performed by: DERMATOLOGY

## 2024-02-08 PROCEDURE — 3288F FALL RISK ASSESSMENT DOCD: CPT | Mod: CPTII,S$GLB,, | Performed by: DERMATOLOGY

## 2024-02-08 PROCEDURE — 1101F PT FALLS ASSESS-DOCD LE1/YR: CPT | Mod: CPTII,S$GLB,, | Performed by: DERMATOLOGY

## 2024-02-08 PROCEDURE — 17003 DESTRUCT PREMALG LES 2-14: CPT | Mod: S$GLB,,, | Performed by: DERMATOLOGY

## 2024-02-08 PROCEDURE — 1160F RVW MEDS BY RX/DR IN RCRD: CPT | Mod: CPTII,S$GLB,, | Performed by: DERMATOLOGY

## 2024-02-08 PROCEDURE — 1126F AMNT PAIN NOTED NONE PRSNT: CPT | Mod: CPTII,S$GLB,, | Performed by: DERMATOLOGY

## 2024-02-08 PROCEDURE — 88305 TISSUE EXAM BY PATHOLOGIST: CPT | Mod: HCNC | Performed by: DERMATOLOGY

## 2024-02-08 PROCEDURE — 17000 DESTRUCT PREMALG LESION: CPT | Mod: XS,S$GLB,, | Performed by: DERMATOLOGY

## 2024-02-08 PROCEDURE — 11102 TANGNTL BX SKIN SINGLE LES: CPT | Mod: S$GLB,,, | Performed by: DERMATOLOGY

## 2024-02-08 PROCEDURE — 99213 OFFICE O/P EST LOW 20 MIN: CPT | Mod: 25,S$GLB,, | Performed by: DERMATOLOGY

## 2024-02-08 NOTE — PROGRESS NOTES
Subjective:      Patient ID:  Willie Liao is a 83 y.o. male who presents for   Chief Complaint   Patient presents with    Skin Check     tbsc     LOV- 6/27/23- ak, nevi, angioma, sk    Here today for a TBSC    Has no hx of NMSC  Ak's treated with cryo   8/16/2019 AK R helix   8/16/2019 AK R dorsal wrist   Has no fhx of MM    Current Outpatient Medications:   ·  amLODIPine (NORVASC) 2.5 MG tablet, TAKE 1 TABLET(2.5 MG) BY MOUTH EVERY DAY, Disp: 30 tablet, Rfl: 11  ·  aspirin (ECOTRIN) 81 MG EC tablet, Take 81 mg by mouth every other day., Disp: , Rfl:   ·  atorvastatin (LIPITOR) 20 MG tablet, TAKE 1 TABLET(20 MG) BY MOUTH EVERY DAY, Disp: 90 tablet, Rfl: 3  ·  CALCIUM ORAL, Take 1 tablet by mouth once daily., Disp: , Rfl:   ·  CYANOCOBALAMIN, VITAMIN B-12, (VITAMIN B-12 ORAL), Take 1 tablet by mouth once daily., Disp: , Rfl:   ·  diclofenac sodium (VOLTAREN) 1 % Gel, Apply 2 g topically once daily., Disp: 450 g, Rfl: 0  ·  ezetimibe (ZETIA) 10 mg tablet, TAKE 1 TABLET(10 MG) BY MOUTH EVERY DAY, Disp: 90 tablet, Rfl: 3  ·  glucosam/chond-msm1/C/angely/bor (GLUCOSAMINE-CHOND-MSM COMPLEX ORAL), Take 1 tablet by mouth once daily., Disp: , Rfl:   ·  metoprolol succinate (TOPROL-XL) 25 MG 24 hr tablet, Take 1 tablet (25 mg total) by mouth once daily., Disp: 90 tablet, Rfl: 11  ·  MULTIVITAMIN WITH MINERALS/LUT (MULTIVITAMIN 50 PLUS ORAL), Take 1 tablet by mouth once daily. , Disp: , Rfl:   ·  omega-3 fatty acids/fish oil (FISH OIL-OMEGA-3 FATTY ACIDS) 300-1,000 mg capsule, Take by mouth once daily., Disp: , Rfl:   ·  pantoprazole (PROTONIX) 40 MG tablet, Take 40 mg by mouth once daily., Disp: , Rfl:   ·  vitamin A 79290 UNIT capsule, Take 10,000 Units by mouth once daily., Disp: , Rfl:   ·  VITAMIN C 500 MG tablet, , Disp: , Rfl:   ·  zinc gluconate 50 mg tablet, Take 50 mg by mouth once daily., Disp: , Rfl:   ·  nitroGLYCERIN (NITROSTAT) 0.4 MG SL tablet, Place 1 tablet (0.4 mg total) under the tongue every 5 (five)  minutes as needed for Chest pain., Disp: 25 tablet, Rfl: 6        Review of Systems   Constitutional:  Negative for fever, chills and fatigue.   Respiratory:  Negative for cough and shortness of breath.    Skin:  Positive for activity-related sunscreen use and wears hat. Negative for rash and daily sunscreen use.   Hematologic/Lymphatic: Does not bruise/bleed easily.       Objective:   Physical Exam   Constitutional: He appears well-developed and well-nourished. No distress.   Neurological: He is alert and oriented to person, place, and time. He is not disoriented.   Psychiatric: He has a normal mood and affect.   Skin:   Areas Examined (abnormalities noted in diagram):   Scalp / Hair Palpated and Inspected  Head / Face Inspection Performed  Neck Inspection Performed  Chest / Axilla Inspection Performed  Abdomen Inspection Performed  Back Inspection Performed  RUE Inspected  LUE Inspection Performed  Nails and Digits Inspection Performed                     Diagram Legend     Erythematous scaling macule/papule c/w actinic keratosis       Vascular papule c/w angioma      Pigmented verrucoid papule/plaque c/w seborrheic keratosis      Yellow umbilicated papule c/w sebaceous hyperplasia      Irregularly shaped tan macule c/w lentigo     1-2 mm smooth white papules consistent with Milia      Movable subcutaneous cyst with punctum c/w epidermal inclusion cyst      Subcutaneous movable cyst c/w pilar cyst      Firm pink to brown papule c/w dermatofibroma      Pedunculated fleshy papule(s) c/w skin tag(s)      Evenly pigmented macule c/w junctional nevus     Mildly variegated pigmented, slightly irregular-bordered macule c/w mildly atypical nevus      Flesh colored to evenly pigmented papule c/w intradermal nevus       Pink pearly papule/plaque c/w basal cell carcinoma      Erythematous hyperkeratotic cursted plaque c/w SCC      Surgical scar with no sign of skin cancer recurrence      Open and closed comedones       Inflammatory papules and pustules      Verrucoid papule consistent consistent with wart     Erythematous eczematous patches and plaques     Dystrophic onycholytic nail with subungual debris c/w onychomycosis     Umbilicated papule    Erythematous-base heme-crusted tan verrucoid plaque consistent with inflamed seborrheic keratosis     Erythematous Silvery Scaling Plaque c/w Psoriasis     See annotation      Assessment / Plan:      Pathology Orders:       Normal Orders This Visit    Specimen to Pathology, Dermatology     Comments:    Number of Specimens:->1  ------------------------->-------------------------  Spec 1 Procedure:->Biopsy  Spec 1 Clinical Impression:->SCC vs BCC vs other  Spec 1 Source:->R helix rim    Questions:    Procedure Type: Dermatology and skin neoplasms    Number of Specimens: 1    ------------------------: -------------------------    Spec 1 Procedure: Biopsy    Spec 1 Clinical Impression: SCC vs BCC vs other    Spec 1 Source: R helix [facility]    Release to patient:           Neoplasm of uncertain behavior of skin  -     Specimen to Pathology, Dermatology  Shave biopsy procedure note:    Shave biopsy performed after verbal consent including risk of infection, scar, recurrence, need for additional treatment of site. Area prepped with alcohol, anesthetized with approximately 1.0cc of 1% lidocaine with epinephrine. Lesional tissue shaved with razor blade. Hemostasis achieved with application of aluminum chloride followed by hyfrecation. No complications. Dressing applied. Wound care explained.    Actinic keratoses  Cryosurgery Procedure Note    Verbal consent from the patient is obtained and the patient is aware of the precancerous quality and need for treatment of these lesions. Liquid nitrogen cryosurgery is applied to the 3 actinic keratoses, as detailed in the physical exam, to produce a freeze injury. The patient is aware that blisters may form and is instructed on wound care with gentle cleansing  and use of vaseline ointment to keep moist until healed. The patient is supplied a handout on cryosurgery and is instructed to call if lesions do not completely resolve.    Seborrheic keratoses  These are benign inherited growths without a malignant potential. Reassurance given to patient. No treatment is necessary.     Multiple benign nevi  Careful dermoscopy evaluation of nevi performed with none identified as needing biopsy today  Monitor for new mole or moles that are becoming bigger, darker, irritated, or developing irregular borders.     Cherry angioma  This is a benign vascular lesion. Reassurance given. No treatment required.     Solar lentigo  This is a benign hyperpigmented sun induced lesion. Daily sun protection will reduce the number of new lesions. Treatment of these benign lesions are considered cosmetic.    Other skin changes due to chronic exposure to nonionizing radiation  Patient instructed in importance in daily broad spectrum sun protection of at least spf 30. Mineral sunscreen ingredients preferred (Zinc +/- Titanium) and can be found OTC.   Recommend Elta MD for daily use on face and neck.  Patient encouraged to wear hat for all outdoor exposure.   Also discussed sun avoidance and use of protective clothing.             Follow up in about 6 months (around 8/8/2024), or if symptoms worsen or fail to improve.

## 2024-02-08 NOTE — PATIENT INSTRUCTIONS
Shave Biopsy Wound Care    Your doctor has performed a shave biopsy today.  A band aid and vaseline ointment has been placed over the site.  This should remain in place for 24 hours.  It is recommended that you keep the area dry for the first 24 hours.  After 24 hours, you may remove the band aid and wash the area with warm soap and water and apply Vaseline jelly.  Many patients prefer to use Neosporin or Bacitracin ointment.  This is acceptable; however, know that you can develop an allergy to this medication even if you have used it safely for years.  It is important to keep the area moist.  Letting it dry out and get air slows healing time, and will worsen the scar.  Band aid is optional after first 24 hours.      If you notice increasing redness, tenderness, pain, or yellow drainage at the biopsy site, please notify your doctor.  These are signs of an infection.    If your biopsy site is bleeding, apply firm pressure for 15 minutes straight.  Repeat for another 15 minutes, if it is still bleeding.   If the surgical site continues to bleed, then please contact your doctor.       [facility] - DERMATOLOGY  [address], [address]  [place] [address]  Dept: [phone]  Dept Fax: [phone]      CRYOSURGERY      Your doctor has used a method called cryosurgery to treat your skin condition. Cryosurgery refers to the use of very cold substances to treat a variety of skin conditions such as warts, pre-skin cancers, molluscum contagiosum, sun spots, and several benign growths. The substance we use in cryosurgery is liquid nitrogen and is so cold (-195 degrees Celsius) that is burns when administered.     Following treatment in the office, the skin may immediately burn and become red. You may find the area around the lesion is affected as well. It is sometimes necessary to treat not only the lesion, but a small area of the surrounding normal skin to achieve a good response.     A  blister, and even a blood filled blister, may form after treatment.   This is a normal response. If the blister is painful, it is acceptable to sterilize a needle and with rubbing alcohol and gently pop the blister. It is important that you gently wash the area with soap and warm water as the blister fluid may contain wart virus if a wart was treated. Do no remove the roof of the blister.     The area treated can take anywhere from 1-3 weeks to heal. Healing time depends on the kind skin lesion treated, the location, and how aggressively the lesion was treated. It is recommended that the areas treated are covered with Vaseline or bacitracin ointment and a band-aid. If a band-aid is not practical, just ointment applied several times per day will do. Keeping these areas moist will speed the healing time.    Treatment with liquid nitrogen can leave a scar. In dark skin, it may be a light or dark scar, in light skin it may be a white or pink scar. These will generally fade with time, but may never go away completely.     If you have any concerns after your treatment, please feel free to call the office.         [facility] - DERMATOLOGY  [address], [address]  [place] [address]  Dept: [phone]  Dept Fax: [phone]

## 2024-02-09 ENCOUNTER — TELEPHONE (OUTPATIENT)
Dept: DERMATOLOGY | Facility: CLINIC | Age: 84
End: 2024-02-09
Payer: MEDICARE

## 2024-02-09 NOTE — TELEPHONE ENCOUNTER
----- Message from Solange Vinson sent at 2/9/2024 12:15 PM CST -----  Type: Needs Medical Advice  Who Called:  NEGIN JEREZ, wife  Best Call Back Number: [phone] (home)   Additional Information: requesting a call back to see if they need to come back for a follow up from procedure.

## 2024-02-14 LAB
FINAL PATHOLOGIC DIAGNOSIS: NORMAL
GROSS: NORMAL
Lab: NORMAL
MICROSCOPIC EXAM: NORMAL

## 2024-02-20 ENCOUNTER — HOSPITAL ENCOUNTER (OUTPATIENT)
Dept: RADIOLOGY | Facility: HOSPITAL | Age: 84
Discharge: HOME | End: 2024-02-20
Attending: PHYSICAL MEDICINE & REHABILITATION
Payer: MEDICARE

## 2024-02-20 DIAGNOSIS — M54.9 DORSALGIA, UNSPECIFIED: ICD-10-CM

## 2024-02-20 PROCEDURE — 72148 MRI LUMBAR SPINE W/O DYE: CPT | Mod: TC,PO

## 2024-02-23 ENCOUNTER — OFFICE VISIT (OUTPATIENT)
Dept: SPINE | Facility: CLINIC | Age: 84
End: 2024-02-23
Payer: MEDICARE

## 2024-02-23 VITALS — BODY MASS INDEX: 25.34 KG/M2 | WEIGHT: 181 LBS | HEIGHT: 71 IN

## 2024-02-23 DIAGNOSIS — M54.50 CHRONIC RIGHT-SIDED LOW BACK PAIN WITHOUT SCIATICA: Primary | ICD-10-CM

## 2024-02-23 DIAGNOSIS — G89.29 CHRONIC RIGHT-SIDED LOW BACK PAIN WITHOUT SCIATICA: Primary | ICD-10-CM

## 2024-02-23 PROCEDURE — 99213 OFFICE O/P EST LOW 20 MIN: CPT | Mod: HCNC,S$GLB,, | Performed by: PHYSICAL MEDICINE & REHABILITATION

## 2024-02-23 PROCEDURE — 1101F PT FALLS ASSESS-DOCD LE1/YR: CPT | Mod: HCNC,CPTII,S$GLB, | Performed by: PHYSICAL MEDICINE & REHABILITATION

## 2024-02-23 PROCEDURE — 1159F MED LIST DOCD IN RCRD: CPT | Mod: HCNC,CPTII,S$GLB, | Performed by: PHYSICAL MEDICINE & REHABILITATION

## 2024-02-23 PROCEDURE — 3288F FALL RISK ASSESSMENT DOCD: CPT | Mod: HCNC,CPTII,S$GLB, | Performed by: PHYSICAL MEDICINE & REHABILITATION

## 2024-02-23 PROCEDURE — 1160F RVW MEDS BY RX/DR IN RCRD: CPT | Mod: HCNC,CPTII,S$GLB, | Performed by: PHYSICAL MEDICINE & REHABILITATION

## 2024-02-23 PROCEDURE — 1126F AMNT PAIN NOTED NONE PRSNT: CPT | Mod: HCNC,CPTII,S$GLB, | Performed by: PHYSICAL MEDICINE & REHABILITATION

## 2024-02-23 PROCEDURE — 99999 PR PBB SHADOW E&M-EST. PATIENT-LVL III: CPT | Mod: PBBFAC,HCNC,, | Performed by: PHYSICAL MEDICINE & REHABILITATION

## 2024-02-23 NOTE — PROGRESS NOTES
SUBJECTIVE:    Patient ID: Willie Liao is a 83 y.o. male.    Chief Complaint: Back Pain    He is here to review his lumbar MRI done 2024 to evaluate his complaint of right-sided low back pain without radicular symptoms.      The MRI is summarized below:    FINDINGS: Multiplanar noncontrast imaging was performed. There is no evidence of lumbar fracture or subluxation. No osseous destructive lesion is identified. Marrow signal is heterogeneous. Signal within the conus medullaris is within normal limits.     T12-L1, L1-2: No significant abnormalities.     L2-3: Mild broad-based disc bulge results in mild narrowing of the spinal canal and foramina without nerve root impingement.     L3-4: Mild broad-based disc bulge and bilateral degenerative facet hypertrophy result in mild narrowing of the spinal canal and foramina without nerve root impingement.     L4-5: Mild broad-based disc bulge and bilateral facet hypertrophy combine to result in mild spinal stenosis and bilateral foraminal narrowing, without evidence of direct nerve root impingement.     L5-S1: Mild broad-based disc bulge and facet hypertrophy are noted, without significant central canal or foraminal stenosis.     IMPRESSION:  1. Multilevel lumbar degenerative disc/facet disease, without high-grade spinal stenosis or evidence of direct nerve root impingement. Please see details as noted at each individual level.      Clinically he is better.  Pain has resolved.  No new or progressive problems              Past Medical History:   Diagnosis Date    Carotid stenosis     Coronary artery disease     Gastritis     GERD (gastroesophageal reflux disease)     H. pylori infection     Hyperlipidemia     Shingles      Social History     Socioeconomic History    Marital status:    Tobacco Use    Smoking status: Former     Current packs/day: 0.00     Types: Cigarettes     Quit date: 1997     Years since quittin.6    Smokeless tobacco: Never    Substance and Sexual Activity    Alcohol use: No    Drug use: No    Sexual activity: Yes     Social Determinants of Health     Financial Resource Strain: Low Risk  (5/30/2023)    Overall Financial Resource Strain (CARDIA)     Difficulty of Paying Living Expenses: Not hard at all   Food Insecurity: No Food Insecurity (5/30/2023)    Hunger Vital Sign     Worried About Running Out of Food in the Last Year: Never true     Ran Out of Food in the Last Year: Never true   Transportation Needs: No Transportation Needs (5/30/2023)    PRAPARE - Transportation     Lack of Transportation (Medical): No     Lack of Transportation (Non-Medical): No   Physical Activity: Sufficiently Active (5/30/2023)    Exercise Vital Sign     Days of Exercise per Week: 3 days     Minutes of Exercise per Session: 60 min   Stress: No Stress Concern Present (5/30/2023)    [place] of [employer]ational [employer] - Occupational Stress Questionnaire     Feeling of Stress : Not at all   Social Connections: Moderately Integrated (5/30/2023)    Social Connection and Isolation Panel [NHANES]     Frequency of Communication with Friends and Family: Twice a week     Frequency of Social Gatherings with Friends and Family: Three times a week     Attends Orthodox Services: More than 4 times per year     Active Member of Clubs or Organizations: No     Attends Club or Organization Meetings: Never     Marital Status:    Housing Stability: Low Risk  (5/30/2023)    Housing Stability Vital Sign     Unable to Pay for Housing in the Last Year: No     Number of Places Lived in the Last Year: 1     Unstable Housing in the Last Year: No     Past Surgical History:   Procedure Laterality Date    arthroscope Left     knee arthroscopy, torn meniscus    COLONOSCOPY      Adrien    CORONARY ANGIOGRAPHY N/A 2/7/2022    Procedure: ANGIOGRAM, CORONARY ARTERY;  Surgeon: John Linn MD;  Location: [facility] CATH/EP LAB;  Service: Cardiology;  Laterality: N/A;     "ECTROPION REPAIR Bilateral 3/13/2020    Procedure: REPAIR, ECTROPION, EYELID FLAPS - (Skin and/or Deep Tissue ;  Surgeon: Sarah Blackmon MD;  Location: [facility] OR [address] [place] [address];  Service: Ophthalmology;  Laterality: Bilateral;    LEFT HEART CATHETERIZATION Left 2/7/2022    Procedure: CATHETERIZATION, HEART, LEFT;  Surgeon: John Linn MD;  Location: [facility] CATH/EP LAB;  Service: Cardiology;  Laterality: Left;     Family History   Problem Relation Age of Onset    Heart disease Father 65        MI    Hyperlipidemia Father     Stroke Father     COPD Mother     Glaucoma Mother     No Known Problems Sister     No Known Problems Brother     No Known Problems Daughter     Diabetes Unknown         uncles  and aunts     Cancer Paternal Uncle         prostate cancer    Melanoma Neg Hx      Vitals:    02/23/24 1528   Weight: 82.1 kg (181 lb)   Height: 5' 10.98" (1.803 m)       Review of Systems   Constitutional:  Negative for chills, diaphoresis, fatigue, fever and unexpected weight change.   HENT:  Negative for trouble swallowing.    Eyes:  Negative for visual disturbance.   Respiratory:  Negative for shortness of breath.    Cardiovascular:  Negative for chest pain.   Gastrointestinal:  Negative for abdominal pain, constipation, diarrhea, nausea and vomiting.   Genitourinary:  Negative for difficulty urinating.   Musculoskeletal:  Negative for arthralgias, back pain, gait problem, joint swelling, myalgias, neck pain and neck stiffness.   Neurological:  Negative for dizziness, speech difficulty, weakness, light-headedness, numbness and headaches.          Objective:      Physical Exam  Neurological:      Mental Status: He is alert and oriented to person, place, and time.             Assessment:       1. Chronic right-sided low back pain without sciatica           Plan:     I reassured him he has no worrisome findings on his MRI.  He is better clinically.  No further evaluation or treatment is necessary.  Follow-up here as " needed      Chronic right-sided low back pain without sciatica

## 2024-02-26 ENCOUNTER — TELEPHONE (OUTPATIENT)
Dept: ADMINISTRATIVE | Facility: CLINIC | Age: 84
End: 2024-02-26
Payer: MEDICARE

## 2024-02-27 ENCOUNTER — OFFICE VISIT (OUTPATIENT)
Dept: FAMILY MEDICINE | Facility: CLINIC | Age: 84
End: 2024-02-27
Payer: MEDICARE

## 2024-02-27 VITALS
BODY MASS INDEX: 25.18 KG/M2 | SYSTOLIC BLOOD PRESSURE: 125 MMHG | HEIGHT: 71 IN | OXYGEN SATURATION: 95 % | DIASTOLIC BLOOD PRESSURE: 70 MMHG | WEIGHT: 179.88 LBS | TEMPERATURE: 98 F | HEART RATE: 79 BPM

## 2024-02-27 DIAGNOSIS — Z00.00 ENCOUNTER FOR PREVENTIVE HEALTH EXAMINATION: Primary | ICD-10-CM

## 2024-02-27 DIAGNOSIS — I70.0 AORTIC ATHEROSCLEROSIS: ICD-10-CM

## 2024-02-27 PROCEDURE — 1160F RVW MEDS BY RX/DR IN RCRD: CPT | Mod: HCNC,CPTII,S$GLB, | Performed by: NURSE PRACTITIONER

## 2024-02-27 PROCEDURE — 3074F SYST BP LT 130 MM HG: CPT | Mod: HCNC,CPTII,S$GLB, | Performed by: NURSE PRACTITIONER

## 2024-02-27 PROCEDURE — 3078F DIAST BP <80 MM HG: CPT | Mod: HCNC,CPTII,S$GLB, | Performed by: NURSE PRACTITIONER

## 2024-02-27 PROCEDURE — 1170F FXNL STATUS ASSESSED: CPT | Mod: HCNC,CPTII,S$GLB, | Performed by: NURSE PRACTITIONER

## 2024-02-27 PROCEDURE — 1159F MED LIST DOCD IN RCRD: CPT | Mod: HCNC,CPTII,S$GLB, | Performed by: NURSE PRACTITIONER

## 2024-02-27 PROCEDURE — 1126F AMNT PAIN NOTED NONE PRSNT: CPT | Mod: HCNC,CPTII,S$GLB, | Performed by: NURSE PRACTITIONER

## 2024-02-27 PROCEDURE — 99999 PR PBB SHADOW E&M-EST. PATIENT-LVL V: CPT | Mod: PBBFAC,HCNC,, | Performed by: NURSE PRACTITIONER

## 2024-02-27 PROCEDURE — G0439 PPPS, SUBSEQ VISIT: HCPCS | Mod: HCNC,S$GLB,, | Performed by: NURSE PRACTITIONER

## 2024-02-27 PROCEDURE — 1101F PT FALLS ASSESS-DOCD LE1/YR: CPT | Mod: HCNC,CPTII,S$GLB, | Performed by: NURSE PRACTITIONER

## 2024-02-27 PROCEDURE — 3288F FALL RISK ASSESSMENT DOCD: CPT | Mod: HCNC,CPTII,S$GLB, | Performed by: NURSE PRACTITIONER

## 2024-02-27 RX ORDER — LANOLIN ALCOHOL/MO/W.PET/CERES
400 CREAM (GRAM) TOPICAL DAILY
COMMUNITY

## 2024-02-27 NOTE — PATIENT INSTRUCTIONS
Counseling and Referral of Other Preventative  (Italic type indicates deductible and co-insurance are waived)    Patient Name: Willie Liao  Today's Date: 2/27/2024    Health Maintenance       Date Due Completion Date    Shingles Vaccine (1 of 2) Never done ---    RSV Vaccine (Age 60+ and Pregnant patients) (1 - 1-dose 60+ series) Never done ---    TETANUS VACCINE 09/07/2021 9/7/2011    COVID-19 Vaccine (4 - 2023-24 season) 09/01/2023 10/10/2022    Lipid Panel 11/15/2024 11/15/2023    Aspirin/Antiplatelet Therapy 02/23/2025 2/23/2024        No orders of the defined types were placed in this encounter.      The following information is provided to all patients.  This information is to help you find resources for any of the problems found today that may be affecting your health:                  Living healthy guide: www.[facility].[place].[facility]      Understanding Diabetes: www.diabetes.org      Eating healthy: www.cdc.gov/healthyweight      [facility] home safety checklist: www.cdc.gov/steadi/patient.html      Agency on Aging: www.goea.[place].[facility]      Alcoholics anonymous (AA): www.aa.org      Physical Activity: www.jannette.nih.gov/ky7fwke      Tobacco use: www.quitwithusla.org

## 2024-02-27 NOTE — PROGRESS NOTES
"  Willie Liao presented for a  Medicare AWV and comprehensive Health Risk Assessment today. The following components were reviewed and updated:    Medical history  Family History  Social history  Allergies and Current Medications  Health Risk Assessment  Health Maintenance  Care Team         ** See Completed Assessments for Annual Wellness Visit within the encounter summary.**         The following assessments were completed:  Living Situation  CAGE  Depression Screening  Timed Get Up and Go  Whisper Test  Cognitive Function Screening  Nutrition Screening  ADL Screening   PAQ Screening    Clock in media     Vitals:    02/27/24 0955 02/27/24 1030   BP: (!) 142/70 125/70   Pulse: 79    Temp: 98.2 °F (36.8 °C)    TempSrc: Oral    SpO2: 95%    Weight: 81.6 kg (179 lb 14.3 oz)    Height: 5' 10.98" (1.803 m)      Body mass index is 25.1 kg/m².  Physical Exam  Constitutional:       Appearance: He is well-developed.   HENT:      Head: Normocephalic and atraumatic.      Right Ear: Hearing normal.      Left Ear: Hearing normal.      Nose: Nose normal.   Eyes:      General: Lids are normal.      Conjunctiva/sclera: Conjunctivae normal.      Pupils: Pupils are equal, round, and reactive to light.   Cardiovascular:      Rate and Rhythm: Normal rate.   Pulmonary:      Effort: Pulmonary effort is normal.   Abdominal:      Palpations: Abdomen is soft.   Musculoskeletal:         General: Normal range of motion.      Cervical back: Normal range of motion and neck supple.   Skin:     General: Skin is warm and dry.   Neurological:      Mental Status: He is alert and oriented to person, place, and time.               Diagnoses and health risks identified today and associated recommendations/orders:    1. Encounter for preventive health examination  Discussed health maintenance guidelines appropriate for age.        2. Aortic atherosclerosis  Stable continue to monitor  Followed pcp      Provided Willie with a 5-10 year written " screening schedule and personal prevention plan. Recommendations were developed using the USPSTF age appropriate recommendations. Education, counseling, and referrals were provided as needed. After Visit Summary printed and given to patient which includes a list of additional screenings\tests needed.    Follow up for One year for Annual Wellness Visit.    Benita Lawson, NP      I offered to discuss advanced care planning, including how to pick a person who would make decisions for you if you were unable to make them for yourself, called a health care power of , and what kind of decisions you might make such as use of life sustaining treatments such as ventilators and tube feeding when faced with a life limiting illness recorded on a living will that they will need to know. (How you want to be cared for as you near the end of your natural life)     X  Patient has advanced directive written but has opted not to place them on file with the institution.

## 2024-04-15 ENCOUNTER — OFFICE VISIT (OUTPATIENT)
Dept: DERMATOLOGY | Facility: CLINIC | Age: 84
End: 2024-04-15
Payer: MEDICARE

## 2024-04-15 VITALS — BODY MASS INDEX: 25.18 KG/M2 | HEIGHT: 71 IN | WEIGHT: 179.88 LBS

## 2024-04-15 DIAGNOSIS — Z85.828 ENCOUNTER FOR FOLLOW-UP SURVEILLANCE OF SKIN CANCER: Primary | ICD-10-CM

## 2024-04-15 DIAGNOSIS — Z08 ENCOUNTER FOR FOLLOW-UP SURVEILLANCE OF SKIN CANCER: Primary | ICD-10-CM

## 2024-04-15 PROCEDURE — 1126F AMNT PAIN NOTED NONE PRSNT: CPT | Mod: CPTII,S$GLB,, | Performed by: DERMATOLOGY

## 2024-04-15 PROCEDURE — 3288F FALL RISK ASSESSMENT DOCD: CPT | Mod: CPTII,S$GLB,, | Performed by: DERMATOLOGY

## 2024-04-15 PROCEDURE — 99213 OFFICE O/P EST LOW 20 MIN: CPT | Mod: S$GLB,,, | Performed by: DERMATOLOGY

## 2024-04-15 PROCEDURE — 1101F PT FALLS ASSESS-DOCD LE1/YR: CPT | Mod: CPTII,S$GLB,, | Performed by: DERMATOLOGY

## 2024-04-15 PROCEDURE — 1160F RVW MEDS BY RX/DR IN RCRD: CPT | Mod: CPTII,S$GLB,, | Performed by: DERMATOLOGY

## 2024-04-15 PROCEDURE — 1159F MED LIST DOCD IN RCRD: CPT | Mod: CPTII,S$GLB,, | Performed by: DERMATOLOGY

## 2024-04-15 NOTE — PROGRESS NOTES
Subjective:      Patient ID:  Willie Liao is a 83 y.o. male who presents for   Chief Complaint   Patient presents with    Spot     Right helix rim     LOV: 2/8/24 - NUB, AK, SK, nevi, angioma, lentigo    Skin, right helix rim, shave biopsy:    - Basal cell carcinoma, nodular type    - The deep and peripheral tissue edges are uninvolved by tumor   This lesion is skin cancer. You will be contacted regarding treatment.     Recheck right helix rim  Healed per patient      Derm Hx:  BCC - right helix rim, neg bx margins,  monitoring 2/2024  Ak's treated with cryo   8/16/2019 AK R helix   8/16/2019 AK R dorsal wrist   Has no fhx of MM    Current Outpatient Medications:   ·  amLODIPine (NORVASC) 2.5 MG tablet, TAKE 1 TABLET(2.5 MG) BY MOUTH EVERY DAY, Disp: 30 tablet, Rfl: 11  ·  atorvastatin (LIPITOR) 20 MG tablet, TAKE 1 TABLET(20 MG) BY MOUTH EVERY DAY, Disp: 90 tablet, Rfl: 3  ·  CYANOCOBALAMIN, VITAMIN B-12, (VITAMIN B-12 ORAL), Take 1 tablet by mouth once daily., Disp: , Rfl:   ·  ezetimibe (ZETIA) 10 mg tablet, TAKE 1 TABLET(10 MG) BY MOUTH EVERY DAY, Disp: 90 tablet, Rfl: 3  ·  glucosam/chond-msm1/C/angely/bor (GLUCOSAMINE-CHOND-MSM COMPLEX ORAL), Take 1 tablet by mouth once daily., Disp: , Rfl:   ·  magnesium oxide (MAG-OX) 400 mg (241.3 mg magnesium) tablet, Take 400 mg by mouth once daily., Disp: , Rfl:   ·  metoprolol succinate (TOPROL-XL) 25 MG 24 hr tablet, Take 1 tablet (25 mg total) by mouth once daily., Disp: 90 tablet, Rfl: 11  ·  MULTIVITAMIN WITH MINERALS/LUT (MULTIVITAMIN 50 PLUS ORAL), Take 1 tablet by mouth once daily. , Disp: , Rfl:   ·  omega-3 fatty acids/fish oil (FISH OIL-OMEGA-3 FATTY ACIDS) 300-1,000 mg capsule, Take by mouth once daily., Disp: , Rfl:   ·  UNABLE TO FIND, Beet root, Disp: , Rfl:   ·  VITAMIN C 500 MG tablet, , Disp: , Rfl:   ·  vitamin D3/vitamin K2, MK4, (K2 PLUS D3 ORAL), Take by mouth., Disp: , Rfl:   ·  zinc gluconate 50 mg tablet, Take 50 mg by mouth once daily.,  Disp: , Rfl:   ·  nitroGLYCERIN (NITROSTAT) 0.4 MG SL tablet, Place 1 tablet (0.4 mg total) under the tongue every 5 (five) minutes as needed for Chest pain., Disp: 25 tablet, Rfl: 6            Review of Systems   Constitutional:  Negative for fever, chills and fatigue.   Respiratory:  Negative for cough and shortness of breath.    Skin:  Positive for activity-related sunscreen use and wears hat. Negative for rash and daily sunscreen use.   Hematologic/Lymphatic: Does not bruise/bleed easily.       Objective:   Physical Exam   Constitutional: He appears well-developed and well-nourished. No distress.   Neurological: He is alert and oriented to person, place, and time. He is not disoriented.   Psychiatric: He has a normal mood and affect.   Skin:   Areas Examined (abnormalities noted in diagram):   Head / Face Inspection Performed                     Diagram Legend     Erythematous scaling macule/papule c/w actinic keratosis       Vascular papule c/w angioma      Pigmented verrucoid papule/plaque c/w seborrheic keratosis      Yellow umbilicated papule c/w sebaceous hyperplasia      Irregularly shaped tan macule c/w lentigo     1-2 mm smooth white papules consistent with Milia      Movable subcutaneous cyst with punctum c/w epidermal inclusion cyst      Subcutaneous movable cyst c/w pilar cyst      Firm pink to brown papule c/w dermatofibroma      Pedunculated fleshy papule(s) c/w skin tag(s)      Evenly pigmented macule c/w junctional nevus     Mildly variegated pigmented, slightly irregular-bordered macule c/w mildly atypical nevus      Flesh colored to evenly pigmented papule c/w intradermal nevus       Pink pearly papule/plaque c/w basal cell carcinoma      Erythematous hyperkeratotic cursted plaque c/w SCC      Surgical scar with no sign of skin cancer recurrence      Open and closed comedones      Inflammatory papules and pustules      Verrucoid papule consistent consistent with wart     Erythematous eczematous  patches and plaques     Dystrophic onycholytic nail with subungual debris c/w onychomycosis     Umbilicated papule    Erythematous-base heme-crusted tan verrucoid plaque consistent with inflamed seborrheic keratosis     Erythematous Silvery Scaling Plaque c/w Psoriasis     See annotation      Assessment / Plan:        Willie was seen today for spot.    Diagnoses and all orders for this visit:    Encounter for follow-up surveillance of skin cancer    Scar, no residual lesion or recurrence  Monitor for changes  Skin check in 6 months  Strict sun protection    Patient instructed in importance in daily broad spectrum sun protection of at least spf 30. Mineral sunscreen ingredients preferred (Zinc +/- Titanium) and can be found OTC.   Recommend Elta MD for daily use on face and neck.  Patient encouraged to wear hat for all outdoor exposure.   Also discussed sun avoidance and use of protective clothing.           No follow-ups on file.

## 2024-05-29 DIAGNOSIS — E78.2 HYPERLIPIDEMIA, MIXED: ICD-10-CM

## 2024-05-29 RX ORDER — ATORVASTATIN CALCIUM 20 MG/1
TABLET, FILM COATED ORAL
Qty: 90 TABLET | Refills: 1 | Status: SHIPPED | OUTPATIENT
Start: 2024-05-29

## 2024-05-29 NOTE — TELEPHONE ENCOUNTER
Refill Decision Note   Willie Liao  is requesting a refill authorization.  Brief Assessment and Rationale for Refill:  Approve     Medication Therapy Plan:         Comments:     Note composed:10:38 AM 05/29/2024

## 2024-05-29 NOTE — TELEPHONE ENCOUNTER
No care due was identified.  [facility] Embedded Care Due Messages. Reference number: [id number].   5/29/2024 9:06:11 AM CDT

## 2024-06-04 ENCOUNTER — OFFICE VISIT (OUTPATIENT)
Dept: ORTHOPEDICS | Facility: CLINIC | Age: 84
End: 2024-06-04
Payer: MEDICARE

## 2024-06-04 VITALS — HEIGHT: 71 IN | WEIGHT: 179.88 LBS | BODY MASS INDEX: 25.18 KG/M2

## 2024-06-04 DIAGNOSIS — M17.11 PRIMARY OSTEOARTHRITIS OF RIGHT KNEE: ICD-10-CM

## 2024-06-04 DIAGNOSIS — M17.12 PRIMARY OSTEOARTHRITIS OF LEFT KNEE: Primary | ICD-10-CM

## 2024-06-04 PROCEDURE — 1101F PT FALLS ASSESS-DOCD LE1/YR: CPT | Mod: CPTII,S$GLB,, | Performed by: ORTHOPAEDIC SURGERY

## 2024-06-04 PROCEDURE — 1159F MED LIST DOCD IN RCRD: CPT | Mod: CPTII,S$GLB,, | Performed by: ORTHOPAEDIC SURGERY

## 2024-06-04 PROCEDURE — 20610 DRAIN/INJ JOINT/BURSA W/O US: CPT | Mod: LT,S$GLB,, | Performed by: ORTHOPAEDIC SURGERY

## 2024-06-04 PROCEDURE — 99203 OFFICE O/P NEW LOW 30 MIN: CPT | Mod: 25,S$GLB,, | Performed by: ORTHOPAEDIC SURGERY

## 2024-06-04 PROCEDURE — 3288F FALL RISK ASSESSMENT DOCD: CPT | Mod: CPTII,S$GLB,, | Performed by: ORTHOPAEDIC SURGERY

## 2024-06-04 PROCEDURE — 1160F RVW MEDS BY RX/DR IN RCRD: CPT | Mod: CPTII,S$GLB,, | Performed by: ORTHOPAEDIC SURGERY

## 2024-06-04 PROCEDURE — 1125F AMNT PAIN NOTED PAIN PRSNT: CPT | Mod: CPTII,S$GLB,, | Performed by: ORTHOPAEDIC SURGERY

## 2024-06-04 RX ORDER — TRIAMCINOLONE ACETONIDE 40 MG/ML
40 INJECTION, SUSPENSION INTRA-ARTICULAR; INTRAMUSCULAR
Status: DISCONTINUED | OUTPATIENT
Start: 2024-06-04 | End: 2024-06-04 | Stop reason: HOSPADM

## 2024-06-04 RX ADMIN — TRIAMCINOLONE ACETONIDE 40 MG: 40 INJECTION, SUSPENSION INTRA-ARTICULAR; INTRAMUSCULAR at 08:06

## 2024-06-04 NOTE — PROGRESS NOTES
[facility]    Subjective:     Chief Complaint:   Chief Complaint   Patient presents with    Left Knee - Pain     Patient is here with complaints of bilateral knee pain x 1 year. Often has feeling his knees are going to give way, denies numbness and tingling     Right Knee - Pain       Past Medical History:   Diagnosis Date    Basal cell carcinoma     Carotid stenosis     Coronary artery disease     Gastritis     GERD (gastroesophageal reflux disease)     H. pylori infection     Hyperlipidemia     Shingles        Past Surgical History:   Procedure Laterality Date    arthroscope Left     knee arthroscopy, torn meniscus    COLONOSCOPY      Adrien    CORONARY ANGIOGRAPHY N/A 2/7/2022    Procedure: ANGIOGRAM, CORONARY ARTERY;  Surgeon: John Linn MD;  Location: [facility] CATH/EP LAB;  Service: Cardiology;  Laterality: N/A;    ECTROPION REPAIR Bilateral 3/13/2020    Procedure: REPAIR, ECTROPION, EYELID FLAPS - (Skin and/or Deep Tissue ;  Surgeon: Sarah Blackmon MD;  Location: [facility] OR [address] [place] [address];  Service: Ophthalmology;  Laterality: Bilateral;    LEFT HEART CATHETERIZATION Left 2/7/2022    Procedure: CATHETERIZATION, HEART, LEFT;  Surgeon: John Linn MD;  Location: [facility] CATH/EP LAB;  Service: Cardiology;  Laterality: Left;       Current Outpatient Medications   Medication Sig    amLODIPine (NORVASC) 2.5 MG tablet TAKE 1 TABLET(2.5 MG) BY MOUTH EVERY DAY    atorvastatin (LIPITOR) 20 MG tablet TAKE 1 TABLET(20 MG) BY MOUTH EVERY DAY    CYANOCOBALAMIN, VITAMIN B-12, (VITAMIN B-12 ORAL) Take 1 tablet by mouth once daily.    ezetimibe (ZETIA) 10 mg tablet TAKE 1 TABLET(10 MG) BY MOUTH EVERY DAY    glucosam/chond-msm1/C/angely/bor (GLUCOSAMINE-CHOND-MSM COMPLEX ORAL) Take 1 tablet by mouth once daily.    magnesium oxide (MAG-OX) 400 mg (241.3 mg magnesium) tablet Take 400 mg by mouth once daily.    metoprolol succinate (TOPROL-XL) 25 MG 24 hr tablet Take 1 tablet (25 mg total) by mouth once daily.     MULTIVITAMIN WITH MINERALS/LUT (MULTIVITAMIN 50 PLUS ORAL) Take 1 tablet by mouth once daily.     omega-3 fatty acids/fish oil (FISH OIL-OMEGA-3 FATTY ACIDS) 300-1,000 mg capsule Take by mouth once daily.    UNABLE TO FIND Beet root    VITAMIN C 500 MG tablet     vitamin D3/vitamin K2, MK4, (K2 PLUS D3 ORAL) Take by mouth.    zinc gluconate 50 mg tablet Take 50 mg by mouth once daily.    nitroGLYCERIN (NITROSTAT) 0.4 MG SL tablet Place 1 tablet (0.4 mg total) under the tongue every 5 (five) minutes as needed for Chest pain. (Patient not taking: Reported on 2024)     No current facility-administered medications for this visit.     Facility-Administered Medications Ordered in Other Visits   Medication    tetracaine HCL 0.5% ophthalmic solution 1 drop       Review of patient's allergies indicates:   Allergen Reactions    No known drug allergies        Family History   Problem Relation Name Age of Onset    Heart disease Father  65        MI    Hyperlipidemia Father      Stroke Father      COPD Mother      Glaucoma Mother      No Known Problems Sister      No Known Problems Brother      No Known Problems Daughter      Diabetes Unknown          uncles  and aunts     Cancer Paternal Uncle          prostate cancer    Melanoma Neg Hx         Social History     Socioeconomic History    Marital status:    Tobacco Use    Smoking status: Former     Current packs/day: 0.00     Types: Cigarettes     Quit date: 1997     Years since quittin.9    Smokeless tobacco: Never   Substance and Sexual Activity    Alcohol use: No    Drug use: No    Sexual activity: Yes     Social Determinants of Health     Financial Resource Strain: Low Risk  (2023)    Overall Financial Resource Strain (CARDIA)     Difficulty of Paying Living Expenses: Not hard at all   Food Insecurity: No Food Insecurity (2023)    Hunger Vital Sign     Worried About Running Out of Food in the Last Year: Never true     Ran Out of Food in the Last  Year: Never true   Transportation Needs: No Transportation Needs (5/30/2023)    PRAPARE - Transportation     Lack of Transportation (Medical): No     Lack of Transportation (Non-Medical): No   Physical Activity: Sufficiently Active (5/30/2023)    Exercise Vital Sign     Days of Exercise per Week: 3 days     Minutes of Exercise per Session: 60 min   Stress: No Stress Concern Present (5/30/2023)    [employer] - Occupational Stress Questionnaire     Feeling of Stress : Not at all   Housing Stability: Low Risk  (5/30/2023)    Housing Stability Vital Sign     Unable to Pay for Housing in the Last Year: No     Number of Places Lived in the Last Year: 1     Unstable Housing in the Last Year: No       History of present illness:  Patient comes in today for the left knee.  He has bilateral knee pain but the left is much worse than the right.  This been going on for many years.  Denies any new trauma.  I have injected him years ago.  The last 1 was almost 7 years ago.      Review of Systems:    Constitution: Negative for chills, fever, and sweats.  Negative for unexplained weight loss.    HENT:  Negative for headaches and blurry vision.    Cardiovascular:Negative for chest pain or irregular heart beat. Negative for hypertension.    Respiratory:  Negative for cough and shortness of breath.    Gastrointestinal: Negative for abdominal pain, heartburn, melena, nausea, and vomitting.    Genitourinary:  Negative bladder incontinence and dysuria.    Musculoskeletal:  See HPI for details.     Neurological: Negative for numbness.    Psychiatric/Behavioral: Negative for depression.  The patient is not nervous/anxious.      Endocrine: Negative for polyuria    Hematologic/Lymphatic: Negative for bleeding problem.  Does not bruise/bleed easily.    Skin: Negative for poor would healing and rash    Objective:      Physical Examination:    Vital Signs:  There were no vitals filed for this visit.    Body mass  index is 25.09 kg/m².    This a well-developed, well nourished patient in no acute distress.  They are alert and oriented and cooperative to examination.        Patient appears to be stated age.  He is got bilateral obvious varus deformities.  He is got 1+ effusion on the left none on the right he is got bilateral crepitus EHL is intact deep tendon reflexes are intact he can heel walk toe walk and squat.  Sensation is preserved.  Good dorsalis pedis bilaterally  Pertinent New Results:    XRAY Report / Interpretation:   Three views of the bilateral knees demonstrate end-stage bone-on-bone arthritis of the left knee with complete loss of the medial compartment and 3 compartment spurring    Assessment/Plan:      Severe osteoarthritis left knee I injected the left knee with steroid and local.  We spoke about joint replacement surgery for a long time.  We spoke about robotic surgery.  He will follow-up in 3 months      This note was created using Dragon voice recognition software that occasionally misinterpreted phrases or words.

## 2024-06-04 NOTE — PROCEDURES
Large Joint Aspiration/Injection: L knee    Date/Time: 6/4/2024 8:30 AM    Performed by: Garcia Plaza MD  Authorized by: Garcia Plaza MD    Consent Done?:  Yes (Verbal)  Indications:  Arthritis and pain  Site marked: the procedure site was marked    Timeout: prior to procedure the correct patient, procedure, and site was verified    Prep: patient was prepped and draped in usual sterile fashion      Local anesthesia used?: Yes    Local anesthetic:  Lidocaine 1% without epinephrine    Details:  Needle Size:  25 G  Ultrasonic Guidance for needle placement?: No    Location:  Knee  Site:  L knee  Medications:  40 mg triamcinolone acetonide 40 mg/mL  Patient tolerance:  Patient tolerated the procedure well with no immediate complications

## 2024-06-11 ENCOUNTER — HOSPITAL ENCOUNTER (OUTPATIENT)
Dept: RADIOLOGY | Facility: HOSPITAL | Age: 84
Discharge: HOME | End: 2024-06-11
Attending: INTERNAL MEDICINE
Payer: MEDICARE

## 2024-06-11 DIAGNOSIS — E78.00 HYPERCHOLESTEREMIA: ICD-10-CM

## 2024-06-11 DIAGNOSIS — I10 HYPERTENSION, UNSPECIFIED TYPE: ICD-10-CM

## 2024-06-11 DIAGNOSIS — I65.22 STENOSIS OF LEFT CAROTID ARTERY: ICD-10-CM

## 2024-06-11 PROCEDURE — 93880 EXTRACRANIAL BILAT STUDY: CPT | Mod: TC

## 2024-06-11 PROCEDURE — 93880 EXTRACRANIAL BILAT STUDY: CPT | Mod: 26,,, | Performed by: RADIOLOGY

## 2024-08-03 ENCOUNTER — NURSE TRIAGE (OUTPATIENT)
Dept: ADMINISTRATIVE | Facility: CLINIC | Age: 84
End: 2024-08-03
Payer: MEDICARE

## 2024-08-03 ENCOUNTER — HOSPITAL ENCOUNTER (EMERGENCY)
Facility: HOSPITAL | Age: 84
Discharge: HOME | End: 2024-08-03
Attending: EMERGENCY MEDICINE
Payer: MEDICARE

## 2024-08-03 VITALS
SYSTOLIC BLOOD PRESSURE: 152 MMHG | WEIGHT: 185 LBS | TEMPERATURE: 98 F | BODY MASS INDEX: 25.9 KG/M2 | OXYGEN SATURATION: 98 % | DIASTOLIC BLOOD PRESSURE: 73 MMHG | HEART RATE: 77 BPM | RESPIRATION RATE: 18 BRPM | HEIGHT: 71 IN

## 2024-08-03 DIAGNOSIS — R10.9 LEFT FLANK PAIN: ICD-10-CM

## 2024-08-03 DIAGNOSIS — N20.0 NEPHROLITHIASIS: Primary | ICD-10-CM

## 2024-08-03 LAB
ALBUMIN SERPL BCP-MCNC: 4.2 G/DL (ref 3.5–5.2)
ALP SERPL-CCNC: 110 U/L (ref 55–135)
ALT SERPL W/O P-5'-P-CCNC: 47 U/L (ref 10–44)
AMORPH CRY URNS QL MICRO: ABNORMAL
ANION GAP SERPL CALC-SCNC: 12 MMOL/L (ref 8–16)
AST SERPL-CCNC: 25 U/L (ref 10–40)
BACTERIA #/AREA URNS HPF: ABNORMAL /HPF
BASOPHILS # BLD AUTO: 0.02 K/UL (ref 0–0.2)
BASOPHILS NFR BLD: 0.2 % (ref 0–1.9)
BILIRUB SERPL-MCNC: 2.1 MG/DL (ref 0.1–1)
BILIRUB UR QL STRIP: NEGATIVE
BUN SERPL-MCNC: 13 MG/DL (ref 8–23)
CALCIUM SERPL-MCNC: 9.6 MG/DL (ref 8.7–10.5)
CHLORIDE SERPL-SCNC: 104 MMOL/L (ref 95–110)
CLARITY UR: CLEAR
CO2 SERPL-SCNC: 23 MMOL/L (ref 23–29)
COLOR UR: YELLOW
CREAT SERPL-MCNC: 1.2 MG/DL (ref 0.5–1.4)
DIFFERENTIAL METHOD BLD: ABNORMAL
EOSINOPHIL # BLD AUTO: 0.2 K/UL (ref 0–0.5)
EOSINOPHIL NFR BLD: 1.4 % (ref 0–8)
ERYTHROCYTE [DISTWIDTH] IN BLOOD BY AUTOMATED COUNT: 12.5 % (ref 11.5–14.5)
EST. GFR  (NO RACE VARIABLE): >60 ML/MIN/1.73 M^2
GLUCOSE SERPL-MCNC: 258 MG/DL (ref 70–110)
GLUCOSE UR QL STRIP: NEGATIVE
HCT VFR BLD AUTO: 46.1 % (ref 40–54)
HGB BLD-MCNC: 16 G/DL (ref 14–18)
HGB UR QL STRIP: ABNORMAL
IMM GRANULOCYTES # BLD AUTO: 0.05 K/UL (ref 0–0.04)
IMM GRANULOCYTES NFR BLD AUTO: 0.4 % (ref 0–0.5)
KETONES UR QL STRIP: NEGATIVE
LEUKOCYTE ESTERASE UR QL STRIP: NEGATIVE
LYMPHOCYTES # BLD AUTO: 2.8 K/UL (ref 1–4.8)
LYMPHOCYTES NFR BLD: 23.1 % (ref 18–48)
MCH RBC QN AUTO: 31.6 PG (ref 27–31)
MCHC RBC AUTO-ENTMCNC: 34.7 G/DL (ref 32–36)
MCV RBC AUTO: 91 FL (ref 82–98)
MICROSCOPIC COMMENT: ABNORMAL
MONOCYTES # BLD AUTO: 0.6 K/UL (ref 0.3–1)
MONOCYTES NFR BLD: 4.9 % (ref 4–15)
NEUTROPHILS # BLD AUTO: 8.6 K/UL (ref 1.8–7.7)
NEUTROPHILS NFR BLD: 70 % (ref 38–73)
NITRITE UR QL STRIP: NEGATIVE
NRBC BLD-RTO: 0 /100 WBC
PH UR STRIP: 5 [PH] (ref 5–8)
PLATELET # BLD AUTO: 214 K/UL (ref 150–450)
PMV BLD AUTO: 11 FL (ref 9.2–12.9)
POTASSIUM SERPL-SCNC: 4 MMOL/L (ref 3.5–5.1)
PROT SERPL-MCNC: 7.2 G/DL (ref 6–8.4)
PROT UR QL STRIP: ABNORMAL
RBC # BLD AUTO: 5.07 M/UL (ref 4.6–6.2)
RBC #/AREA URNS HPF: 10 /HPF (ref 0–4)
SODIUM SERPL-SCNC: 139 MMOL/L (ref 136–145)
SP GR UR STRIP: 1.03 (ref 1–1.03)
SQUAMOUS #/AREA URNS HPF: 0 /HPF
URN SPEC COLLECT METH UR: ABNORMAL
UROBILINOGEN UR STRIP-ACNC: NEGATIVE EU/DL
WBC # BLD AUTO: 12.22 K/UL (ref 3.9–12.7)
WBC #/AREA URNS HPF: 1 /HPF (ref 0–5)

## 2024-08-03 PROCEDURE — 96374 THER/PROPH/DIAG INJ IV PUSH: CPT

## 2024-08-03 PROCEDURE — 99285 EMERGENCY DEPT VISIT HI MDM: CPT | Mod: 25

## 2024-08-03 PROCEDURE — 63600175 PHARM REV CODE 636 W HCPCS: Performed by: EMERGENCY MEDICINE

## 2024-08-03 PROCEDURE — 81000 URINALYSIS NONAUTO W/SCOPE: CPT | Performed by: EMERGENCY MEDICINE

## 2024-08-03 PROCEDURE — 96375 TX/PRO/DX INJ NEW DRUG ADDON: CPT

## 2024-08-03 PROCEDURE — 36415 COLL VENOUS BLD VENIPUNCTURE: CPT | Performed by: EMERGENCY MEDICINE

## 2024-08-03 PROCEDURE — 80053 COMPREHEN METABOLIC PANEL: CPT | Performed by: EMERGENCY MEDICINE

## 2024-08-03 PROCEDURE — 85025 COMPLETE CBC W/AUTO DIFF WBC: CPT | Performed by: EMERGENCY MEDICINE

## 2024-08-03 RX ORDER — METOCLOPRAMIDE HYDROCHLORIDE 5 MG/ML
10 INJECTION INTRAMUSCULAR; INTRAVENOUS
Status: COMPLETED | OUTPATIENT
Start: 2024-08-03 | End: 2024-08-03

## 2024-08-03 RX ORDER — KETOROLAC TROMETHAMINE 30 MG/ML
15 INJECTION, SOLUTION INTRAMUSCULAR; INTRAVENOUS
Status: COMPLETED | OUTPATIENT
Start: 2024-08-03 | End: 2024-08-03

## 2024-08-03 RX ADMIN — KETOROLAC TROMETHAMINE 15 MG: 30 INJECTION, SOLUTION INTRAMUSCULAR at 07:08

## 2024-08-03 RX ADMIN — METOCLOPRAMIDE 10 MG: 5 INJECTION, SOLUTION INTRAMUSCULAR; INTRAVENOUS at 07:08

## 2024-08-03 NOTE — TELEPHONE ENCOUNTER
Pt c/o pain to left lower side, back. 8/10 pain, constant, since 4am. Per protocol, go to ED now. Advised to have another adult drive and to call back for any further questions or concerns.   Reason for Disposition   [1] SEVERE pain (e.g., excruciating, scale 8-10) AND [2] present > 1 hour    Additional Information   Negative: Passed out (e.g., fainted, lost consciousness, blacked out and was not responding)   Negative: Shock suspected (e.g., cold/pale/clammy skin, too weak to stand, low BP, rapid pulse)   Negative: Difficult to awaken or acting confused (e.g., disoriented, slurred speech)   Negative: Sounds like a life-threatening emergency to the triager    Protocols used: Flank Pain-A-AH

## 2024-09-09 ENCOUNTER — TELEPHONE (OUTPATIENT)
Dept: FAMILY MEDICINE | Facility: CLINIC | Age: 84
End: 2024-09-09
Payer: MEDICARE

## 2024-09-09 NOTE — TELEPHONE ENCOUNTER
----- Message from Luci Ralph sent at 9/9/2024  2:04 PM CDT -----  Regarding: referral  Contact: Genie spouse  Type:  Patient Requesting Referral    Who Called:  Genie spouse  Does the patient already have the specialty appointment scheduled?:    If yes, what is the date of that appointment?:    Referral to What Specialty:  [facility]  Reason for Referral:    Does the patient want the referral with a specific physician?:  Dr. Moran  [facility]?:  non [facility]  Patient Requesting a Call Back?:  call  Best Call Back Number:  [phone]  Additional Information:   Please call patient to advise.  Thanks!

## 2024-09-09 NOTE — TELEPHONE ENCOUNTER
Left detailed voicemail message to schedule pt for hospital follow up visit requested pt return phone call.

## 2024-09-09 NOTE — TELEPHONE ENCOUNTER
----- Message from Tania Salazar sent at 9/9/2024 11:03 AM CDT -----  Regarding: Referral  Type:  Needs Medical Advice    Who Called: Pt Wife    Would the patient rather a call back or a response via [facility]? Call back    Best Call Back Number: [phone]    Additional Information: Wife is requesting a  referral for Neurology. Thank you

## 2024-09-16 ENCOUNTER — OFFICE VISIT (OUTPATIENT)
Dept: FAMILY MEDICINE | Facility: CLINIC | Age: 84
End: 2024-09-16
Payer: MEDICARE

## 2024-09-16 VITALS
SYSTOLIC BLOOD PRESSURE: 120 MMHG | BODY MASS INDEX: 24.91 KG/M2 | HEART RATE: 78 BPM | OXYGEN SATURATION: 96 % | DIASTOLIC BLOOD PRESSURE: 80 MMHG | WEIGHT: 177.94 LBS | TEMPERATURE: 98 F | HEIGHT: 71 IN

## 2024-09-16 DIAGNOSIS — Z87.442 HISTORY OF NEPHROLITHIASIS: Primary | ICD-10-CM

## 2024-09-16 DIAGNOSIS — R35.0 URINARY FREQUENCY: ICD-10-CM

## 2024-09-16 DIAGNOSIS — R35.1 NOCTURIA: ICD-10-CM

## 2024-09-16 PROCEDURE — 1160F RVW MEDS BY RX/DR IN RCRD: CPT | Mod: HCNC,CPTII,S$GLB,

## 2024-09-16 PROCEDURE — 3079F DIAST BP 80-89 MM HG: CPT | Mod: HCNC,CPTII,S$GLB,

## 2024-09-16 PROCEDURE — 1126F AMNT PAIN NOTED NONE PRSNT: CPT | Mod: HCNC,CPTII,S$GLB,

## 2024-09-16 PROCEDURE — 1159F MED LIST DOCD IN RCRD: CPT | Mod: HCNC,CPTII,S$GLB,

## 2024-09-16 PROCEDURE — 3074F SYST BP LT 130 MM HG: CPT | Mod: HCNC,CPTII,S$GLB,

## 2024-09-16 PROCEDURE — 99999 PR PBB SHADOW E&M-EST. PATIENT-LVL V: CPT | Mod: PBBFAC,HCNC,,

## 2024-09-16 PROCEDURE — 99213 OFFICE O/P EST LOW 20 MIN: CPT | Mod: HCNC,S$GLB,,

## 2024-09-16 NOTE — PATIENT INSTRUCTIONS
David Tapia,     If you are due for any health screening(s) below please notify me so we can arrange them to be ordered and scheduled. Most healthy patients at your age complete them, but you are free to accept or refuse.     If you can't do it, I'll definitely understand. If you can, I'd certainly appreciate it!    All of your core healthy metrics are met.

## 2024-09-16 NOTE — PROGRESS NOTES
Subjective:       Patient ID: Willie Liao is a 84 y.o. male.    Chief Complaint: ED follow up       iWllie Liao is a 84 y.o. male with HTN, CAD, HLD who presents to clinic for ED follow up. Pt presented to the ED on 8/3 for evaluation of left sided flank pain. CT revealed 3 mm stone in urinary bladder suggestive of being recently passed. The patient denies any further flank pain. He does endorse urinary frequency, nocturia (getting up about 3x nightly), and difficulty initiating urinary stream. He requests to see urology, Dr. Watkins, to discuss. He reports no change in symptoms since his ED visit, noting this has been ongoing for several months.           Review of Systems   Constitutional:  Negative for chills and fever.   Genitourinary:  Positive for frequency. Negative for difficulty urinating, dysuria, flank pain and hematuria.         Past Medical History:   Diagnosis Date    Basal cell carcinoma     Carotid stenosis     Coronary artery disease     Gastritis     GERD (gastroesophageal reflux disease)     H. pylori infection     Hyperlipidemia     Shingles        Review of patient's allergies indicates:   Allergen Reactions    No known drug allergies          Current Outpatient Medications:     amLODIPine (NORVASC) 2.5 MG tablet, TAKE 1 TABLET(2.5 MG) BY MOUTH EVERY DAY, Disp: 30 tablet, Rfl: 11    atorvastatin (LIPITOR) 20 MG tablet, TAKE 1 TABLET(20 MG) BY MOUTH EVERY DAY, Disp: 90 tablet, Rfl: 1    CYANOCOBALAMIN, VITAMIN B-12, (VITAMIN B-12 ORAL), Take 1 tablet by mouth once daily., Disp: , Rfl:     ezetimibe (ZETIA) 10 mg tablet, TAKE 1 TABLET(10 MG) BY MOUTH EVERY DAY, Disp: 90 tablet, Rfl: 3    glucosam/chond-msm1/C/angely/bor (GLUCOSAMINE-CHOND-MSM COMPLEX ORAL), Take 1 tablet by mouth once daily., Disp: , Rfl:     magnesium oxide (MAG-OX) 400 mg (241.3 mg magnesium) tablet, Take 400 mg by mouth once daily., Disp: , Rfl:     metoprolol succinate (TOPROL-XL) 25 MG 24 hr tablet, Take 1  "tablet (25 mg total) by mouth once daily., Disp: 90 tablet, Rfl: 11    MULTIVITAMIN WITH MINERALS/LUT (MULTIVITAMIN 50 PLUS ORAL), Take 1 tablet by mouth once daily. , Disp: , Rfl:     omega-3 fatty acids/fish oil (FISH OIL-OMEGA-3 FATTY ACIDS) 300-1,000 mg capsule, Take by mouth once daily., Disp: , Rfl:     UNABLE TO FIND, Beet root, Disp: , Rfl:     VITAMIN C 500 MG tablet, , Disp: , Rfl:     vitamin D3/vitamin K2, MK4, (K2 PLUS D3 ORAL), Take by mouth., Disp: , Rfl:     zinc gluconate 50 mg tablet, Take 50 mg by mouth once daily., Disp: , Rfl:     nitroGLYCERIN (NITROSTAT) 0.4 MG SL tablet, Place 1 tablet (0.4 mg total) under the tongue every 5 (five) minutes as needed for Chest pain. (Patient not taking: Reported on 6/4/2024), Disp: 25 tablet, Rfl: 6  No current facility-administered medications for this visit.    Facility-Administered Medications Ordered in Other Visits:     tetracaine HCL 0.5% ophthalmic solution 1 drop, 1 drop, Both Eyes, Once, Peri Galeana MD    Objective:        Physical Exam  Vitals reviewed.   Constitutional:       General: He is not in acute distress.     Appearance: Normal appearance.   HENT:      Head: Normocephalic and atraumatic.   Eyes:      Conjunctiva/sclera: Conjunctivae normal.   Cardiovascular:      Rate and Rhythm: Normal rate and regular rhythm.      Heart sounds: Normal heart sounds.   Pulmonary:      Effort: Pulmonary effort is normal.      Breath sounds: Normal breath sounds.   Musculoskeletal:         General: Normal range of motion.      Cervical back: Normal range of motion.   Skin:     General: Skin is warm and dry.   Neurological:      Mental Status: He is alert and oriented to person, place, and time.   Psychiatric:         Behavior: Behavior normal.           Visit Vitals  /80 (BP Location: Right arm, Patient Position: Sitting, BP Method: Small (Manual))   Pulse 78   Temp 98 °F (36.7 °C) (Oral)   Ht 5' 11" (1.803 m)   Wt 80.7 kg (177 lb 14.6 oz)   SpO2 96% "   BMI 24.81 kg/m²      Assessment:         1. History of nephrolithiasis    2. Urinary frequency    3. Nocturia        Plan:         Diagnoses and all orders for this visit:    History of nephrolithiasis  -     Ambulatory referral/consult to Urology; Future  -     Referral faxed to Dr. Levy's office as requested.     Urinary frequency  -     Ambulatory referral/consult to Urology; Future    Nocturia  -     Ambulatory referral/consult to Urology; Future  -     Likely secondary to BPH. Referral placed to urology as requested.      Follow up as scheduled with PCP or sooner if needed.        Family Medicine Physician Assistant     Future Appointments       Date Provider Specialty Appt Notes    10/22/2024 Nadia Azevedo MD Dermatology 6 mth skin check    11/19/2024 Cooper Luz MD Family Medicine annual             All of your core healthy metrics are met.       I spent a total of 25 minutes on the day of the visit.This includes face to face time and non-face to face time preparing to see the patient (eg, review of tests), obtaining and/or reviewing separately obtained history, documenting clinical information in the electronic or other health record, independently interpreting results and communicating results to the patient/family/caregiver, or care coordinator.

## 2024-09-23 NOTE — TELEPHONE ENCOUNTER
----- Message from Mario Day sent at 3/14/2017 10:34 AM CDT -----  Contact: Wife - Genie Liao  States that the patient is ready for the pneumonia shot.  And to please back [phone].  Thank you.     176.1

## 2024-10-08 RX ORDER — EZETIMIBE 10 MG/1
10 TABLET ORAL
Qty: 90 TABLET | Refills: 3 | Status: SHIPPED | OUTPATIENT
Start: 2024-10-08

## 2024-10-22 ENCOUNTER — OFFICE VISIT (OUTPATIENT)
Dept: DERMATOLOGY | Facility: CLINIC | Age: 84
End: 2024-10-22
Payer: MEDICARE

## 2024-10-22 VITALS — BODY MASS INDEX: 24.92 KG/M2 | WEIGHT: 178 LBS | HEIGHT: 71 IN

## 2024-10-22 DIAGNOSIS — L82.1 SEBORRHEIC KERATOSES: ICD-10-CM

## 2024-10-22 DIAGNOSIS — L57.8 OTHER SKIN CHANGES DUE TO CHRONIC EXPOSURE TO NONIONIZING RADIATION: ICD-10-CM

## 2024-10-22 DIAGNOSIS — Z08 ENCOUNTER FOR FOLLOW-UP SURVEILLANCE OF SKIN CANCER: ICD-10-CM

## 2024-10-22 DIAGNOSIS — Z85.828 ENCOUNTER FOR FOLLOW-UP SURVEILLANCE OF SKIN CANCER: ICD-10-CM

## 2024-10-22 DIAGNOSIS — L57.0 ACTINIC KERATOSES: Primary | ICD-10-CM

## 2024-10-22 DIAGNOSIS — L81.4 SOLAR LENTIGO: ICD-10-CM

## 2024-10-22 DIAGNOSIS — D18.01 CHERRY ANGIOMA: ICD-10-CM

## 2024-10-22 DIAGNOSIS — D22.9 MULTIPLE BENIGN NEVI: ICD-10-CM

## 2024-10-22 PROCEDURE — 1101F PT FALLS ASSESS-DOCD LE1/YR: CPT | Mod: CPTII,S$GLB,, | Performed by: DERMATOLOGY

## 2024-10-22 PROCEDURE — 3288F FALL RISK ASSESSMENT DOCD: CPT | Mod: CPTII,S$GLB,, | Performed by: DERMATOLOGY

## 2024-10-22 PROCEDURE — 17000 DESTRUCT PREMALG LESION: CPT | Mod: S$GLB,,, | Performed by: DERMATOLOGY

## 2024-10-22 PROCEDURE — 1160F RVW MEDS BY RX/DR IN RCRD: CPT | Mod: CPTII,S$GLB,, | Performed by: DERMATOLOGY

## 2024-10-22 PROCEDURE — 99213 OFFICE O/P EST LOW 20 MIN: CPT | Mod: 25,S$GLB,, | Performed by: DERMATOLOGY

## 2024-10-22 PROCEDURE — 1159F MED LIST DOCD IN RCRD: CPT | Mod: CPTII,S$GLB,, | Performed by: DERMATOLOGY

## 2024-10-22 PROCEDURE — 17003 DESTRUCT PREMALG LES 2-14: CPT | Mod: S$GLB,,, | Performed by: DERMATOLOGY

## 2024-10-22 NOTE — PROGRESS NOTES
Subjective:      Patient ID:  Willie Liao is a 84 y.o. male who presents for   Chief Complaint   Patient presents with    Skin Check     TBSC      LOV 4/15/24 Encounter for surveillance of skin cancer    Patient here today for TBSC  C/O spots to right arm.     Recheck right helix rim  Healed per patient     Derm Hx:  BCC - right helix rim, neg bx margins,  monitoring 2/2024  Ak's treated with cryo   8/16/2019 AK R helix   8/16/2019 AK R dorsal wrist   Has no fhx of MM    Current Outpatient Medications:   ·  amLODIPine (NORVASC) 2.5 MG tablet, TAKE 1 TABLET(2.5 MG) BY MOUTH EVERY DAY, Disp: 30 tablet, Rfl: 11  ·  atorvastatin (LIPITOR) 20 MG tablet, TAKE 1 TABLET(20 MG) BY MOUTH EVERY DAY, Disp: 90 tablet, Rfl: 1  ·  CYANOCOBALAMIN, VITAMIN B-12, (VITAMIN B-12 ORAL), Take 1 tablet by mouth once daily., Disp: , Rfl:   ·  ezetimibe (ZETIA) 10 mg tablet, TAKE 1 TABLET(10 MG) BY MOUTH EVERY DAY, Disp: 90 tablet, Rfl: 3  ·  glucosam/chond-msm1/C/angely/bor (GLUCOSAMINE-CHOND-MSM COMPLEX ORAL), Take 1 tablet by mouth once daily., Disp: , Rfl:   ·  magnesium oxide (MAG-OX) 400 mg (241.3 mg magnesium) tablet, Take 400 mg by mouth once daily., Disp: , Rfl:   ·  metoprolol succinate (TOPROL-XL) 25 MG 24 hr tablet, Take 1 tablet (25 mg total) by mouth once daily., Disp: 90 tablet, Rfl: 11  ·  MULTIVITAMIN WITH MINERALS/LUT (MULTIVITAMIN 50 PLUS ORAL), Take 1 tablet by mouth once daily. , Disp: , Rfl:   ·  omega-3 fatty acids/fish oil (FISH OIL-OMEGA-3 FATTY ACIDS) 300-1,000 mg capsule, Take by mouth once daily., Disp: , Rfl:   ·  UNABLE TO FIND, Beet root, Disp: , Rfl:   ·  VITAMIN C 500 MG tablet, , Disp: , Rfl:   ·  vitamin D3/vitamin K2, MK4, (K2 PLUS D3 ORAL), Take by mouth., Disp: , Rfl:   ·  zinc gluconate 50 mg tablet, Take 50 mg by mouth once daily., Disp: , Rfl:   ·  nitroGLYCERIN (NITROSTAT) 0.4 MG SL tablet, Place 1 tablet (0.4 mg total) under the tongue every 5 (five) minutes as needed for Chest pain. (Patient  not taking: Reported on 6/4/2024), Disp: 25 tablet, Rfl: 6            Review of Systems   Constitutional:  Negative for fever, chills and fatigue.   Respiratory:  Negative for cough and shortness of breath.    Skin:  Positive for activity-related sunscreen use and wears hat. Negative for rash and daily sunscreen use.   Hematologic/Lymphatic: Does not bruise/bleed easily.       Objective:   Physical Exam   Constitutional: He appears well-developed and well-nourished. No distress.   Neurological: He is alert and oriented to person, place, and time. He is not disoriented.   Psychiatric: He has a normal mood and affect.   Skin:   Areas Examined (abnormalities noted in diagram):   Scalp / Hair Palpated and Inspected  Head / Face Inspection Performed  Neck Inspection Performed  Chest / Axilla Inspection Performed  Abdomen Inspection Performed  Genitals / Buttocks / Groin Inspection Performed  Back Inspection Performed  RUE Inspected  LUE Inspection Performed  RLE Inspected  LLE Inspection Performed  Nails and Digits Inspection Performed                     Diagram Legend     Erythematous scaling macule/papule c/w actinic keratosis       Vascular papule c/w angioma      Pigmented verrucoid papule/plaque c/w seborrheic keratosis      Yellow umbilicated papule c/w sebaceous hyperplasia      Irregularly shaped tan macule c/w lentigo     1-2 mm smooth white papules consistent with Milia      Movable subcutaneous cyst with punctum c/w epidermal inclusion cyst      Subcutaneous movable cyst c/w pilar cyst      Firm pink to brown papule c/w dermatofibroma      Pedunculated fleshy papule(s) c/w skin tag(s)      Evenly pigmented macule c/w junctional nevus     Mildly variegated pigmented, slightly irregular-bordered macule c/w mildly atypical nevus      Flesh colored to evenly pigmented papule c/w intradermal nevus       Pink pearly papule/plaque c/w basal cell carcinoma      Erythematous hyperkeratotic cursted plaque c/w SCC       Surgical scar with no sign of skin cancer recurrence      Open and closed comedones      Inflammatory papules and pustules      Verrucoid papule consistent consistent with wart     Erythematous eczematous patches and plaques     Dystrophic onycholytic nail with subungual debris c/w onychomycosis     Umbilicated papule    Erythematous-base heme-crusted tan verrucoid plaque consistent with inflamed seborrheic keratosis     Erythematous Silvery Scaling Plaque c/w Psoriasis     See annotation      Assessment / Plan:        Actinic keratoses  Cryosurgery Procedure Note    Verbal consent from the patient is obtained and the patient is aware of the precancerous quality and need for treatment of these lesions. Liquid nitrogen cryosurgery is applied to the 9 actinic keratoses, as detailed in the physical exam, to produce a freeze injury. The patient is aware that blisters may form and is instructed on wound care with gentle cleansing and use of vaseline ointment to keep moist until healed. The patient is supplied a handout on cryosurgery and is instructed to call if lesions do not completely resolve.    Encounter for follow-up surveillance of skin cancer  Area of previous NMSC examined. Site well healed with no signs of recurrence.    Total body skin examination performed today including at least 12 points as noted in physical examination. No lesions suspicious for malignancy noted.    Seborrheic keratoses  These are benign inherited growths without a malignant potential. Reassurance given to patient. No treatment is necessary.     Multiple benign nevi  Careful dermoscopy evaluation of nevi performed with none identified as needing biopsy today  Monitor for new mole or moles that are becoming bigger, darker, irritated, or developing irregular borders.     Cherry angioma  This is a benign vascular lesion. Reassurance given. No treatment required.     Solar lentigo  This is a benign hyperpigmented sun induced lesion. Daily sun  protection will reduce the number of new lesions. Treatment of these benign lesions are considered cosmetic.    Other skin changes due to chronic exposure to nonionizing radiation  Patient instructed in importance in daily broad spectrum sun protection of at least spf 30. Mineral sunscreen ingredients preferred (Zinc +/- Titanium) and can be found OTC.   Patient encouraged to wear hat for all outdoor exposure.   Also discussed sun avoidance and use of protective clothing.             No follow-ups on file.

## 2024-10-30 ENCOUNTER — TELEPHONE (OUTPATIENT)
Dept: FAMILY MEDICINE | Facility: CLINIC | Age: 84
End: 2024-10-30
Payer: MEDICARE

## 2024-10-30 NOTE — PROGRESS NOTES
Pt Swabbed   Bloody drainage noted on back left shoulder dsg, reinforced with ABD.  Discharge instructions reviewed with pt, spouse.  Verbalizes understanding.

## 2024-11-14 ENCOUNTER — LAB VISIT (OUTPATIENT)
Dept: LAB | Facility: HOSPITAL | Age: 84
End: 2024-11-14
Attending: SPECIALIST
Payer: MEDICARE

## 2024-11-14 DIAGNOSIS — Z12.5 SPECIAL SCREENING FOR MALIGNANT NEOPLASM OF PROSTATE: Primary | ICD-10-CM

## 2024-11-14 LAB — COMPLEXED PSA SERPL-MCNC: 1.8 NG/ML (ref 0–4)

## 2024-11-14 PROCEDURE — 36415 COLL VENOUS BLD VENIPUNCTURE: CPT | Mod: HCNC,PO | Performed by: SPECIALIST

## 2024-11-14 PROCEDURE — 84153 ASSAY OF PSA TOTAL: CPT | Mod: HCNC | Performed by: SPECIALIST

## 2024-11-19 ENCOUNTER — OFFICE VISIT (OUTPATIENT)
Dept: FAMILY MEDICINE | Facility: CLINIC | Age: 84
End: 2024-11-19
Payer: MEDICARE

## 2024-11-19 VITALS
OXYGEN SATURATION: 95 % | DIASTOLIC BLOOD PRESSURE: 74 MMHG | RESPIRATION RATE: 16 BRPM | HEIGHT: 71 IN | WEIGHT: 179.88 LBS | BODY MASS INDEX: 25.18 KG/M2 | TEMPERATURE: 98 F | SYSTOLIC BLOOD PRESSURE: 118 MMHG | HEART RATE: 77 BPM

## 2024-11-19 DIAGNOSIS — E78.2 HYPERLIPIDEMIA, MIXED: Primary | ICD-10-CM

## 2024-11-19 DIAGNOSIS — R79.9 ABNORMAL FINDING OF BLOOD CHEMISTRY, UNSPECIFIED: ICD-10-CM

## 2024-11-19 DIAGNOSIS — I65.29 STENOSIS OF CAROTID ARTERY, UNSPECIFIED LATERALITY: ICD-10-CM

## 2024-11-19 DIAGNOSIS — K21.9 GASTROESOPHAGEAL REFLUX DISEASE WITHOUT ESOPHAGITIS: ICD-10-CM

## 2024-11-19 PROCEDURE — 3288F FALL RISK ASSESSMENT DOCD: CPT | Mod: HCNC,CPTII,S$GLB, | Performed by: FAMILY MEDICINE

## 2024-11-19 PROCEDURE — 3074F SYST BP LT 130 MM HG: CPT | Mod: HCNC,CPTII,S$GLB, | Performed by: FAMILY MEDICINE

## 2024-11-19 PROCEDURE — G2211 COMPLEX E/M VISIT ADD ON: HCPCS | Mod: HCNC,S$GLB,, | Performed by: FAMILY MEDICINE

## 2024-11-19 PROCEDURE — 1101F PT FALLS ASSESS-DOCD LE1/YR: CPT | Mod: HCNC,CPTII,S$GLB, | Performed by: FAMILY MEDICINE

## 2024-11-19 PROCEDURE — 1159F MED LIST DOCD IN RCRD: CPT | Mod: HCNC,CPTII,S$GLB, | Performed by: FAMILY MEDICINE

## 2024-11-19 PROCEDURE — 99999 PR PBB SHADOW E&M-EST. PATIENT-LVL IV: CPT | Mod: PBBFAC,HCNC,, | Performed by: FAMILY MEDICINE

## 2024-11-19 PROCEDURE — 3078F DIAST BP <80 MM HG: CPT | Mod: HCNC,CPTII,S$GLB, | Performed by: FAMILY MEDICINE

## 2024-11-19 PROCEDURE — 1126F AMNT PAIN NOTED NONE PRSNT: CPT | Mod: HCNC,CPTII,S$GLB, | Performed by: FAMILY MEDICINE

## 2024-11-19 PROCEDURE — 99214 OFFICE O/P EST MOD 30 MIN: CPT | Mod: HCNC,S$GLB,, | Performed by: FAMILY MEDICINE

## 2024-11-19 RX ORDER — FLUOROMETHOLONE 1 MG/ML
SUSPENSION/ DROPS OPHTHALMIC
COMMUNITY
Start: 2024-10-14

## 2024-11-19 NOTE — PROGRESS NOTES
Subjective:   Patient ID: Willie Liao is a 84 y.o. male     Chief Complaint:Annual Exam      Here for checkup      Review of Systems   Constitutional:  Negative for chills and fever.   HENT:  Negative for sore throat and trouble swallowing.    Respiratory:  Negative for cough and shortness of breath.    Cardiovascular:  Negative for chest pain and leg swelling.   Gastrointestinal:  Negative for abdominal distention and abdominal pain.   Genitourinary:  Negative for dysuria and flank pain.   Musculoskeletal:  Negative for arthralgias and back pain.   Skin:  Negative for color change and pallor.   Neurological:  Negative for weakness and headaches.   Psychiatric/Behavioral:  Negative for agitation and confusion.      Past Medical History:   Diagnosis Date    Basal cell carcinoma     Carotid stenosis     Coronary artery disease     Gastritis     GERD (gastroesophageal reflux disease)     H. pylori infection     Hyperlipidemia     Shingles      Past Surgical History:   Procedure Laterality Date    arthroscope Left     knee arthroscopy, torn meniscus    COLONOSCOPY      [facility]    CORONARY ANGIOGRAPHY N/A 2/7/2022    Procedure: ANGIOGRAM, CORONARY ARTERY;  Surgeon: John Linn MD;  Location: [facility] CATH/EP LAB;  Service: Cardiology;  Laterality: N/A;    ECTROPION REPAIR Bilateral 3/13/2020    Procedure: REPAIR, ECTROPION, EYELID FLAPS - (Skin and/or Deep Tissue ;  Surgeon: Sarah Blackmon MD;  Location: [facility] OR [address] [place] [address];  Service: Ophthalmology;  Laterality: Bilateral;    LEFT HEART CATHETERIZATION Left 2/7/2022    Procedure: CATHETERIZATION, HEART, LEFT;  Surgeon: John Linn MD;  Location: [facility] CATH/EP LAB;  Service: Cardiology;  Laterality: Left;     Objective:     Vitals:    11/19/24 1429   BP: 118/74   Pulse: 77   Resp: 16   Temp: 97.9 °F (36.6 °C)     Body mass index is 25.09 kg/m².  Physical Exam  Vitals and nursing note reviewed.   Constitutional:       Appearance: He is well-developed.    HENT:      Head: Normocephalic and atraumatic.   Eyes:      General: No scleral icterus.     Conjunctiva/sclera: Conjunctivae normal.   Cardiovascular:      Heart sounds: No murmur heard.  Pulmonary:      Effort: Pulmonary effort is normal. No respiratory distress.   Musculoskeletal:         General: No deformity. Normal range of motion.      Cervical back: Normal range of motion and neck supple.   Skin:     Coloration: Skin is not pale.      Findings: No rash.   Neurological:      Mental Status: He is alert and oriented to person, place, and time.   Psychiatric:         Behavior: Behavior normal.         Thought Content: Thought content normal.         Judgment: Judgment normal.       Assessment:     1. Hyperlipidemia, mixed    2. Abnormal finding of blood chemistry, unspecified    3. Stenosis of carotid artery, unspecified laterality    4. Gastroesophageal reflux disease without esophagitis      Plan:   Hyperlipidemia, mixed  -     Comprehensive Metabolic Panel; Future; Expected date: 11/19/2024  -     Hemoglobin A1C; Future; Expected date: 11/19/2024  -     Lipid Panel; Future; Expected date: 11/19/2024  -     CBC Auto Differential; Future; Expected date: 11/19/2024    Abnormal finding of blood chemistry, unspecified  -     Hemoglobin A1C; Future; Expected date: 11/19/2024    Stenosis of carotid artery, unspecified laterality  counseled  Gastroesophageal reflux disease without esophagitis  counseled          Total time spent of Greater than 30 minutes minutes on the day of the visit.This includes face to face time and preparing to see the patient, obtaining and reviewing separately obtained history, documenting clinical information in the electronic or other health record, independently interpreting results and communicating results to the patient/family/caregiver, or care coordinator.    Established patient with me has been instructed that must see me at least 1 time yearly (every 365 days) for refills of  medications. Seeing other providers in this clinic is fine but expectation is to see me yearly.    Cooper Luz MD  11/19/2024    Portions of this note have been dictated with NATHEN Garcia.

## 2024-11-21 ENCOUNTER — LAB VISIT (OUTPATIENT)
Dept: LAB | Facility: HOSPITAL | Age: 84
End: 2024-11-21
Attending: FAMILY MEDICINE
Payer: MEDICARE

## 2024-11-21 DIAGNOSIS — R79.9 ABNORMAL FINDING OF BLOOD CHEMISTRY, UNSPECIFIED: ICD-10-CM

## 2024-11-21 DIAGNOSIS — E78.2 HYPERLIPIDEMIA, MIXED: ICD-10-CM

## 2024-11-21 LAB
ALBUMIN SERPL BCP-MCNC: 3.9 G/DL (ref 3.5–5.2)
ALP SERPL-CCNC: 95 U/L (ref 40–150)
ALT SERPL W/O P-5'-P-CCNC: 24 U/L (ref 10–44)
ANION GAP SERPL CALC-SCNC: 7 MMOL/L (ref 8–16)
AST SERPL-CCNC: 23 U/L (ref 10–40)
BASOPHILS # BLD AUTO: 0.03 K/UL (ref 0–0.2)
BASOPHILS NFR BLD: 0.3 % (ref 0–1.9)
BILIRUB SERPL-MCNC: 2.2 MG/DL (ref 0.1–1)
BUN SERPL-MCNC: 14 MG/DL (ref 8–23)
CALCIUM SERPL-MCNC: 9.3 MG/DL (ref 8.7–10.5)
CHLORIDE SERPL-SCNC: 108 MMOL/L (ref 95–110)
CHOLEST SERPL-MCNC: 116 MG/DL (ref 120–199)
CHOLEST/HDLC SERPL: 3.1 {RATIO} (ref 2–5)
CO2 SERPL-SCNC: 27 MMOL/L (ref 23–29)
CREAT SERPL-MCNC: 0.9 MG/DL (ref 0.5–1.4)
DIFFERENTIAL METHOD BLD: ABNORMAL
EOSINOPHIL # BLD AUTO: 0.1 K/UL (ref 0–0.5)
EOSINOPHIL NFR BLD: 1.6 % (ref 0–8)
ERYTHROCYTE [DISTWIDTH] IN BLOOD BY AUTOMATED COUNT: 12.8 % (ref 11.5–14.5)
EST. GFR  (NO RACE VARIABLE): >60 ML/MIN/1.73 M^2
ESTIMATED AVG GLUCOSE: 157 MG/DL (ref 68–131)
GLUCOSE SERPL-MCNC: 165 MG/DL (ref 70–110)
HBA1C MFR BLD: 7.1 % (ref 4–5.6)
HCT VFR BLD AUTO: 48.9 % (ref 40–54)
HDLC SERPL-MCNC: 38 MG/DL (ref 40–75)
HDLC SERPL: 32.8 % (ref 20–50)
HGB BLD-MCNC: 16.6 G/DL (ref 14–18)
IMM GRANULOCYTES # BLD AUTO: 0.03 K/UL (ref 0–0.04)
IMM GRANULOCYTES NFR BLD AUTO: 0.3 % (ref 0–0.5)
LDLC SERPL CALC-MCNC: 60 MG/DL (ref 63–159)
LYMPHOCYTES # BLD AUTO: 2.6 K/UL (ref 1–4.8)
LYMPHOCYTES NFR BLD: 29.9 % (ref 18–48)
MCH RBC QN AUTO: 31.7 PG (ref 27–31)
MCHC RBC AUTO-ENTMCNC: 33.9 G/DL (ref 32–36)
MCV RBC AUTO: 94 FL (ref 82–98)
MONOCYTES # BLD AUTO: 0.5 K/UL (ref 0.3–1)
MONOCYTES NFR BLD: 6 % (ref 4–15)
NEUTROPHILS # BLD AUTO: 5.4 K/UL (ref 1.8–7.7)
NEUTROPHILS NFR BLD: 61.9 % (ref 38–73)
NONHDLC SERPL-MCNC: 78 MG/DL
NRBC BLD-RTO: 0 /100 WBC
PLATELET # BLD AUTO: 209 K/UL (ref 150–450)
PMV BLD AUTO: 12.2 FL (ref 9.2–12.9)
POTASSIUM SERPL-SCNC: 4.7 MMOL/L (ref 3.5–5.1)
PROT SERPL-MCNC: 7 G/DL (ref 6–8.4)
RBC # BLD AUTO: 5.23 M/UL (ref 4.6–6.2)
SODIUM SERPL-SCNC: 142 MMOL/L (ref 136–145)
TRIGL SERPL-MCNC: 90 MG/DL (ref 30–150)
WBC # BLD AUTO: 8.72 K/UL (ref 3.9–12.7)

## 2024-11-21 PROCEDURE — 80061 LIPID PANEL: CPT | Mod: HCNC | Performed by: FAMILY MEDICINE

## 2024-11-21 PROCEDURE — 85025 COMPLETE CBC W/AUTO DIFF WBC: CPT | Mod: HCNC | Performed by: FAMILY MEDICINE

## 2024-11-21 PROCEDURE — 83036 HEMOGLOBIN GLYCOSYLATED A1C: CPT | Mod: HCNC | Performed by: FAMILY MEDICINE

## 2024-11-21 PROCEDURE — 80053 COMPREHEN METABOLIC PANEL: CPT | Mod: HCNC | Performed by: FAMILY MEDICINE

## 2024-11-22 ENCOUNTER — TELEPHONE (OUTPATIENT)
Dept: FAMILY MEDICINE | Facility: CLINIC | Age: 84
End: 2024-11-22
Payer: MEDICARE

## 2024-11-22 DIAGNOSIS — E11.65 TYPE 2 DIABETES MELLITUS WITH HYPERGLYCEMIA, WITHOUT LONG-TERM CURRENT USE OF INSULIN: Primary | ICD-10-CM

## 2024-11-22 RX ORDER — METFORMIN HYDROCHLORIDE 500 MG/1
500 TABLET ORAL
Qty: 90 TABLET | Refills: 3 | Status: SHIPPED | OUTPATIENT
Start: 2024-11-22 | End: 2025-11-22

## 2024-11-22 NOTE — TELEPHONE ENCOUNTER
Fran Rivera Staff     ----- Message from Fran sent at 11/22/2024  4:35 PM CST -----  Type: Needs Medical Advice  Who Called:  pt  Pharmacy name and phone #:    Best Call Back Number: [phone]  Additional Information: pt is calling the office for a call back about lab results, please and thank you  
Patient states he was advised he is now diabetic.  Patient requesting to know A1C level.  Writer advised patient current A1C is 7.1.  Patient verbalized understanding.    
36.7

## 2024-12-05 ENCOUNTER — CLINICAL SUPPORT (OUTPATIENT)
Dept: DIABETES | Facility: CLINIC | Age: 84
End: 2024-12-05
Payer: MEDICARE

## 2024-12-05 DIAGNOSIS — E11.65 TYPE 2 DIABETES MELLITUS WITH HYPERGLYCEMIA, WITHOUT LONG-TERM CURRENT USE OF INSULIN: ICD-10-CM

## 2024-12-05 PROCEDURE — G0108 DIAB MANAGE TRN  PER INDIV: HCPCS | Mod: HCNC,S$GLB,, | Performed by: NUTRITIONIST

## 2024-12-05 PROCEDURE — 99999 PR PBB SHADOW E&M-EST. PATIENT-LVL III: CPT | Mod: PBBFAC,HCNC,, | Performed by: NUTRITIONIST

## 2024-12-05 NOTE — PROGRESS NOTES
Diabetes Care Specialist Progress Note  Author: Sandra Falcon RD  Date: 12/5/2024    Referral 11/22/24    Intake    Program Intake  Reason for Diabetes Program Visit:: Initial Diabetes Assessment  Current diabetes risk level:: low (HA1C=7.1)  In the last month, have you used the ER or been admitted to the hospital: No  Permission to speak with others about care:: yes (wife, Genie, also present)    Current Diabetes Treatment: Oral Medications  Oral Medication Type/Dose:  (500 mg Metformin at b'fast)--pt not sure if he wants to take or not    Continuous Glucose Monitoring  Patient has CGM: No    Lab Results   Component Value Date    HGBA1C 7.1 (H) 11/21/2024     Lifestyle Coping Support & Clinical    Lifestyle/Coping/Support  Compared to other people your age, how would you rate your health?: Good  Does anyone in your family have diabetes or does anyone in your family support you in your diabetes care?:  (supporrt from family/wife)  Learning Barriers:: None  Culture or Christianity beliefs that may impact ability to access healthcare: No  Psychosocial/Coping Skills Assessment Completed: : Yes  Assessment indicates:: Adequate understanding  Area of need?: No    Problem Review  Active Comorbidities: Cancer, Cardiovascular Disease, Gastrointestinal Disorder (basal cellcarcinoma)    Diabetes Self-Management Skills Assessment    Medication Skills Assessment  Patient is able to identify current diabetes medications, dosages, and appropriate timing of medications.: yes  Patient reports problems or concerns with current medication regimen.: yes  Medication regimen problems/concerns:: other (see comments) (Pt not certain if he wants to start meds; may prefer to try lifestyle changes first.)  Patient is  aware that some diabetes medications can cause low blood sugar?: Yes  Medication Skills Assessment Completed:: Yes  Assessment indicates:: Adequate understanding  Area of need?: Yes (Explained to pt that if he is uncertain about  taking Metformin, he should not take a day and then not take for a while.  he needs to be consistent if he is taking or not. Can make comments on BG log to indicate if or when he decides to take)    Diabetes Disease Process/Treatment Options  Diabetes Type?: Type II  When were you diagnosed?:  (Nov 2024)  If previous diabetes education, when/where::  (none)  What are your goals for this education session?:  (what to eat)  Is patient aware of what causes diabetes?: Yes  Does patient understand the pathophysiology of diabetes?: Yes  Diabetes Disease Process/Treatment Options: Skills Assessment Completed: Yes  Assessment indicates:: Adequate understanding  Area of need?: No    Nutrition/Healthy Eating  Meal Plan 24 Hour Recall - Breakfast:  (egg, rye bread w/butter; premeir PRO)  Meal Plan 24 Hour Recall - Lunch:  (larger meal--salmon, spaghetti squash, mirliton w/shrimp)  Meal Plan 24 Hour Recall - Dinner:  (egg sandwich)  Meal Plan 24 Hour Recall - Snack:  (HS--SF cookies)  Meal Plan 24 Hour Recall - Beverage:  (water)  Who shops/cooks?:  (pt and wife)  Patient can identify foods that impact blood sugar.: yes  Challenges to healthy eating:: portion control, other (see comments), snacking between meals and at night  Nutrition/Healthy Eating Skills Assessment Completed:: Yes  Assessment indicates:: Instruction Needed  Area of need?: Yes    Physical Activity/Exercise  Patient's daily activity level:: lightly active  Patient formally exercises outside of work.: yes  Frequency: four or more times a week  Patient can identify forms of physical activity.: yes  Physical Activity/Exercise Skills Assessment Completed: : Yes  Assessment indicates:: Adequate understanding  Area of need?: No    Home Blood Glucose Monitoring  Patient states that blood sugar is checked at home daily.: yes  Monitoring Method:: home glucometer  Fasting BG range history::  ()  Premeal BG range history::  (170-178)  How often do you check your  blood sugar?:  (once daily)  What is your A1c Target?:  (<7.0)  Home Blood Glucose Monitoring Skills Assessment Completed: : Yes  Assessment indicates:: Adequate understanding  Area of need?: No    Acute Complications  Acute Complications Skills Assessment Completed: : No  Deffered due to:: Time  Area of need?: Deferred    Chronic Complications  Reviewed health maintenance: yes  Have you completed your annual diabetes maintenance labwork? : yes  Has your doctor examined your feet?: yes  Do you see an eye doctor?: yes  Chronic Complications Skills Assessment Completed: : Yes  Assessment indicates:: Adequate understanding  Area of need?: No      Assessment Summary and Plan    Based on today's diabetes care assessment, the following areas of need were identified:      Identified Areas of Need      Medication/Current Diabetes Treatment: Yes (Explained to pt that if he is uncertain about taking Metformin, he should not take one day and then not take for a while.  e needs to be consistent if he is taking or not. Can make comments on BG log to indicate if or when he decides to take)   Lifestyle Coping/Support: No   Diabetes Disease Process/Treatment Options: No--Discussed pathology of Type 2 diabetes, risk factors and treatment options.      Nutrition/Healthy Eating: Yes --see Care Plan   Physical Activity/Exercise: No --encouraged continued exercise/walking ans weights/resistance   Home Blood Glucose Monitoring: No --reviewed target BG and HA1C; pt will check BG once daily and keep BG log   Acute Complications: Deferred    Chronic Complications: No       Today's interventions were provided through individual discussion, instruction, and written materials were provided.      Patient verbalized understanding of instruction and written materials.  Pt was able to return back demonstration of instructions today. Patient understood key points, needs reinforcement and further instruction.     Diabetes Self-Management Care  Plan:    Today's Diabetes Self-Management Care Plan was developed with Willie's input. Willie has agreed to work toward the following goal(s) to improve his/her overall diabetes control.      Care Plan: Diabetes Management   Updates made since 12/6/2023 12:00 AM        Problem: Healthy Eating         Goal: Eat 3 meals daily with 45 g/3 servings of Carbohydrate per meal.    Start Date: 12/5/2024   Expected End Date: 3/5/2025   Priority: High   Barriers: No Barriers Identified   Note:    Pt and wife come in today with a lot of questions and concerns.  They have been cutting out all sugars and trying not to eat many carbs at all; wife indicates she thought that a carnivore diet might be good for them.  Explained importance of well balanced diet with all food groups included; importance of consistent meals with consistent amount of carbs per meal.   Also combine carbs w/PRO.  Pt feels he is eating this way already.  Pt did indicate that he has stopped snacking on candy and cookies all day and no more juice.  Drinking only water and Premier PRO (instead of coffee).  Gave the above guidelines for pt to follow for amount of healthy carbs per meal; pt and wife will check serving sizes and read more labels looking at Total Carbs, to see if appropriate to consume.  Also reviewed heart healthiness since wife and pt had many questions about fat, meat, sodium, etc.       Task: Reviewed the sources and role of Carbohydrate, Protein, and Fat and how each nutrient impacts blood sugar. Completed 12/5/2024        Task: Provided visual examples using dry measuring cups, food models, and other familiar objects such as computer mouse, deck or cards, tennis ball etc. to help with visualization of portions. Completed 12/5/2024        Task: Explained how to count carbohydrates using the food label and the use of dry measuring cups for accurate carb counting. Completed 12/5/2024        Task: Discussed strategies for choosing healthier menu  options when dining out. Completed 12/5/2024        Task: Recommended replacing beverages containing high sugar content with noncaloric/sugar free options and/or water. Completed 12/5/2024        Task: Review the importance of balancing carbohydrates with each meal using portion control techniques to count servings of carbohydrate and label reading to identify serving size and amount of total carbs per serving. Completed 12/5/2024        Task: Provided Sample plate method and reviewed the use of the plate to estimate amounts of carbohydrate per meal. Completed 12/5/2024          Follow Up Plan     Follow up for Pt knows how to reach [facility] by phone (no portal).  They will call for f/u as needed..    Today's care plan and follow up schedule was discussed with patient.  Willie verbalized understanding of the care plan, goals, and agrees to follow up plan.        The patient was encouraged to communicate with his/her health care provider/physician and care team regarding his/her condition(s) and treatment.  I provided the patient with my contact information today and encouraged to contact me via phone or [facility] Patient Portal as needed.     Length of Visit   Total Time: 70 Minutes

## 2024-12-18 ENCOUNTER — PATIENT OUTREACH (OUTPATIENT)
Dept: DIABETES | Facility: CLINIC | Age: 84
End: 2024-12-18
Payer: MEDICARE

## 2024-12-18 NOTE — PATIENT INSTRUCTIONS
Returned call to patient's wife, Genie, who is calling to request more BG logs for pt to use.  Printed off more and left in envelope at lab check in for pt/wife to , per their request.

## 2025-01-10 ENCOUNTER — TELEPHONE (OUTPATIENT)
Dept: FAMILY MEDICINE | Facility: CLINIC | Age: 85
End: 2025-01-10
Payer: MEDICARE

## 2025-01-19 ENCOUNTER — HOSPITAL ENCOUNTER (EMERGENCY)
Facility: HOSPITAL | Age: 85
Discharge: HOME | End: 2025-01-19
Attending: EMERGENCY MEDICINE
Payer: MEDICARE

## 2025-01-19 VITALS
TEMPERATURE: 97 F | BODY MASS INDEX: 25.2 KG/M2 | HEART RATE: 83 BPM | HEIGHT: 71 IN | OXYGEN SATURATION: 98 % | RESPIRATION RATE: 17 BRPM | WEIGHT: 180 LBS | SYSTOLIC BLOOD PRESSURE: 149 MMHG | DIASTOLIC BLOOD PRESSURE: 71 MMHG

## 2025-01-19 DIAGNOSIS — R33.9 URINARY RETENTION: Primary | ICD-10-CM

## 2025-01-19 LAB
BILIRUB UR QL STRIP: NEGATIVE
CLARITY UR: CLEAR
COLOR UR: YELLOW
GLUCOSE UR QL STRIP: NEGATIVE
HGB UR QL STRIP: ABNORMAL
KETONES UR QL STRIP: ABNORMAL
LEUKOCYTE ESTERASE UR QL STRIP: ABNORMAL
MICROSCOPIC COMMENT: ABNORMAL
NITRITE UR QL STRIP: NEGATIVE
PH UR STRIP: 5 [PH] (ref 5–8)
POCT GLUCOSE: 156 MG/DL (ref 70–110)
PROT UR QL STRIP: NEGATIVE
RBC #/AREA URNS HPF: >100 /HPF (ref 0–4)
SP GR UR STRIP: 1.01 (ref 1–1.03)
URN SPEC COLLECT METH UR: ABNORMAL
UROBILINOGEN UR STRIP-ACNC: NEGATIVE EU/DL
WBC #/AREA URNS HPF: 35 /HPF (ref 0–5)

## 2025-01-19 PROCEDURE — 81001 URINALYSIS AUTO W/SCOPE: CPT | Performed by: EMERGENCY MEDICINE

## 2025-01-19 PROCEDURE — 25000003 PHARM REV CODE 250

## 2025-01-19 PROCEDURE — 99283 EMERGENCY DEPT VISIT LOW MDM: CPT

## 2025-01-19 PROCEDURE — 25000003 PHARM REV CODE 250: Performed by: STUDENT IN AN ORGANIZED HEALTH CARE EDUCATION/TRAINING PROGRAM

## 2025-01-19 PROCEDURE — 25000003 PHARM REV CODE 250: Performed by: EMERGENCY MEDICINE

## 2025-01-19 PROCEDURE — 99449 NTRPROF PH1/NTRNET/EHR 31/>: CPT | Mod: ,,, | Performed by: UROLOGY

## 2025-01-19 PROCEDURE — 82962 GLUCOSE BLOOD TEST: CPT

## 2025-01-19 PROCEDURE — 87086 URINE CULTURE/COLONY COUNT: CPT | Performed by: EMERGENCY MEDICINE

## 2025-01-19 RX ORDER — LIDOCAINE HYDROCHLORIDE 20 MG/ML
JELLY TOPICAL
Status: COMPLETED | OUTPATIENT
Start: 2025-01-19 | End: 2025-01-19

## 2025-01-19 RX ORDER — CEFUROXIME AXETIL 500 MG/1
500 TABLET ORAL 2 TIMES DAILY
Qty: 20 TABLET | Refills: 0 | Status: SHIPPED | OUTPATIENT
Start: 2025-01-19 | End: 2025-01-29

## 2025-01-19 RX ORDER — MORPHINE SULFATE 2 MG/ML
2 INJECTION, SOLUTION INTRAMUSCULAR; INTRAVENOUS
Status: DISCONTINUED | OUTPATIENT
Start: 2025-01-19 | End: 2025-01-19 | Stop reason: HOSPADM

## 2025-01-19 RX ORDER — CEFUROXIME AXETIL 250 MG/1
500 TABLET ORAL ONCE
Status: COMPLETED | OUTPATIENT
Start: 2025-01-19 | End: 2025-01-19

## 2025-01-19 RX ADMIN — CEFUROXIME AXETIL 500 MG: 250 TABLET, FILM COATED ORAL at 06:01

## 2025-01-19 RX ADMIN — LIDOCAINE HYDROCHLORIDE 10 ML: 20 JELLY TOPICAL at 05:01

## 2025-01-19 NOTE — ED NOTES
Leg bag placed per patient request. Education on vargas care provided, all questions answered. AVS given, discharge instructions and medications reviewed. VSS at discharge. Pt with family member at bedside for ride home. NADN upon discharge, all questions answered. Patient ambulated out or JATIN with steady even gait.

## 2025-01-19 NOTE — ED NOTES
Reddy cath placement with 14Fr Coude cath attempted. Unable to pass cath into the urethra, MD notified.

## 2025-01-19 NOTE — ED NOTES
I. Woody at bedside for meatal dilator and vargas placement. Pt tolerated well. Urine return obtained. Vargas secured.

## 2025-01-19 NOTE — ED PROVIDER NOTES
Encounter Date: 1/19/2025       History     Chief Complaint   Patient presents with    Urinary Retention     Pt had turp about a month ago with no complications.  Pt feels the need to urinate but unable to pass urine      HPI    Patient is a 4-year-old male with past medical history of previous TURP, urethral stricture presenting for acute urinary retention.  Reports that he noticed that he was peeing less and less than normal yesterday.  This morning he woke up and said that he had difficulty and was unable to urinate.  Tried multiple times throughout the day and then was unable to pee.  His bladder felt more distended and associated with pelvic pain.  On exam patient is in severe discomfort and pacing back and forth in the room.    Patient reports that he had previous TURP in December and had Reddy placed following and that was removed 2 days later.    Review of patient's allergies indicates:   Allergen Reactions    No known drug allergies      Past Medical History:   Diagnosis Date    Basal cell carcinoma     Carotid stenosis     Coronary artery disease     Gastritis     GERD (gastroesophageal reflux disease)     H. pylori infection     Hyperlipidemia     Shingles      Past Surgical History:   Procedure Laterality Date    arthroscope Left     knee arthroscopy, torn meniscus    COLONOSCOPY      Adrien    CORONARY ANGIOGRAPHY N/A 2/7/2022    Procedure: ANGIOGRAM, CORONARY ARTERY;  Surgeon: John Linn MD;  Location: [facility] CATH/EP LAB;  Service: Cardiology;  Laterality: N/A;    ECTROPION REPAIR Bilateral 3/13/2020    Procedure: REPAIR, ECTROPION, EYELID FLAPS - (Skin and/or Deep Tissue ;  Surgeon: Sarah Blackmon MD;  Location: [facility] OR [address] [place] [address];  Service: Ophthalmology;  Laterality: Bilateral;    LEFT HEART CATHETERIZATION Left 2/7/2022    Procedure: CATHETERIZATION, HEART, LEFT;  Surgeon: John Linn MD;  Location: [facility] CATH/EP LAB;  Service: Cardiology;  Laterality: Left;     Family History    Problem Relation Name Age of Onset    Heart disease Father  65        MI    Hyperlipidemia Father      Stroke Father      COPD Mother      Glaucoma Mother      No Known Problems Sister      No Known Problems Brother      No Known Problems Daughter      Diabetes Unknown          uncles  and aunts     Cancer Paternal Uncle          prostate cancer    Melanoma Neg Hx       Social History     Tobacco Use    Smoking status: Former     Current packs/day: 0.00     Types: Cigarettes     Quit date: 1997     Years since quittin.5    Smokeless tobacco: Never   Substance Use Topics    Alcohol use: No    Drug use: No     Review of Systems   Constitutional:  Negative for fever.   HENT:  Negative for sore throat.    Respiratory:  Negative for shortness of breath.    Cardiovascular:  Negative for chest pain.   Gastrointestinal:  Negative for nausea.   Genitourinary:  Negative for dysuria.        Acute urinary retention   Musculoskeletal:  Negative for back pain.   Skin:  Negative for rash.   Neurological:  Negative for weakness.   Hematological:  Does not bruise/bleed easily.       Physical Exam     Initial Vitals [25 0100]   BP Pulse Resp Temp SpO2   (!) 221/84 103 18 99.4 °F (37.4 °C) 98 %      MAP       --         Physical Exam    Nursing note and vitals reviewed.  Constitutional: He appears well-developed.   Patient appears in discomfort secondary to pelvic pain   HENT:   Head: Normocephalic.   Eyes: Pupils are equal, round, and reactive to light.   Neck:   Normal range of motion.  Cardiovascular:  Normal rate and normal heart sounds.           Pulmonary/Chest: Breath sounds normal.   Abdominal: Abdomen is soft.   Genitourinary:    Penis normal.     Musculoskeletal:         General: Normal range of motion.      Cervical back: Normal range of motion.     Neurological: He is alert and oriented to person, place, and time.   Skin: Skin is warm.         ED Course   Procedures  Labs Reviewed   URINALYSIS, REFLEX TO  "URINE CULTURE - Abnormal       Result Value    Specimen UA Urine, Clean Catch      Color, UA Yellow      Appearance, UA Clear      pH, UA 5.0      Specific Gravity, UA 1.010      Protein, UA Negative      Glucose, UA Negative      Ketones, UA Trace (*)     Bilirubin (UA) Negative      Occult Blood UA 3+ (*)     Nitrite, UA Negative      Urobilinogen, UA Negative      Leukocytes, UA 2+ (*)     Narrative:     Specimen Source->Urine   URINALYSIS MICROSCOPIC - Abnormal    RBC, UA >100 (*)     WBC, UA 35 (*)     Microscopic Comment SEE COMMENT      Narrative:     Specimen Source->Urine   POCT GLUCOSE - Abnormal    POCT Glucose 156 (*)    CULTURE, URINE   POCT GLUCOSE MONITORING CONTINUOUS          Imaging Results    None          Medications   morphine injection 2 mg (2 mg Intravenous Not Given 1/19/25 0145)   LIDOcaine HCl 2% urojet (10 mLs Urethral Given 1/19/25 0520)   LIDOcaine HCl 2% urojet (10 mLs Mucous Membrane Given 1/19/25 0520)   cefUROXime tablet 500 mg (500 mg Oral Given 1/19/25 0624)     [facility]  Patient is a 84 y.o. male with past medical history of urethral stricture, recent TURP, presenting for acute urinary retention.    Initial Vitals:  BP: (!) 221/84  Pulse: 103  Temp: 99.4 °F (37.4 °C)  Resp: 18  Height: 5' 11" (180.3 cm)  Weight: 81.6 kg (180 lb)  BMI (Calculated): 25.1  SpO2: 98 %    Differential Diagnosis: I considered acute urinary retention, urethral stricture, UTI, pyelonephritis, hydronephrosis, among other diagnoses    Update:   Bedside ultrasound showed greater than 650 mL of urine.  Attempt Reddy with 14 coude but was unable to advance due to resistance at the tip.  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  Recommend blue meatal dilator and 16 Lebanese straight tip.    Dilated at bedside and 16 Lebanese Reddy was placed without acute complications.  Output of approximately 300 mL in the Reddy bag following.  UA shows evidence of infection.  Plan to " discharge with Ceftin and will give 1st dose in the ED now.    Patient to follow-up with his urologist Dr Watkins and I also message him for close follow up.  Strict return precautions patient discharged stable condition.  Repeat blood pressure on discharge 149/71.    Disposition:  Discharge.  Close follow up with Urology      See ED course for updates. Patients seen and discussed with attending physician. Attestation to follow.    Rajinder Styles MD  [facility] Emergency Medicine [facility]        Medical Decision Making  Risk  Prescription drug management.                                      Clinical Impression:  Final diagnoses:  [R33.9] Urinary retention (Primary)          ED Disposition Condition    Discharge Stable          ED Prescriptions       Medication Sig Dispense Start Date End Date Auth. Provider    cefUROXime (CEFTIN) 500 MG tablet Take 1 tablet (500 mg total) by mouth 2 (two) times daily. for 10 days 20 tablet 1/19/2025 1/29/2025 Rajinder Styles MD          Follow-up Information       Follow up With Specialties Details Why Contact Info Additional Information    Cooper Luz MD Family Medicine Schedule an appointment as soon as possible for a visit  Follow-up for general checkup [address] [facility] [address]  [phone]       [facility] - Emergency Dept Emergency Medicine Go to  As needed, If symptoms worsen [address] [facility] [id number] [phone] 1st floor             Rajinder Styles MD  Resident  01/19/25 0706

## 2025-01-19 NOTE — CONSULTS
"Urology Interprofessional Telephone Consult:    Consult from: [facility] ED - Sushil Styles, Woody, Orestes  Consult for: unable to place vargas  - discussion of pt with above mds and nursing staff with pt having consented to discussion of care with specialists    we have a patient Roland with hx of turp and urethral stricture here for AUR. We tried the smallest we could find 14 coude cath but unable to pass even the tip. US shows > 650 ml     Discussed with Dr Styles after above noting pt od Dr AREVALO with history of urethral stricture, and had TURP in December, and in retention as above unable to place vargas. Noted 16 coude then 14 coude tried and could only get in 1/2 inch then not pass.    Spoke with nurse noting above and elaborated just in urethra it wouldn't pass and with 16 fr coude so pushed rotated twisted trying to pass 2-3 mins and then with 14fr coude per instruction. These were only 2 foleys tried.    Discussed with all above MDs if TURPed prostate obstruction resolved so no coude needed, and not to twist coude tip.    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     Advised no further vargas pushing/twisting to avoid obliterativr stricture/walling off distal urethra - though usually firm not thin/weblike less prone to this so can use blue meatal dilator then pass silicone vargas    Not avail at [facility] so I called [facility] house supervisor to get one from OR for security to bring to [facility] and provided bedside nurse and attending mDs noted above with direction:    It's tapered so you lubricate hold penis up, visualize meatus, pass tip right into center and push til "just snug" usually to 1/2 or 3/4 of it. Have silicone kit standing by to do simultaneously. May see meatal bleeding but this safe tapered slick tool is less traumatic than above story and (unless walled off from vargas attempts) should resolve this " and passively stretch area to place vargas through using routine 16fr vargas kit    ER confirmed able to get vargas in with above technique, and pt DCed home to follow up with Dr Watkins.    Vargas x1 week given retention/stricture/possible vargas trauma  FU Dr Muñoz    >45 mins spent in above encounter with chart review (though no urologic record or op notes avail), and >50% spent in extensive care coordination and communication or recommendations and plan as above  Discussed verbally with Dr Styles and via mesaging with Sushil Carlisle and Woody, and recs provided in writing in chart

## 2025-01-19 NOTE — DISCHARGE INSTRUCTIONS
Please return to emergency department if you develop fevers not improved with Tylenol ibuprofen, noticed blood in your urine, able to pass urine, or for any other concerns.    Please follow-up with your urologist for Reddy management.

## 2025-01-21 LAB — BACTERIA UR CULT: NO GROWTH

## 2025-01-27 ENCOUNTER — HOSPITAL ENCOUNTER (EMERGENCY)
Facility: HOSPITAL | Age: 85
Discharge: HOME | End: 2025-01-28
Attending: EMERGENCY MEDICINE
Payer: MEDICARE

## 2025-01-27 DIAGNOSIS — R33.9 URINARY RETENTION: Primary | ICD-10-CM

## 2025-01-27 PROCEDURE — 51702 INSERT TEMP BLADDER CATH: CPT

## 2025-01-27 PROCEDURE — 99283 EMERGENCY DEPT VISIT LOW MDM: CPT

## 2025-01-28 VITALS
TEMPERATURE: 98 F | BODY MASS INDEX: 24.5 KG/M2 | SYSTOLIC BLOOD PRESSURE: 161 MMHG | WEIGHT: 175 LBS | RESPIRATION RATE: 18 BRPM | HEART RATE: 84 BPM | DIASTOLIC BLOOD PRESSURE: 79 MMHG | HEIGHT: 71 IN | OXYGEN SATURATION: 97 %

## 2025-01-28 NOTE — ED PROVIDER NOTES
Encounter Date: 1/27/2025       History     Chief Complaint   Patient presents with    Urinary Retention     Had catheter placed here on the 19th, removed in office today     Chief complaint is urinary retention.  This patient 12 hours ago had his catheter removed.  He has not urinated since it is in dire pain at the present time.  Heart rate 117.        Review of patient's allergies indicates:   Allergen Reactions    No known drug allergies      Past Medical History:   Diagnosis Date    Basal cell carcinoma     Carotid stenosis     Coronary artery disease     Gastritis     GERD (gastroesophageal reflux disease)     H. pylori infection     Hyperlipidemia     Shingles      Past Surgical History:   Procedure Laterality Date    arthroscope Left     knee arthroscopy, torn meniscus    COLONOSCOPY      Adrien    CORONARY ANGIOGRAPHY N/A 2/7/2022    Procedure: ANGIOGRAM, CORONARY ARTERY;  Surgeon: John Linn MD;  Location: [facility] CATH/EP LAB;  Service: Cardiology;  Laterality: N/A;    ECTROPION REPAIR Bilateral 3/13/2020    Procedure: REPAIR, ECTROPION, EYELID FLAPS - (Skin and/or Deep Tissue ;  Surgeon: Sarah Blackmon MD;  Location: [facility] OR [address] [place] [address];  Service: Ophthalmology;  Laterality: Bilateral;    LEFT HEART CATHETERIZATION Left 2/7/2022    Procedure: CATHETERIZATION, HEART, LEFT;  Surgeon: John Linn MD;  Location: [facility] CATH/EP LAB;  Service: Cardiology;  Laterality: Left;     Family History   Problem Relation Name Age of Onset    Heart disease Father  65        MI    Hyperlipidemia Father      Stroke Father      COPD Mother      Glaucoma Mother      No Known Problems Sister      No Known Problems Brother      No Known Problems Daughter      Diabetes Unknown          uncles  and aunts     Cancer Paternal Uncle          prostate cancer    Melanoma Neg Hx       Social History     Tobacco Use    Smoking status: Former     Current packs/day: 0.00     Types: Cigarettes     Quit date: 7/5/1997     Years  since quittin.5    Smokeless tobacco: Never   Substance Use Topics    Alcohol use: No    Drug use: No     Review of Systems   Constitutional:  Negative for chills and fever.   HENT:  Negative for ear pain, rhinorrhea and sore throat.    Eyes:  Negative for pain and visual disturbance.   Respiratory:  Negative for cough and shortness of breath.    Cardiovascular:  Negative for chest pain and palpitations.   Gastrointestinal:  Positive for abdominal pain. Negative for constipation, diarrhea, nausea and vomiting.   Genitourinary:  Negative for dysuria, frequency, hematuria and urgency.   Musculoskeletal:  Negative for back pain, joint swelling and myalgias.   Skin:  Negative for rash.   Neurological:  Negative for dizziness, seizures, weakness and headaches.   Psychiatric/Behavioral:  Negative for dysphoric mood. The patient is not nervous/anxious.        Physical Exam     Initial Vitals [25 2321]   BP Pulse Resp Temp SpO2   -- (!) 117 18 97.5 °F (36.4 °C) 97 %      MAP       --         Physical Exam    Nursing note and vitals reviewed.  Constitutional: He appears well-developed and well-nourished.   HENT:   Head: Normocephalic and atraumatic.   Eyes: Conjunctivae, EOM and lids are normal. Pupils are equal, round, and reactive to light.   Neck: Trachea normal. Neck supple. No thyroid mass present.   Cardiovascular:  Normal rate, regular rhythm and normal heart sounds.           Pulmonary/Chest: Breath sounds normal. No respiratory distress.   Abdominal: Abdomen is soft. There is no abdominal tenderness.   Lower abdominal pain   Genitourinary:    Penis normal.     Musculoskeletal:         General: Normal range of motion.      Cervical back: Neck supple.     Neurological: He is alert and oriented to person, place, and time. He has normal strength and normal reflexes. No cranial nerve deficit or sensory deficit.   Skin: Skin is warm and dry.   Psychiatric: He has a normal mood and affect. His speech is normal  and behavior is normal. Judgment and thought content normal.         ED Course   Procedures  Labs Reviewed   POCT CREATININE          Imaging Results    None          Medications - No data to display  Medical Decision Making  The patient is here for urinary retention.  Catheter placed and the patient is feeling better.  Will discharge patient with catheter and follow-up.[facility]  Samy Bond MD  12:02 AM 01/28/2025                                            Clinical Impression:  Final diagnoses:  [R33.9] Urinary retention (Primary)          ED Disposition Condition    Discharge Stable          ED Prescriptions    None       Follow-up Information    None          Samy Bond MD  01/28/25 0002

## 2025-02-03 ENCOUNTER — HOSPITAL ENCOUNTER (OUTPATIENT)
Dept: RADIOLOGY | Facility: HOSPITAL | Age: 85
Discharge: HOME | End: 2025-02-03
Attending: SPECIALIST
Payer: MEDICARE

## 2025-02-03 ENCOUNTER — HOSPITAL ENCOUNTER (OUTPATIENT)
Dept: PREADMISSION TESTING | Facility: HOSPITAL | Age: 85
Discharge: HOME | End: 2025-02-03
Attending: SPECIALIST
Payer: MEDICARE

## 2025-02-03 VITALS
DIASTOLIC BLOOD PRESSURE: 68 MMHG | HEART RATE: 90 BPM | TEMPERATURE: 99 F | RESPIRATION RATE: 18 BRPM | BODY MASS INDEX: 23.82 KG/M2 | OXYGEN SATURATION: 97 % | SYSTOLIC BLOOD PRESSURE: 130 MMHG | HEIGHT: 71 IN | WEIGHT: 170.13 LBS

## 2025-02-03 DIAGNOSIS — Z01.818 PREOP TESTING: Primary | ICD-10-CM

## 2025-02-03 DIAGNOSIS — Z01.818 PREOP TESTING: ICD-10-CM

## 2025-02-03 PROCEDURE — 71046 X-RAY EXAM CHEST 2 VIEWS: CPT | Mod: TC

## 2025-02-03 PROCEDURE — 71046 X-RAY EXAM CHEST 2 VIEWS: CPT | Mod: 26,,, | Performed by: RADIOLOGY

## 2025-02-03 PROCEDURE — 93005 ELECTROCARDIOGRAM TRACING: CPT | Performed by: INTERNAL MEDICINE

## 2025-02-03 PROCEDURE — 93010 ELECTROCARDIOGRAM REPORT: CPT | Mod: ,,, | Performed by: INTERNAL MEDICINE

## 2025-02-03 RX ORDER — CEFAZOLIN 2 G/1
2 INJECTION, POWDER, FOR SOLUTION INTRAMUSCULAR; INTRAVENOUS ONCE
Status: CANCELLED | OUTPATIENT
Start: 2025-02-06

## 2025-02-03 NOTE — DISCHARGE INSTRUCTIONS
To confirm, Your doctor has instructed you that surgery is scheduled for:  Thursday 2/6/25    Pre-Op will call the afternoon prior to surgery between 4:00 and 6:00 PM with the final arrival time.      Please report to Registration at front of the hospital --it is best to park in the garage on [address]    Do not eat anything after midnight the night before your surgery - You may have clear liquids up to 2 hours before coming in for surgery. This includes water, Gatorade, clear juice,     YOU MAY BRUSH YOUR TEETH     TAKE APPROVED  MEDICATIONS WITH A SMALL SIP OF WATER THE MORNING OF YOUR PROCEDURE: See Medication list    ONLY if you are diabetic, check your sugar in the morning before your procedure.       Do not take any diabetic medicines or insulin the morning of surgery .     PLEASE NOTE:  The surgery schedule has many variables which may affect the time of your surgery case.  Family members should be available if your surgery time changes.  Plan to be here the day of your procedure between 4-6 hours.    DO NOT TAKE THESE MEDICATIONS 5-7 DAYS PRIOR to your procedure or per your surgeon's request: ASPIRIN, ALEVE, ADVIL, IBUPROFEN,  RAMONE SELTZER, BC , FISH OIL , VITAMIN E, HERBALS  (May take Tylenol)        IMPORTANT INSTRUCTIONS    Do not smoke, vape or drink alcoholic beverages 24 hours prior to your procedure.  Shower the night before AND the morning of your procedure with a Chlorhexidine wash such as Hibiclens or Dial antibacterial soap from the neck down.    Do not get it on your face or in your eyes.  You may use your own shampoo and face wash. This helps your skin to be as bacteria free as possible.   Do not apply any deodorant, lotion or powder after you shower.   DO NOT remove hair from the surgery site.  Do not shave the incision site unless you are given specific instructions to do so.    Sleep in a bed with clean sheets.  Do not sleep with a pet in the bed.   If you wear contact lenses, dentures,  hearing aids or glasses, bring a container to put them in during surgery and give to a family member for safe keeping.    Please leave all jewelry, piercing's and valuables at home.   Wear comfortable clothing that is easy to remove and place back on after surgery.     Make arrangements in advance for transportation home by a responsible adult.    You must make arrangements for transportation, TAXI'S, UBER'S OR LYFTS ARE NOT ALLOWED.      If you have any questions about these instructions, call Pre-Op Admit  Nursing at [phone] ,  or call  Pre-Op Day Surgery Unit at [phone]

## 2025-02-04 ENCOUNTER — TELEPHONE (OUTPATIENT)
Dept: FAMILY MEDICINE | Facility: CLINIC | Age: 85
End: 2025-02-04
Payer: MEDICARE

## 2025-02-04 DIAGNOSIS — R73.9 HYPERGLYCEMIA: Primary | ICD-10-CM

## 2025-02-04 LAB
OHS QRS DURATION: 78 MS
OHS QTC CALCULATION: 425 MS

## 2025-02-04 NOTE — TELEPHONE ENCOUNTER
Referral faxed as per patient's request.  Please view routing history in miscellaneous reports of patient's medical record for reference of fax transmission.  Call placed to patient for notification. Patient verbalized understanding.

## 2025-02-04 NOTE — TELEPHONE ENCOUNTER
LOV--11/19/24  FOV--2/24/25        Chacha Keyesnichole [facility] Staff     ----- Message from Chacha sent at 2/4/2025 10:14 AM CST -----  Contact: pt  Type:  Needs Medical Advice    Who Called: pt    Would the patient rather a call back or a response via [facility]?  Call back    Best Call Back Number:  [phone]    Additional Information: please fax referral for endo to Dr LEONA Valiente at [phone]     Please call to advise    Thanks

## 2025-02-06 ENCOUNTER — ANESTHESIA EVENT (OUTPATIENT)
Dept: SURGERY | Facility: HOSPITAL | Age: 85
End: 2025-02-06
Payer: MEDICARE

## 2025-02-06 ENCOUNTER — HOSPITAL ENCOUNTER (OUTPATIENT)
Dept: RADIOLOGY | Facility: HOSPITAL | Age: 85
Discharge: HOME | End: 2025-02-06
Attending: SPECIALIST | Admitting: SPECIALIST
Payer: MEDICARE

## 2025-02-06 ENCOUNTER — HOSPITAL ENCOUNTER (OUTPATIENT)
Facility: HOSPITAL | Age: 85
Discharge: HOME | End: 2025-02-06
Attending: SPECIALIST | Admitting: SPECIALIST
Payer: MEDICARE

## 2025-02-06 ENCOUNTER — ANESTHESIA (OUTPATIENT)
Dept: SURGERY | Facility: HOSPITAL | Age: 85
End: 2025-02-06
Payer: MEDICARE

## 2025-02-06 VITALS
RESPIRATION RATE: 16 BRPM | TEMPERATURE: 98 F | BODY MASS INDEX: 23.8 KG/M2 | HEART RATE: 64 BPM | SYSTOLIC BLOOD PRESSURE: 157 MMHG | DIASTOLIC BLOOD PRESSURE: 74 MMHG | OXYGEN SATURATION: 99 % | HEIGHT: 71 IN | WEIGHT: 170 LBS

## 2025-02-06 DIAGNOSIS — N35.914 ANTERIOR URETHRAL STRICTURE: Primary | ICD-10-CM

## 2025-02-06 DIAGNOSIS — R33.9 URINARY RETENTION: ICD-10-CM

## 2025-02-06 DIAGNOSIS — Z01.818 PREOP TESTING: ICD-10-CM

## 2025-02-06 LAB
POCT GLUCOSE: 104 MG/DL (ref 70–110)
POCT GLUCOSE: 147 MG/DL (ref 70–110)

## 2025-02-06 PROCEDURE — 37000008 HC ANESTHESIA 1ST 15 MINUTES: Performed by: SPECIALIST

## 2025-02-06 PROCEDURE — 37000009 HC ANESTHESIA EA ADD 15 MINS: Performed by: SPECIALIST

## 2025-02-06 PROCEDURE — 36000707: Performed by: SPECIALIST

## 2025-02-06 PROCEDURE — 82962 GLUCOSE BLOOD TEST: CPT | Performed by: SPECIALIST

## 2025-02-06 PROCEDURE — 63600175 PHARM REV CODE 636 W HCPCS: Performed by: NURSE ANESTHETIST, CERTIFIED REGISTERED

## 2025-02-06 PROCEDURE — 71000033 HC RECOVERY, INTIAL HOUR: Performed by: SPECIALIST

## 2025-02-06 PROCEDURE — 71000015 HC POSTOP RECOV 1ST HR: Performed by: SPECIALIST

## 2025-02-06 PROCEDURE — 36000706: Performed by: SPECIALIST

## 2025-02-06 PROCEDURE — 25000003 PHARM REV CODE 250: Performed by: NURSE ANESTHETIST, CERTIFIED REGISTERED

## 2025-02-06 PROCEDURE — 25000003 PHARM REV CODE 250: Performed by: ANESTHESIOLOGY

## 2025-02-06 PROCEDURE — D9220A PRA ANESTHESIA: Mod: CRNA,,, | Performed by: NURSE ANESTHETIST, CERTIFIED REGISTERED

## 2025-02-06 PROCEDURE — 63600175 PHARM REV CODE 636 W HCPCS: Performed by: SPECIALIST

## 2025-02-06 PROCEDURE — D9220A PRA ANESTHESIA: Mod: ANES,,, | Performed by: ANESTHESIOLOGY

## 2025-02-06 PROCEDURE — 63600175 PHARM REV CODE 636 W HCPCS: Performed by: ANESTHESIOLOGY

## 2025-02-06 RX ORDER — FENTANYL CITRATE 50 UG/ML
INJECTION, SOLUTION INTRAMUSCULAR; INTRAVENOUS
Status: DISCONTINUED | OUTPATIENT
Start: 2025-02-06 | End: 2025-02-06

## 2025-02-06 RX ORDER — LIDOCAINE HYDROCHLORIDE 20 MG/ML
INJECTION, SOLUTION EPIDURAL; INFILTRATION; INTRACAUDAL; PERINEURAL
Status: DISCONTINUED | OUTPATIENT
Start: 2025-02-06 | End: 2025-02-06

## 2025-02-06 RX ORDER — FAMOTIDINE 10 MG/ML
INJECTION INTRAVENOUS
Status: DISCONTINUED | OUTPATIENT
Start: 2025-02-06 | End: 2025-02-06

## 2025-02-06 RX ORDER — PHENYLEPHRINE HYDROCHLORIDE 10 MG/ML
INJECTION INTRAVENOUS
Status: DISCONTINUED | OUTPATIENT
Start: 2025-02-06 | End: 2025-02-06

## 2025-02-06 RX ORDER — DIPHENHYDRAMINE HYDROCHLORIDE 50 MG/ML
6.25 INJECTION INTRAMUSCULAR; INTRAVENOUS ONCE AS NEEDED
Status: DISCONTINUED | OUTPATIENT
Start: 2025-02-06 | End: 2025-02-06 | Stop reason: HOSPADM

## 2025-02-06 RX ORDER — SULFAMETHOXAZOLE AND TRIMETHOPRIM 800; 160 MG/1; MG/1
1 TABLET ORAL DAILY
Qty: 7 TABLET | Refills: 0
Start: 2025-02-06

## 2025-02-06 RX ORDER — ONDANSETRON HYDROCHLORIDE 2 MG/ML
4 INJECTION, SOLUTION INTRAVENOUS DAILY PRN
Status: DISCONTINUED | OUTPATIENT
Start: 2025-02-06 | End: 2025-02-06 | Stop reason: HOSPADM

## 2025-02-06 RX ORDER — HYDROCODONE BITARTRATE AND ACETAMINOPHEN 5; 325 MG/1; MG/1
1 TABLET ORAL EVERY 6 HOURS PRN
Qty: 10 TABLET | Refills: 0
Start: 2025-02-06

## 2025-02-06 RX ORDER — SODIUM CHLORIDE, SODIUM LACTATE, POTASSIUM CHLORIDE, CALCIUM CHLORIDE 600; 310; 30; 20 MG/100ML; MG/100ML; MG/100ML; MG/100ML
INJECTION, SOLUTION INTRAVENOUS CONTINUOUS PRN
Status: DISCONTINUED | OUTPATIENT
Start: 2025-02-06 | End: 2025-02-06

## 2025-02-06 RX ORDER — GLUCAGON 1 MG
1 KIT INJECTION
Status: DISCONTINUED | OUTPATIENT
Start: 2025-02-06 | End: 2025-02-06 | Stop reason: HOSPADM

## 2025-02-06 RX ORDER — ACETAMINOPHEN 10 MG/ML
INJECTION, SOLUTION INTRAVENOUS
Status: DISCONTINUED | OUTPATIENT
Start: 2025-02-06 | End: 2025-02-06

## 2025-02-06 RX ORDER — FENTANYL CITRATE 50 UG/ML
25 INJECTION, SOLUTION INTRAMUSCULAR; INTRAVENOUS EVERY 5 MIN PRN
Status: DISCONTINUED | OUTPATIENT
Start: 2025-02-06 | End: 2025-02-06 | Stop reason: HOSPADM

## 2025-02-06 RX ORDER — OXYCODONE HYDROCHLORIDE 5 MG/1
5 TABLET ORAL
Status: DISCONTINUED | OUTPATIENT
Start: 2025-02-06 | End: 2025-02-06 | Stop reason: HOSPADM

## 2025-02-06 RX ORDER — CEFAZOLIN 2 G/1
2 INJECTION, POWDER, FOR SOLUTION INTRAMUSCULAR; INTRAVENOUS ONCE
Status: COMPLETED | OUTPATIENT
Start: 2025-02-06 | End: 2025-02-06

## 2025-02-06 RX ORDER — PROPOFOL 10 MG/ML
VIAL (ML) INTRAVENOUS
Status: DISCONTINUED | OUTPATIENT
Start: 2025-02-06 | End: 2025-02-06

## 2025-02-06 RX ORDER — LIDOCAINE HYDROCHLORIDE 20 MG/ML
JELLY TOPICAL
Status: DISCONTINUED | OUTPATIENT
Start: 2025-02-06 | End: 2025-02-06

## 2025-02-06 RX ORDER — ONDANSETRON HYDROCHLORIDE 2 MG/ML
INJECTION, SOLUTION INTRAVENOUS
Status: DISCONTINUED | OUTPATIENT
Start: 2025-02-06 | End: 2025-02-06

## 2025-02-06 RX ADMIN — LIDOCAINE HYDROCHLORIDE 4 ML: 20 JELLY TOPICAL at 03:02

## 2025-02-06 RX ADMIN — FENTANYL CITRATE 50 MCG: 50 INJECTION, SOLUTION INTRAMUSCULAR; INTRAVENOUS at 03:02

## 2025-02-06 RX ADMIN — ONDANSETRON 4 MG: 2 INJECTION INTRAMUSCULAR; INTRAVENOUS at 03:02

## 2025-02-06 RX ADMIN — PROPOFOL 130 MG: 10 INJECTION, EMULSION INTRAVENOUS at 03:02

## 2025-02-06 RX ADMIN — FAMOTIDINE 20 MG: 10 INJECTION, SOLUTION INTRAVENOUS at 03:02

## 2025-02-06 RX ADMIN — LIDOCAINE HYDROCHLORIDE 50 MG: 20 INJECTION, SOLUTION INTRAVENOUS at 03:02

## 2025-02-06 RX ADMIN — ACETAMINOPHEN 1000 MG: 10 INJECTION, SOLUTION INTRAVENOUS at 03:02

## 2025-02-06 RX ADMIN — FENTANYL CITRATE 25 MCG: 0.05 INJECTION, SOLUTION INTRAMUSCULAR; INTRAVENOUS at 05:02

## 2025-02-06 RX ADMIN — CEFAZOLIN 2 G: 2 INJECTION, POWDER, FOR SOLUTION INTRAMUSCULAR; INTRAVENOUS at 03:02

## 2025-02-06 RX ADMIN — SODIUM CHLORIDE, SODIUM LACTATE, POTASSIUM CHLORIDE, AND CALCIUM CHLORIDE: .6; .31; .03; .02 INJECTION, SOLUTION INTRAVENOUS at 03:02

## 2025-02-06 RX ADMIN — OXYCODONE HYDROCHLORIDE 5 MG: 5 TABLET ORAL at 05:02

## 2025-02-06 RX ADMIN — PHENYLEPHRINE HYDROCHLORIDE 200 MCG: 10 INJECTION INTRAVENOUS at 03:02

## 2025-02-06 NOTE — ANESTHESIA POSTPROCEDURE EVALUATION
Anesthesia Post Evaluation    Patient: Willie Liao    Procedure(s) Performed: Procedure(s) (LRB):  CYSTOSCOPY, WITH OPTICAL INTERNAL URETHROTOMY (N/A)    Final Anesthesia Type: general      Patient location during evaluation: PACU  Patient participation: Yes- Able to Participate  Level of consciousness: awake and alert, oriented and awake  Post-procedure vital signs: reviewed and stable  Pain management: adequate  Airway patency: patent    PONV status at discharge: No PONV  Anesthetic complications: no      Cardiovascular status: blood pressure returned to baseline, hemodynamically stable and stable  Respiratory status: unassisted, spontaneous ventilation and room air  Hydration status: euvolemic  Follow-up not needed.              Vitals Value Taken Time   /74 02/06/25 1700   Temp 36.3 °C (97.4 °F) 02/06/25 1627   Pulse 62 02/06/25 1714   Resp 16 02/06/25 1714   SpO2 99 % 02/06/25 1714   Vitals shown include unfiled device data.      No case tracking events are documented in the log.      Pain/Leonard Score: Pain Rating Prior to Med Admin: 6 (2/6/2025  5:04 PM)  Leonard Score: 10 (2/6/2025  5:00 PM)

## 2025-02-06 NOTE — H&P
[facility]  History & Physical    SUBJECTIVE:     Chief Complaint/Reason for Admission:     History of Present Illness:  Patient is a 84 y.o. male presents with history of BPH  Having had a TURP several weeks ago  Initially had an excellent flow and urinary stream  Most recently his flow has diminished significantly  Require Reddy catheterization  He is here for cystoscopic evaluation to rule out urethral stricture versus recurrent obstruction    PTA Medications   Medication Sig    atorvastatin (LIPITOR) 20 MG tablet TAKE 1 TABLET(20 MG) BY MOUTH EVERY DAY    CYANOCOBALAMIN, VITAMIN B-12, (VITAMIN B-12 ORAL) Take 1 tablet by mouth once daily.    ezetimibe (ZETIA) 10 mg tablet TAKE 1 TABLET(10 MG) BY MOUTH EVERY DAY (Patient taking differently: Take 10 mg by mouth. Pt unsure if taking)    glucosam/chond-msm1/C/angely/bor (GLUCOSAMINE-CHOND-MSM COMPLEX ORAL) Take 1 tablet by mouth once daily.    magnesium oxide (MAG-OX) 400 mg (241.3 mg magnesium) tablet Take 400 mg by mouth once daily.    metFORMIN (GLUCOPHAGE) 500 MG tablet Take 1 tablet (500 mg total) by mouth daily with breakfast.    metoprolol succinate (TOPROL-XL) 25 MG 24 hr tablet Take 1 tablet (25 mg total) by mouth once daily.    omega-3 fatty acids/fish oil (FISH OIL-OMEGA-3 FATTY ACIDS) 300-1,000 mg capsule Take by mouth once daily.    VITAMIN C 500 MG tablet     vitamin D3/vitamin K2, MK4, (K2 PLUS D3 ORAL) Take by mouth.    zinc gluconate 50 mg tablet Take 50 mg by mouth once daily.    nitroGLYCERIN (NITROSTAT) 0.4 MG SL tablet Place 1 tablet (0.4 mg total) under the tongue every 5 (five) minutes as needed for Chest pain.    UNABLE TO FIND Beet root       Review of patient's allergies indicates:   Allergen Reactions    No known drug allergies        Past Medical History:   Diagnosis Date    Basal cell carcinoma     right ear    Carotid stenosis     Diabetes mellitus     pre    Gastritis     GERD (gastroesophageal reflux disease)     H.  pylori infection     Hyperlipidemia     Shingles      Past Surgical History:   Procedure Laterality Date    arthroscope Left     knee arthroscopy, torn meniscus    CATARACT EXTRACTION BILATERAL W/ ANTERIOR VITRECTOMY Bilateral     COLONOSCOPY      Adrien    CORONARY ANGIOGRAPHY N/A 02/07/2022    Procedure: ANGIOGRAM, CORONARY ARTERY;  Surgeon: John Linn MD;  Location: [facility] CATH/EP LAB;  Service: Cardiology;  Laterality: N/A;    ECTROPION REPAIR Bilateral 03/13/2020    Procedure: REPAIR, ECTROPION, EYELID FLAPS - (Skin and/or Deep Tissue ;  Surgeon: Sarah Blackmon MD;  Location: [facility] OR [address] [place] [address];  Service: Ophthalmology;  Laterality: Bilateral;    LEFT HEART CATHETERIZATION Left 02/07/2022    Procedure: CATHETERIZATION, HEART, LEFT;  Surgeon: Jhon Linn MD;  Location: [facility] CATH/EP LAB;  Service: Cardiology;  Laterality: Left;     Family History   Problem Relation Name Age of Onset    Heart disease Father  65        MI    Hyperlipidemia Father      Stroke Father      COPD Mother      Glaucoma Mother      No Known Problems Sister      No Known Problems Brother      No Known Problems Daughter      Diabetes Unknown          uncles  and aunts     Cancer Paternal Uncle          prostate cancer    Melanoma Neg Hx       Social History     Tobacco Use    Smoking status: Former     Current packs/day: 0.25     Types: Cigarettes    Smokeless tobacco: Never    Tobacco comments:     Social smoker when younger    Substance Use Topics    Alcohol use: No    Drug use: No        Review of Systems:    Negative  OBJECTIVE:     Vital Signs (Most Recent):  Temp: 97.6 °F (36.4 °C) (02/06/25 1101)  Pulse: 88 (02/06/25 1101)  Resp: 18 (02/06/25 1101)  BP: (!) 153/83 (02/06/25 1101)  SpO2: 97 % (02/06/25 1101)    Inpatient Medications:  Current Facility-Administered Medications   Medication Dose Route Frequency Provider Last Rate Last Admin    ceFAZolin 2 g  2 g Intravenous Once Bernard Watkins MD        dextrose 50%  injection 12.5 g  12.5 g Intravenous PRN Armani Ryan MD        diphenhydrAMINE injection 6.25 mg  6.25 mg Intravenous Once PRN Armani Ryan MD        fentaNYL 50 mcg/mL injection 25 mcg  25 mcg Intravenous Q5 Min PRN Armani Ryan MD        glucagon (human recombinant) injection 1 mg  1 mg Intramuscular PRN Armani Ryan MD        ondansetron injection 4 mg  4 mg Intravenous Daily PRN Armani Ryan MD        oxyCODONE immediate release tablet 5 mg  5 mg Oral Q3H PRN Armani Ryan MD         Facility-Administered Medications Ordered in Other Encounters   Medication Dose Route Frequency Provider Last Rate Last Admin    tetracaine HCL 0.5% ophthalmic solution 1 drop  1 drop Both Eyes Once Peri Galeana MD              ASSESSMENT/PLAN:       BPH with bladder outlet obstruction  Urinary retention      Here for cystoscopic evaluation to rule out urethral stricture  Versus BPH, and bladder outlet obstruction

## 2025-02-06 NOTE — ANESTHESIA PREPROCEDURE EVALUATION
02/06/2025  Willie Liao is a 84 y.o., male.    Results for orders placed or performed during the hospital encounter of 02/03/25   EKG 12-lead    Collection Time: 02/03/25  2:34 PM   Result Value Ref Range    QRS Duration 78 ms    [facility]HS QTC Calculation 425 ms    Narrative    Test Reason : Z01.818,    Vent. Rate :  89 BPM     Atrial Rate :  89 BPM     P-R Int : 164 ms          QRS Dur :  78 ms      QT Int : 350 ms       P-R-T Axes :  59 -59  36 degrees    QTcB Int : 425 ms    Normal sinus rhythm  Left axis deviation  Abnormal ECG  Confirmed by Brielle Santacruz ([id number]) on 2/4/2025 10:03:41 AM    Referred By:            Confirmed By: Brielle Santacruz             Lab Results   Component Value Date    WBC 11.15 02/03/2025    HGB 15.9 02/03/2025    HCT 46.9 02/03/2025    MCV 92 02/03/2025     02/03/2025     BMP  Lab Results   Component Value Date     02/03/2025    K 4.1 02/03/2025     02/03/2025    CO2 23 02/03/2025    BUN 23 02/03/2025    CREATININE 1.2 02/03/2025    CALCIUM 9.6 02/03/2025    ANIONGAP 10 02/03/2025     (H) 02/03/2025     (H) 11/21/2024     (H) 08/03/2024       Results for orders placed during the hospital encounter of 12/28/23    Echo Saline Bubble? No    Interpretation Summary    Left Ventricle: The left ventricle is normal in size. Mild basal septal thickening. Normal wall motion. There is normal systolic function. Ejection fraction by visual approximation is 60%. Grade I diastolic dysfunction.    Right Ventricle: Normal right ventricular cavity size. Wall thickness is normal. Right ventricle wall motion  is normal. Systolic function is normal.    Aortic Valve: The aortic valve is a trileaflet valve. There is mild aortic valve sclerosis.    Tricuspid Valve: There is mild regurgitation.    IVC/SVC: Normal venous pressure at 3 mmHg.    The  estimated pulmonary artery systolic pressure is 22 mmHg.         Pre-op Assessment    I have reviewed the Patient Summary Reports.     I have reviewed the Nursing Notes. I have reviewed the NPO Status.   I have reviewed the Medications.     Review of Systems  Anesthesia Hx:  No problems with previous Anesthesia             Denies Family Hx of Anesthesia complications.    Denies Personal Hx of Anesthesia complications.                    Social:  Former Smoker, No Alcohol Use       Hematology/Oncology:  Hematology Normal                       --  Cancer in past history:              surgery   Oncology Comments: Basal cell carcinoma     EENT/Dental:  EENT/Dental Normal           Cardiovascular:        CAD          PVD hyperlipidemia   ECG has been reviewed. Nitrostat use  Subclavian artery stenosis, left Patient on beta blockers Beta blocker taken in last 24 hours            Carotoid Artery Disease               Pulmonary:  Pulmonary Normal                       Renal/:  Renal/ Normal                 Hepatic/GI:     GERD   Gilbert's syndrome             Musculoskeletal:  Musculoskeletal Normal                Neurological:  Neurology Normal          Postherpetic neuralgia                            Endocrine:  Diabetes, poorly controlled, type 2           Psych:  Psychiatric Normal                    Physical Exam  General: Well nourished, Cooperative, Alert and Oriented    Airway:  Mallampati: III   Mouth Opening: Normal  TM Distance: > 6 cm  Tongue: Normal  Neck ROM: Normal ROM    Dental:  Intact    Chest/Lungs:  Normal Respiratory Rate, Clear to auscultation    Heart:  Rate: Normal  Rhythm: Regular Rhythm        Anesthesia Plan  Type of Anesthesia, risks & benefits discussed:    Anesthesia Type: Gen Supraglottic Airway  Intra-op Monitoring Plan: Standard ASA Monitors  Post Op Pain Control Plan: multimodal analgesia and IV/PO Opioids PRN  Induction:  IV  Airway Plan: Video, Post-Induction  Informed Consent:  Informed consent signed with the Patient and all parties understand the risks and agree with anesthesia plan.  All questions answered.   ASA Score: 3  Anesthesia Plan Notes:           General LMA   No Versed   No Decadron, Zofran 4mg iv, Pepcid 20mg iv   Ofirmev 1000mg iv  Sugammadex if needed        Ready For Surgery From Anesthesia Perspective.     .

## 2025-02-06 NOTE — DISCHARGE SUMMARY
Patient comes in for cystoscopy with urethrotomy    Procedure is done w no complication    After a brief stay in recovery, patient is discharged in good condition    Meds given:  Lortab bactrim    F/u office:  Next week to remove Reddy

## 2025-02-06 NOTE — OP NOTE
Operative Note         SUMMARY     Surgery Date:  2/6/2025     Assistant:     Indications:  84-year-old male who has had a TURP several weeks ago  Now with decreased urinary flow  Here for cystoscopic evaluation      Pre-op Diagnosis:   Urethral stricture disease  Post-op Diagnosis:  Same    Procedure:  Cystoscopy with visual internal urethrotomy    Anesthesia: General    Description of Procedure:   Patient brought to cystoscopy suite.  Placed in the cystoscopy position.  Patient is prepped and draped.  Anesthesia is given.  We enter the urinary bladder with the 21 fr cystoscope.  Patient has a stricture at the level of the anterior urethra  We dilate from 12-28 English  We then placed the telescope within the prostatic urethra  Once small lip of tissue was noted to be causing obstruction  We resect this area with a hot knife  The urethral opens nicely  The remainder of the prostatic lumen is nice and open  The bladder is within normal limits  Bladder neck is normal  Urinary flow appears to be brisk  A 20 English Reddy catheter was placed and he goes to recovery in good condition    Findings/Key Components:      Status post urethrotomy    Estimated Blood Loss:    Minimal

## 2025-02-10 ENCOUNTER — PATIENT OUTREACH (OUTPATIENT)
Dept: ADMINISTRATIVE | Facility: HOSPITAL | Age: 85
End: 2025-02-10
Payer: MEDICARE

## 2025-02-11 ENCOUNTER — PATIENT OUTREACH (OUTPATIENT)
Dept: DIABETES | Facility: CLINIC | Age: 85
End: 2025-02-11
Payer: MEDICARE

## 2025-02-11 NOTE — PATIENT INSTRUCTIONS
Patient's wife, Genie, called asking for names of Endocrinologist for  to see.  Provided names of PA and NP s in [place] and [place] area.  They would prefer to stay in [place] if possible.  Sent Endo LPN (Carolyn) message asking her to get in touch with pt and/or wife, to see about scheduling appt.  Pt appreciates info.

## 2025-02-18 ENCOUNTER — TELEPHONE (OUTPATIENT)
Dept: DERMATOLOGY | Facility: CLINIC | Age: 85
End: 2025-02-18
Payer: MEDICARE

## 2025-02-18 NOTE — TELEPHONE ENCOUNTER
----- Message from [employer] sent at 2/18/2025  1:01 PM CST -----  Type:  Needs Medical AdviceWho Called: ptWould the patient rather a call back or a response via [facility]? Call Yvetteest Call Back Number: [phone] Additional Information: pt wife yoshi st pt has rash on groin area and she is very concern. It itches and irritating him and would like an sooner appt. please call to discuss

## 2025-02-20 ENCOUNTER — LAB VISIT (OUTPATIENT)
Dept: LAB | Facility: HOSPITAL | Age: 85
End: 2025-02-20
Attending: FAMILY MEDICINE
Payer: MEDICARE

## 2025-02-20 DIAGNOSIS — E11.65 TYPE 2 DIABETES MELLITUS WITH HYPERGLYCEMIA, WITHOUT LONG-TERM CURRENT USE OF INSULIN: ICD-10-CM

## 2025-02-20 LAB
ALBUMIN SERPL BCP-MCNC: 3.7 G/DL (ref 3.5–5.2)
ALP SERPL-CCNC: 84 U/L (ref 40–150)
ALT SERPL W/O P-5'-P-CCNC: 23 U/L (ref 10–44)
ANION GAP SERPL CALC-SCNC: 6 MMOL/L (ref 8–16)
AST SERPL-CCNC: 34 U/L (ref 10–40)
BILIRUB SERPL-MCNC: 1.3 MG/DL (ref 0.1–1)
BUN SERPL-MCNC: 16 MG/DL (ref 8–23)
CALCIUM SERPL-MCNC: 9.3 MG/DL (ref 8.7–10.5)
CHLORIDE SERPL-SCNC: 107 MMOL/L (ref 95–110)
CO2 SERPL-SCNC: 26 MMOL/L (ref 23–29)
CREAT SERPL-MCNC: 1.1 MG/DL (ref 0.5–1.4)
EST. GFR  (NO RACE VARIABLE): >60 ML/MIN/1.73 M^2
ESTIMATED AVG GLUCOSE: 123 MG/DL (ref 68–131)
GLUCOSE SERPL-MCNC: 111 MG/DL (ref 70–110)
HBA1C MFR BLD: 5.9 % (ref 4–5.6)
POTASSIUM SERPL-SCNC: 4.4 MMOL/L (ref 3.5–5.1)
PROT SERPL-MCNC: 6.9 G/DL (ref 6–8.4)
SODIUM SERPL-SCNC: 139 MMOL/L (ref 136–145)

## 2025-02-20 PROCEDURE — 83036 HEMOGLOBIN GLYCOSYLATED A1C: CPT | Mod: HCNC | Performed by: FAMILY MEDICINE

## 2025-02-20 PROCEDURE — 36415 COLL VENOUS BLD VENIPUNCTURE: CPT | Mod: HCNC,PO | Performed by: FAMILY MEDICINE

## 2025-02-20 PROCEDURE — 80053 COMPREHEN METABOLIC PANEL: CPT | Mod: HCNC | Performed by: FAMILY MEDICINE

## 2025-02-21 ENCOUNTER — RESULTS FOLLOW-UP (OUTPATIENT)
Dept: FAMILY MEDICINE | Facility: CLINIC | Age: 85
End: 2025-02-21
Payer: MEDICARE

## 2025-02-24 ENCOUNTER — OFFICE VISIT (OUTPATIENT)
Dept: FAMILY MEDICINE | Facility: CLINIC | Age: 85
End: 2025-02-24
Payer: MEDICARE

## 2025-02-24 VITALS
HEART RATE: 59 BPM | TEMPERATURE: 99 F | OXYGEN SATURATION: 96 % | DIASTOLIC BLOOD PRESSURE: 68 MMHG | SYSTOLIC BLOOD PRESSURE: 120 MMHG | RESPIRATION RATE: 16 BRPM | WEIGHT: 168 LBS | HEIGHT: 71 IN | BODY MASS INDEX: 23.52 KG/M2

## 2025-02-24 DIAGNOSIS — Z00.00 ENCOUNTER FOR MEDICARE ANNUAL WELLNESS EXAM: ICD-10-CM

## 2025-02-24 DIAGNOSIS — E78.2 HYPERLIPIDEMIA, MIXED: ICD-10-CM

## 2025-02-24 DIAGNOSIS — I65.29 STENOSIS OF CAROTID ARTERY, UNSPECIFIED LATERALITY: ICD-10-CM

## 2025-02-24 DIAGNOSIS — E11.9 TYPE 2 DIABETES MELLITUS WITHOUT COMPLICATION, WITHOUT LONG-TERM CURRENT USE OF INSULIN: Primary | ICD-10-CM

## 2025-02-24 PROCEDURE — 3078F DIAST BP <80 MM HG: CPT | Mod: HCNC,CPTII,S$GLB, | Performed by: FAMILY MEDICINE

## 2025-02-24 PROCEDURE — 1101F PT FALLS ASSESS-DOCD LE1/YR: CPT | Mod: HCNC,CPTII,S$GLB, | Performed by: FAMILY MEDICINE

## 2025-02-24 PROCEDURE — 99214 OFFICE O/P EST MOD 30 MIN: CPT | Mod: HCNC,S$GLB,, | Performed by: FAMILY MEDICINE

## 2025-02-24 PROCEDURE — 1126F AMNT PAIN NOTED NONE PRSNT: CPT | Mod: HCNC,CPTII,S$GLB, | Performed by: FAMILY MEDICINE

## 2025-02-24 PROCEDURE — 3074F SYST BP LT 130 MM HG: CPT | Mod: HCNC,CPTII,S$GLB, | Performed by: FAMILY MEDICINE

## 2025-02-24 PROCEDURE — G2211 COMPLEX E/M VISIT ADD ON: HCPCS | Mod: HCNC,S$GLB,, | Performed by: FAMILY MEDICINE

## 2025-02-24 PROCEDURE — 99999 PR PBB SHADOW E&M-EST. PATIENT-LVL III: CPT | Mod: PBBFAC,HCNC,, | Performed by: FAMILY MEDICINE

## 2025-02-24 PROCEDURE — 3288F FALL RISK ASSESSMENT DOCD: CPT | Mod: HCNC,CPTII,S$GLB, | Performed by: FAMILY MEDICINE

## 2025-02-24 NOTE — PROGRESS NOTES
Subjective:   Patient ID: Willie Liao is a 84 y.o. male     Chief Complaint:Follow-up      Here for checkup    Follow-up  Pertinent negatives include no abdominal pain or chest pain.     Review of Systems   Respiratory:  Negative for shortness of breath.    Cardiovascular:  Negative for chest pain.   Gastrointestinal:  Negative for abdominal pain.   Genitourinary:  Negative for dysuria.     Past Medical History:   Diagnosis Date    Basal cell carcinoma     right ear    Carotid stenosis     Diabetes mellitus     pre    Gastritis     GERD (gastroesophageal reflux disease)     H. pylori infection     Hyperlipidemia     Shingles      Past Surgical History:   Procedure Laterality Date    arthroscope Left     knee arthroscopy, torn meniscus    CATARACT EXTRACTION BILATERAL W/ ANTERIOR VITRECTOMY Bilateral     COLONOSCOPY      Adrien    CORONARY ANGIOGRAPHY N/A 02/07/2022    Procedure: ANGIOGRAM, CORONARY ARTERY;  Surgeon: John Linn MD;  Location: [facility] CATH/EP LAB;  Service: Cardiology;  Laterality: N/A;    CYSTOSCOPY WITH OPTICAL INTERNAL URETHROTOMY N/A 2/6/2025    Procedure: CYSTOSCOPY, WITH OPTICAL INTERNAL URETHROTOMY;  Surgeon: Bernard Watkins MD;  Location: [facility] OR;  Service: Urology;  Laterality: N/A;    ECTROPION REPAIR Bilateral 03/13/2020    Procedure: REPAIR, ECTROPION, EYELID FLAPS - (Skin and/or Deep Tissue ;  Surgeon: Sarah Blackmon MD;  Location: [address];  Service: Ophthalmology;  Laterality: Bilateral;    LEFT HEART CATHETERIZATION Left 02/07/2022    Procedure: CATHETERIZATION, HEART, LEFT;  Surgeon: John Linn MD;  Location: [facility] CATH/EP LAB;  Service: Cardiology;  Laterality: Left;     Objective:     Vitals:    02/24/25 1352   BP: 120/68   Pulse: (!) 59   Resp: 16   Temp: 98.6 °F (37 °C)     Body mass index is 23.43 kg/m².  Physical Exam  Vitals and nursing note reviewed.   Constitutional:       Appearance: He is well-developed.   HENT:      Head: Normocephalic  and atraumatic.   Eyes:      General: No scleral icterus.     Conjunctiva/sclera: Conjunctivae normal.   Cardiovascular:      Heart sounds: No murmur heard.  Pulmonary:      Effort: Pulmonary effort is normal. No respiratory distress.   Musculoskeletal:         General: No deformity. Normal range of motion.      Cervical back: Normal range of motion and neck supple.   Skin:     Coloration: Skin is not pale.      Findings: No rash.   Neurological:      Mental Status: He is alert and oriented to person, place, and time.   Psychiatric:         Behavior: Behavior normal.         Thought Content: Thought content normal.         Judgment: Judgment normal.       Assessment:     1. Type 2 diabetes mellitus without complication, without long-term current use of insulin    2. Hyperlipidemia, mixed    3. Stenosis of carotid artery, unspecified laterality      Plan:   Type 2 diabetes mellitus without complication, without long-term current use of insulin  -     Hemoglobin A1C; Future; Expected date: 06/06/2025  -     Comprehensive Metabolic Panel; Future; Expected date: 06/06/2025  Counseled. With healthy diet able lower blood sugar considerably. Will d/c metformin.  Hyperlipidemia, mixed  -     Comprehensive Metabolic Panel; Future; Expected date: 06/06/2025    Stenosis of carotid artery, unspecified laterality  -     Comprehensive Metabolic Panel; Future; Expected date: 06/06/2025        Total time spent of Greater than 30 minutes minutes on the day of the visit.This includes face to face time and preparing to see the patient, obtaining and reviewing separately obtained history, documenting clinical information in the electronic or other health record, independently interpreting results and communicating results to the patient/family/caregiver, or care coordinator.    Established patient with me has been instructed that must see me at least 1 time yearly (every 365 days) for refills of medications. Seeing other providers in  this clinic is fine but expectation is to see me yearly.    Cooper Luz MD  02/24/2025    Portions of this note have been dictated with NATHEN Garcia.

## 2025-04-01 ENCOUNTER — OFFICE VISIT (OUTPATIENT)
Dept: FAMILY MEDICINE | Facility: CLINIC | Age: 85
End: 2025-04-01
Payer: MEDICARE

## 2025-04-01 VITALS
HEART RATE: 75 BPM | HEIGHT: 71 IN | BODY MASS INDEX: 23.36 KG/M2 | DIASTOLIC BLOOD PRESSURE: 70 MMHG | SYSTOLIC BLOOD PRESSURE: 132 MMHG | TEMPERATURE: 98 F | WEIGHT: 166.88 LBS | OXYGEN SATURATION: 99 %

## 2025-04-01 DIAGNOSIS — E78.5 HYPERLIPIDEMIA ASSOCIATED WITH TYPE 2 DIABETES MELLITUS: ICD-10-CM

## 2025-04-01 DIAGNOSIS — I70.0 AORTIC ATHEROSCLEROSIS: ICD-10-CM

## 2025-04-01 DIAGNOSIS — E11.69 HYPERLIPIDEMIA ASSOCIATED WITH TYPE 2 DIABETES MELLITUS: ICD-10-CM

## 2025-04-01 DIAGNOSIS — Z00.00 ENCOUNTER FOR MEDICARE ANNUAL WELLNESS EXAM: Primary | ICD-10-CM

## 2025-04-01 DIAGNOSIS — E11.9 TYPE 2 DIABETES MELLITUS WITHOUT COMPLICATION, WITHOUT LONG-TERM CURRENT USE OF INSULIN: ICD-10-CM

## 2025-04-01 PROCEDURE — 99999 PR PBB SHADOW E&M-EST. PATIENT-LVL V: CPT | Mod: PBBFAC,HCNC,, | Performed by: NURSE PRACTITIONER

## 2025-04-01 RX ORDER — NEOMYCIN SULFATE, POLYMYXIN B SULFATE, AND DEXAMETHASONE 3.5; 10000; 1 MG/G; [USP'U]/G; MG/G
OINTMENT OPHTHALMIC
COMMUNITY
Start: 2025-03-25

## 2025-04-01 RX ORDER — PREDNISOLONE ACETATE 10 MG/ML
1 SUSPENSION/ DROPS OPHTHALMIC 4 TIMES DAILY
COMMUNITY
Start: 2025-02-28

## 2025-04-01 NOTE — PROGRESS NOTES
"  Willie Liao presented for a  Medicare AWV and comprehensive Health Risk Assessment today. The following components were reviewed and updated:    Medical history  Family History  Social history  Allergies and Current Medications  Health Risk Assessment  Health Maintenance  Care Team         ** See Completed Assessments for Annual Wellness Visit within the encounter summary.**         The following assessments were completed:  Living Situation  CAGE  Depression Screening  Timed Get Up and Go  Whisper Test  Cognitive Function Screening  Nutrition Screening  ADL Screening  PAQ Screening    Clock in media   Opioid documentation:      Patient does not have a current opioid prescription.        Vitals:    04/01/25 0920   BP: 132/70   Pulse: 75   Temp: 97.7 °F (36.5 °C)   TempSrc: Oral   SpO2: 99%   Weight: 75.7 kg (166 lb 14.2 oz)   Height: 5' 11" (1.803 m)     Body mass index is 23.28 kg/m².  Physical Exam  Constitutional:       Appearance: He is well-developed.   HENT:      Head: Normocephalic and atraumatic.      Right Ear: Hearing normal.      Left Ear: Hearing normal.      Nose: Nose normal.   Eyes:      General: Lids are normal.      Conjunctiva/sclera: Conjunctivae normal.      Pupils: Pupils are equal, round, and reactive to light.   Cardiovascular:      Rate and Rhythm: Normal rate.   Pulmonary:      Effort: Pulmonary effort is normal.   Abdominal:      Palpations: Abdomen is soft.   Musculoskeletal:         General: Normal range of motion.      Cervical back: Normal range of motion and neck supple.   Skin:     General: Skin is warm and dry.   Neurological:      Mental Status: He is alert and oriented to person, place, and time.               Diagnoses and health risks identified today and associated recommendations/orders:    1. Encounter for Medicare annual wellness exam  Discussed health maintenance guidelines appropriate for age.      - Referral to Enhanced Annual Wellness Visit (eAWV) W+1    2. Type 2 " diabetes mellitus without complication, without long-term current use of insulin  Controlled, continue current medication regimen  Last HgA1c - 5.9  ADA diet  Increase physical activity   Followed by pcp      3. Hyperlipidemia associated with type 2 diabetes mellitus  Controlled, continue current medication regimen  Patient taking statin  Followed by pcp      4. Aortic atherosclerosis  Stable, continue to monitor  Followed by pcp       Provided Willie with a 5-10 year written screening schedule and personal prevention plan. Recommendations were developed using the USPSTF age appropriate recommendations. Education, counseling, and referrals were provided as needed. After Visit Summary printed and given to patient which includes a list of additional screenings\tests needed.    Follow up for One year for Annual Wellness Visit.    Benita Lawson, NP      I offered to discuss advanced care planning, including how to pick a person who would make decisions for you if you were unable to make them for yourself, called a health care power of , and what kind of decisions you might make such as use of life sustaining treatments such as ventilators and tube feeding when faced with a life limiting illness recorded on a living will that they will need to know. (How you want to be cared for as you near the end of your natural life)     X  Patient has advanced directive written but has opted not to place them on file with the institution.

## 2025-04-01 NOTE — PATIENT INSTRUCTIONS
Counseling and Referral of Other Preventative  (Italic type indicates deductible and co-insurance are waived)    Patient Name: Willie Liao  Today's Date: 4/1/2025    Health Maintenance       Date Due Completion Date    Aspirin/Antiplatelet Therapy Never done ---    Shingles Vaccine (1 of 2) Never done ---    RSV Vaccine (Age 60+ and Pregnant patients) (1 - 1-dose 75+ series) Never done ---    TETANUS VACCINE 09/07/2021 9/7/2011    Lipid Panel 11/21/2025 11/21/2024        No orders of the defined types were placed in this encounter.      The following information is provided to all patients.  This information is to help you find resources for any of the problems found today that may be affecting your health:                  Living healthy guide: www.[facility].[place].gov      Understanding Diabetes: www.diabetes.org      Eating healthy: www.cdc.gov/healthyweight      [facility] home safety checklist: www.cdc.gov/steadi/patient.html      Agency on Aging: www.goea.[place].[facility]      Alcoholics anonymous (AA): www.aa.org      Physical Activity: www.jannette.nih.gov/mj1fvpc      Tobacco use: www.quitwithusla.org

## 2025-04-13 DIAGNOSIS — E78.2 HYPERLIPIDEMIA, MIXED: ICD-10-CM

## 2025-04-13 RX ORDER — ATORVASTATIN CALCIUM 20 MG/1
20 TABLET, FILM COATED ORAL
Qty: 90 TABLET | Refills: 2 | Status: SHIPPED | OUTPATIENT
Start: 2025-04-13

## 2025-04-13 NOTE — TELEPHONE ENCOUNTER
No care due was identified.  Health [facility] Embedded Care Due Messages. Reference number: [id number].   4/13/2025 11:01:24 AM CDT

## 2025-04-21 ENCOUNTER — TELEPHONE (OUTPATIENT)
Dept: FAMILY MEDICINE | Facility: CLINIC | Age: 85
End: 2025-04-21
Payer: MEDICARE

## 2025-04-21 DIAGNOSIS — E11.9 TYPE 2 DIABETES MELLITUS WITHOUT COMPLICATION, WITHOUT LONG-TERM CURRENT USE OF INSULIN: ICD-10-CM

## 2025-04-21 DIAGNOSIS — E78.5 HYPERLIPIDEMIA ASSOCIATED WITH TYPE 2 DIABETES MELLITUS: Primary | ICD-10-CM

## 2025-04-21 DIAGNOSIS — K21.9 GASTROESOPHAGEAL REFLUX DISEASE WITHOUT ESOPHAGITIS: ICD-10-CM

## 2025-04-21 DIAGNOSIS — D75.89 MACROCYTOSIS: ICD-10-CM

## 2025-04-21 DIAGNOSIS — E11.69 HYPERLIPIDEMIA ASSOCIATED WITH TYPE 2 DIABETES MELLITUS: Primary | ICD-10-CM

## 2025-04-21 NOTE — TELEPHONE ENCOUNTER
Lipid panel ordered. I also included vitamin B1 and Zinc. I can order the vitamin D level, but it may not be approved by insurance.

## 2025-04-21 NOTE — TELEPHONE ENCOUNTER
I spoke with pt via phone. Pt states that he called and he does not want vit d added to the lab as it is to expensive. No further questions. Lab not ordered or link to appt.       ----- Message from Octavia sent at 4/21/2025  3:55 PM CDT -----  Type:  Needs Medical AdviceWho Called: Miesha name and phone #:  [id number] - [place], [place] - [address] [facility] [id number] [address]Nivmw: [phone] Fax: [phone] the patient rather a call back or a response via [facility]? Please callBe Call Back Number: [phone] Information: pt does not want vit D, too expensive   Please call back to advise. Thanks!

## 2025-04-21 NOTE — TELEPHONE ENCOUNTER
Patient is taking Atorvastatin (ordered by Dr. Luz) & Zetia (ordered by Dr. Linn) for cholesterol.  Patient is wondering if he needs to be on both medications.  Requesting to add cholesterol level to upcoming lab visit on 5/22/25.  Patient also would like labs ordered to see if he is deficient in any vitamins/minerals such as Vitamin D, Vitamin B, or Zinc.  Please advise. Thank you.     Patient has existing FOV with SAGAR Hernandez on 5/29/25.

## 2025-04-21 NOTE — TELEPHONE ENCOUNTER
Gisselle Vanegas Cooper Staff     ----- Message from Gisselle sent at 4/21/2025 10:03 AM CDT -----  Name of Who is Calling:MIRNA JEREZ [[id number]]      What is the request in detail:Pt requesting a call back from the office regarding his medication mg.  Can the clinic reply by [facility]:Call    What Number to Call Back if not in [facility]:Telephone Information:Mobile [phone]

## 2025-04-21 NOTE — TELEPHONE ENCOUNTER
Reviewed attached message from SAGAR Hernandez with patient who verbally indicated understanding.  Patient states he will contact [facility] Billing Department to see what the cost of Vitamin D level without insurance and will call back if he decides to include this with other already scheduled labs.  Writer confirmed Zinc, Vitamin B1, and Cholesterol levels have been linked to lab appointment.

## 2025-04-22 ENCOUNTER — OFFICE VISIT (OUTPATIENT)
Dept: DERMATOLOGY | Facility: CLINIC | Age: 85
End: 2025-04-22
Payer: MEDICARE

## 2025-04-22 DIAGNOSIS — Z85.828 ENCOUNTER FOR FOLLOW-UP SURVEILLANCE OF SKIN CANCER: ICD-10-CM

## 2025-04-22 DIAGNOSIS — L57.8 ACTINIC SKIN DAMAGE: ICD-10-CM

## 2025-04-22 DIAGNOSIS — L82.1 SEBORRHEIC KERATOSES: ICD-10-CM

## 2025-04-22 DIAGNOSIS — Z08 ENCOUNTER FOR FOLLOW-UP SURVEILLANCE OF SKIN CANCER: ICD-10-CM

## 2025-04-22 DIAGNOSIS — D18.01 CHERRY ANGIOMA: ICD-10-CM

## 2025-04-22 DIAGNOSIS — D22.9 MULTIPLE BENIGN NEVI: ICD-10-CM

## 2025-04-22 DIAGNOSIS — L81.4 SOLAR LENTIGO: ICD-10-CM

## 2025-04-22 DIAGNOSIS — L57.0 ACTINIC KERATOSES: Primary | ICD-10-CM

## 2025-04-22 NOTE — PROGRESS NOTES
Subjective:      Patient ID:  Willie Liao is a 84 y.o. male who presents for   Chief Complaint   Patient presents with    Skin Check     UBSC     LOV 10/22/2024 - AK, SK, multi benign nevi, angioma, lentigo    Patient is coming in for UBSC.  C/O spot on right arm  C/O spot on left arm    Derm Hx:  BCC - right helix rim, neg bx margins,  monitoring 2024  Ak's treated with cryo   2019 AK R helix   2019 AK R dorsal wrist   Has no fhx of MM       Current Outpatient Medications:   ·  atorvastatin (LIPITOR) 20 MG tablet, TAKE 1 TABLET(20 MG) BY MOUTH EVERY DAY, Disp: 90 tablet, Rfl: 2  ·  CYANOCOBALAMIN, VITAMIN B-12, (VITAMIN B-12 ORAL), Take 1 tablet by mouth once daily., Disp: , Rfl:   ·  ezetimibe (ZETIA) 10 mg tablet, TAKE 1 TABLET(10 MG) BY MOUTH EVERY DAY, Disp: 90 tablet, Rfl: 3  ·  glucosam/chond-msm1/C/angely/bor (GLUCOSAMINE-CHOND-MSM COMPLEX ORAL), Take 1 tablet by mouth once daily., Disp: , Rfl:   ·  magnesium oxide (MAG-OX) 400 mg (241.3 mg magnesium) tablet, Take 400 mg by mouth once daily., Disp: , Rfl:   ·  metoprolol succinate (TOPROL-XL) 25 MG 24 hr tablet, Take 1 tablet (25 mg total) by mouth once daily., Disp: 90 tablet, Rfl: 11  ·  neomycin-polymyxin-dexamethasone (DEXACINE) 3.5 mg/g-10,000 unit/g-0.1 % Oint, SMARTSI.25 Inch(es) Left Eye Twice Daily, Disp: , Rfl:   ·  omega-3 fatty acids/fish oil (FISH OIL-OMEGA-3 FATTY ACIDS) 300-1,000 mg capsule, Take by mouth once daily., Disp: , Rfl:   ·  prednisoLONE acetate (PRED FORTE) 1 % DrpS, Place 1 drop into the left eye 4 (four) times daily., Disp: , Rfl:   ·  UNABLE TO FIND, Beet root, Disp: , Rfl:   ·  VITAMIN C 500 MG tablet, , Disp: , Rfl:   ·  vitamin D3/vitamin K2, MK4, (K2 PLUS D3 ORAL), Take by mouth., Disp: , Rfl:   ·  zinc gluconate 50 mg tablet, Take 50 mg by mouth once daily., Disp: , Rfl:   ·  nitroGLYCERIN (NITROSTAT) 0.4 MG SL tablet, Place 1 tablet (0.4 mg total) under the tongue every 5 (five) minutes as needed for  Chest pain., Disp: 25 tablet, Rfl: 6  No current facility-administered medications for this visit.    Facility-Administered Medications Ordered in Other Visits:   ·  tetracaine HCL 0.5% ophthalmic solution 1 drop, 1 drop, Both Eyes, Once, Peri Galeana MD       Review of Systems   Constitutional:  Negative for fever, chills and fatigue.   Respiratory:  Negative for cough and shortness of breath.    Skin:  Positive for activity-related sunscreen use and wears hat. Negative for rash and daily sunscreen use.   Hematologic/Lymphatic: Does not bruise/bleed easily.       Objective:   Physical Exam   Constitutional: He appears well-developed and well-nourished. No distress.   Neurological: He is alert and oriented to person, place, and time. He is not disoriented.   Psychiatric: He has a normal mood and affect.   Skin:   Areas Examined (abnormalities noted in diagram):   Scalp / Hair Palpated and Inspected  Head / Face Inspection Performed  Neck Inspection Performed  Chest / Axilla Inspection Performed  Abdomen Inspection Performed  Back Inspection Performed  RUE Inspected  LUE Inspection Performed  Nails and Digits Inspection Performed                     Diagram Legend     Erythematous scaling macule/papule c/w actinic keratosis       Vascular papule c/w angioma      Pigmented verrucoid papule/plaque c/w seborrheic keratosis      Yellow umbilicated papule c/w sebaceous hyperplasia      Irregularly shaped tan macule c/w lentigo     1-2 mm smooth white papules consistent with Milia      Movable subcutaneous cyst with punctum c/w epidermal inclusion cyst      Subcutaneous movable cyst c/w pilar cyst      Firm pink to brown papule c/w dermatofibroma      Pedunculated fleshy papule(s) c/w skin tag(s)      Evenly pigmented macule c/w junctional nevus     Mildly variegated pigmented, slightly irregular-bordered macule c/w mildly atypical nevus      Flesh colored to evenly pigmented papule c/w intradermal nevus       Pink  pearly papule/plaque c/w basal cell carcinoma      Erythematous hyperkeratotic cursted plaque c/w SCC      Surgical scar with no sign of skin cancer recurrence      Open and closed comedones      Inflammatory papules and pustules      Verrucoid papule consistent consistent with wart     Erythematous eczematous patches and plaques     Dystrophic onycholytic nail with subungual debris c/w onychomycosis     Umbilicated papule    Erythematous-base heme-crusted tan verrucoid plaque consistent with inflamed seborrheic keratosis     Erythematous Silvery Scaling Plaque c/w Psoriasis     See annotation      Assessment / Plan:        Actinic keratoses  Cryosurgery Procedure Note    Verbal consent from the patient is obtained and the patient is aware of the precancerous quality and need for treatment of these lesions. Liquid nitrogen cryosurgery is applied to the 10 actinic keratoses, as detailed in the physical exam, to produce a freeze injury. The patient is aware that blisters may form and is instructed on wound care with gentle cleansing and use of vaseline ointment to keep moist until healed. The patient is supplied a handout on cryosurgery and is instructed to call if lesions do not completely resolve.    Encounter for follow-up surveillance of skin cancer  Area of previous NMSC (R ear) examined. Site well healed with no signs of recurrence.  Upper body skin examination performed today including at least 9 points as noted in physical examination. No lesions suspicious for malignancy noted.    Seborrheic keratoses  These are benign inherited growths without a malignant potential. Reassurance given to patient. No treatment is necessary.     Multiple benign nevi  .Careful dermoscopy evaluation of nevi performed with none identified as needing biopsy today  Monitor for new mole or moles that are becoming bigger, darker, irritated, or developing irregular borders.     Cherry angioma  This is a benign vascular lesion.  Reassurance given. No treatment required.     Solar lentigo  This is a benign hyperpigmented sun induced lesion. Daily sun protection will reduce the number of new lesions. Treatment of these benign lesions are considered cosmetic.    Actinic skin damage  Patient instructed in importance in daily broad spectrum sun protection of at least spf 30. Mineral sunscreen ingredients preferred (Zinc +/- Titanium) and can be found OTC.   Patient encouraged to wear hat for all outdoor exposure.   Also discussed sun avoidance and use of protective clothing.             No follow-ups on file.

## 2025-05-01 ENCOUNTER — HOSPITAL ENCOUNTER (OUTPATIENT)
Dept: RADIOLOGY | Facility: HOSPITAL | Age: 85
Discharge: HOME | End: 2025-05-01
Attending: ORTHOPAEDIC SURGERY
Payer: MEDICARE

## 2025-05-01 ENCOUNTER — OFFICE VISIT (OUTPATIENT)
Dept: ORTHOPEDICS | Facility: CLINIC | Age: 85
End: 2025-05-01
Payer: MEDICARE

## 2025-05-01 ENCOUNTER — HOSPITAL ENCOUNTER (EMERGENCY)
Facility: HOSPITAL | Age: 85
Discharge: HOME | End: 2025-05-01
Attending: STUDENT IN AN ORGANIZED HEALTH CARE EDUCATION/TRAINING PROGRAM
Payer: MEDICARE

## 2025-05-01 VITALS
WEIGHT: 175 LBS | HEIGHT: 71 IN | HEART RATE: 89 BPM | SYSTOLIC BLOOD PRESSURE: 145 MMHG | RESPIRATION RATE: 20 BRPM | TEMPERATURE: 98 F | BODY MASS INDEX: 24.5 KG/M2 | OXYGEN SATURATION: 94 % | DIASTOLIC BLOOD PRESSURE: 71 MMHG

## 2025-05-01 VITALS
BODY MASS INDEX: 24.5 KG/M2 | SYSTOLIC BLOOD PRESSURE: 144 MMHG | DIASTOLIC BLOOD PRESSURE: 80 MMHG | WEIGHT: 175 LBS | HEIGHT: 71 IN

## 2025-05-01 DIAGNOSIS — M17.12 PRIMARY OSTEOARTHRITIS OF LEFT KNEE: Primary | ICD-10-CM

## 2025-05-01 DIAGNOSIS — R07.9 CHEST PAIN: ICD-10-CM

## 2025-05-01 DIAGNOSIS — K56.699 FOOD BOLUS OBSTRUCTION OF INTESTINE: Primary | ICD-10-CM
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
## 2025-05-01 DIAGNOSIS — M17.11 PRIMARY OSTEOARTHRITIS OF RIGHT KNEE: ICD-10-CM

## 2025-05-01 LAB
ABSOLUTE EOSINOPHIL (SMH): 0 K/UL
ABSOLUTE MONOCYTE (SMH): 0.03 K/UL (ref 0.3–1)
ABSOLUTE NEUTROPHIL COUNT (SMH): 7.5 K/UL (ref 1.8–7.7)
ALBUMIN SERPL-MCNC: 4.3 G/DL (ref 3.5–5.2)
ALLENS TEST: NORMAL
ALP SERPL-CCNC: 112 UNIT/L (ref 40–150)
ALT SERPL-CCNC: 32 UNIT/L (ref 10–44)
ANION GAP (SMH): 13 MMOL/L (ref 8–16)
AST SERPL-CCNC: 32 UNIT/L (ref 11–45)
B-OH-BUTYR BLD STRIP-SCNC: 0.3 MMOL/L
BASOPHILS # BLD AUTO: 0 K/UL
BASOPHILS NFR BLD AUTO: 0 %
BILIRUB SERPL-MCNC: 1.2 MG/DL (ref 0.1–1)
BUN SERPL-MCNC: 34 MG/DL (ref 8–23)
CALCIUM SERPL-MCNC: 9.8 MG/DL (ref 8.7–10.5)
CHLORIDE SERPL-SCNC: 109 MMOL/L (ref 95–110)
CO2 SERPL-SCNC: 18 MMOL/L (ref 23–29)
CREAT SERPL-MCNC: 1 MG/DL (ref 0.5–1.4)
DELSYS: NORMAL
ERYTHROCYTE [DISTWIDTH] IN BLOOD BY AUTOMATED COUNT: 12.3 % (ref 11.5–14.5)
ERYTHROCYTE [SEDIMENTATION RATE] IN BLOOD BY WESTERGREN METHOD: 18 MM/H
FIO2: 21
GFR SERPLBLD CREATININE-BSD FMLA CKD-EPI: >60 ML/MIN/1.73/M2
GLUCOSE SERPL-MCNC: 269 MG/DL (ref 70–110)
HCT VFR BLD AUTO: 45.8 % (ref 40–54)
HCV AB SERPL QL IA: NORMAL
HGB BLD-MCNC: 16 GM/DL (ref 14–18)
HIV 1+2 AB+HIV1 P24 AG SERPL QL IA: NORMAL
IMM GRANULOCYTES # BLD AUTO: 0.03 K/UL (ref 0–0.04)
IMM GRANULOCYTES NFR BLD AUTO: 0.4 % (ref 0–0.5)
LYMPHOCYTES # BLD AUTO: 0.73 K/UL (ref 1–4.8)
MAGNESIUM SERPL-MCNC: 2 MG/DL (ref 1.6–2.6)
MCH RBC QN AUTO: 32.2 PG (ref 27–31)
MCHC RBC AUTO-ENTMCNC: 34.9 G/DL (ref 32–36)
MCV RBC AUTO: 92 FL (ref 82–98)
MODE: NORMAL
NUCLEATED RBC (/100WBC) (SMH): 0 /100 WBC
PH SMN: 7.39 [PH] (ref 7.35–7.45)
PLATELET # BLD AUTO: 224 K/UL (ref 150–450)
PMV BLD AUTO: 11.7 FL (ref 9.2–12.9)
POCT GLUCOSE: 224 MG/DL (ref 70–110)
POTASSIUM SERPL-SCNC: 4.2 MMOL/L (ref 3.5–5.1)
PROT SERPL-MCNC: 7.7 GM/DL (ref 6–8.4)
RBC # BLD AUTO: 4.97 M/UL (ref 4.6–6.2)
RELATIVE EOSINOPHIL (SMH): 0 % (ref 0–8)
RELATIVE LYMPHOCYTE (SMH): 8.8 % (ref 18–48)
RELATIVE MONOCYTE (SMH): 0.4 % (ref 4–15)
RELATIVE NEUTROPHIL (SMH): 90.4 % (ref 38–73)
SAMPLE: NORMAL
SITE: NORMAL
SODIUM SERPL-SCNC: 140 MMOL/L (ref 136–145)
SP02: 94
TROPONIN I SERPL HS-MCNC: <3 NG/L
TROPONIN I SERPL HS-MCNC: <3 NG/L
WBC # BLD AUTO: 8.25 K/UL (ref 3.9–12.7)

## 2025-05-01 PROCEDURE — 36415 COLL VENOUS BLD VENIPUNCTURE: CPT | Performed by: STUDENT IN AN ORGANIZED HEALTH CARE EDUCATION/TRAINING PROGRAM

## 2025-05-01 PROCEDURE — 63600175 PHARM REV CODE 636 W HCPCS: Performed by: STUDENT IN AN ORGANIZED HEALTH CARE EDUCATION/TRAINING PROGRAM

## 2025-05-01 PROCEDURE — 82962 GLUCOSE BLOOD TEST: CPT

## 2025-05-01 PROCEDURE — 93005 ELECTROCARDIOGRAM TRACING: CPT

## 2025-05-01 PROCEDURE — 99999 PR PBB SHADOW E&M-EST. PATIENT-LVL III: CPT | Mod: PBBFAC,HCNC,, | Performed by: ORTHOPAEDIC SURGERY

## 2025-05-01 PROCEDURE — 99285 EMERGENCY DEPT VISIT HI MDM: CPT | Mod: 25

## 2025-05-01 PROCEDURE — 83735 ASSAY OF MAGNESIUM: CPT | Performed by: STUDENT IN AN ORGANIZED HEALTH CARE EDUCATION/TRAINING PROGRAM

## 2025-05-01 PROCEDURE — 96375 TX/PRO/DX INJ NEW DRUG ADDON: CPT

## 2025-05-01 PROCEDURE — 94761 N-INVAS EAR/PLS OXIMETRY MLT: CPT

## 2025-05-01 PROCEDURE — 73562 X-RAY EXAM OF KNEE 3: CPT | Mod: 26,50,HCNC, | Performed by: RADIOLOGY

## 2025-05-01 PROCEDURE — 87389 HIV-1 AG W/HIV-1&-2 AB AG IA: CPT | Performed by: STUDENT IN AN ORGANIZED HEALTH CARE EDUCATION/TRAINING PROGRAM

## 2025-05-01 PROCEDURE — 82010 KETONE BODYS QUAN: CPT | Performed by: STUDENT IN AN ORGANIZED HEALTH CARE EDUCATION/TRAINING PROGRAM

## 2025-05-01 PROCEDURE — 96374 THER/PROPH/DIAG INJ IV PUSH: CPT

## 2025-05-01 PROCEDURE — 99900035 HC TECH TIME PER 15 MIN (STAT)

## 2025-05-01 PROCEDURE — 93010 ELECTROCARDIOGRAM REPORT: CPT | Mod: ,,, | Performed by: GENERAL PRACTICE

## 2025-05-01 PROCEDURE — 73562 X-RAY EXAM OF KNEE 3: CPT | Mod: TC,50,HCNC,PN

## 2025-05-01 PROCEDURE — 85025 COMPLETE CBC W/AUTO DIFF WBC: CPT | Performed by: STUDENT IN AN ORGANIZED HEALTH CARE EDUCATION/TRAINING PROGRAM

## 2025-05-01 PROCEDURE — 80053 COMPREHEN METABOLIC PANEL: CPT | Performed by: STUDENT IN AN ORGANIZED HEALTH CARE EDUCATION/TRAINING PROGRAM

## 2025-05-01 PROCEDURE — 84484 ASSAY OF TROPONIN QUANT: CPT | Performed by: STUDENT IN AN ORGANIZED HEALTH CARE EDUCATION/TRAINING PROGRAM

## 2025-05-01 PROCEDURE — 86803 HEPATITIS C AB TEST: CPT | Performed by: STUDENT IN AN ORGANIZED HEALTH CARE EDUCATION/TRAINING PROGRAM

## 2025-05-01 RX ORDER — ONDANSETRON HYDROCHLORIDE 2 MG/ML
4 INJECTION, SOLUTION INTRAVENOUS
Status: COMPLETED | OUTPATIENT
Start: 2025-05-01 | End: 2025-05-01

## 2025-05-01 RX ORDER — HYALURONATE SODIUM, STABILIZED 88 MG/4 ML
88 SYRINGE (ML) INTRAARTICULAR ONCE
Qty: 8 ML | Refills: 0 | Status: SHIPPED | OUTPATIENT
Start: 2025-05-01 | End: 2025-05-01

## 2025-05-01 RX ORDER — TRIAMCINOLONE ACETONIDE 40 MG/ML
40 INJECTION, SUSPENSION INTRA-ARTICULAR; INTRAMUSCULAR
Status: DISCONTINUED | OUTPATIENT
Start: 2025-05-01 | End: 2025-05-01 | Stop reason: HOSPADM

## 2025-05-01 RX ORDER — FAMOTIDINE 10 MG/ML
20 INJECTION, SOLUTION INTRAVENOUS
Status: COMPLETED | OUTPATIENT
Start: 2025-05-01 | End: 2025-05-01

## 2025-05-01 RX ADMIN — TRIAMCINOLONE ACETONIDE 40 MG: 40 INJECTION, SUSPENSION INTRA-ARTICULAR; INTRAMUSCULAR at 10:05

## 2025-05-01 RX ADMIN — FAMOTIDINE 20 MG: 10 INJECTION, SOLUTION INTRAVENOUS at 08:05

## 2025-05-01 RX ADMIN — ONDANSETRON 4 MG: 2 INJECTION INTRAMUSCULAR; INTRAVENOUS at 08:05

## 2025-05-01 NOTE — PROCEDURES
Large Joint Aspiration/Injection: L knee    Date/Time: 5/1/2025 10:00 AM    Performed by: Arturo Deluna PA  Authorized by: Arturo Deluna PA    Consent Done?:  Yes (Verbal)  Indications:  Pain  Site marked: the procedure site was marked    Timeout: prior to procedure the correct patient, procedure, and site was verified    Prep: patient was prepped and draped in usual sterile fashion      Local anesthesia used?: Yes    Local anesthetic:  Lidocaine 1% without epinephrine    Details:  Needle Size:  25 G  Ultrasonic Guidance for needle placement?: No    Approach:  Anterolateral  Location:  Knee  Site:  L knee  Medications:  40 mg triamcinolone acetonide 40 mg/mL  Patient tolerance:  Patient tolerated the procedure well with no immediate complications

## 2025-05-01 NOTE — PROCEDURES
Large Joint Aspiration/Injection: R knee    Date/Time: 5/1/2025 10:00 AM    Performed by: Arturo Deluna PA  Authorized by: Arturo Deluna PA    Consent Done?:  Yes (Verbal)  Indications:  Pain  Site marked: the procedure site was marked    Timeout: prior to procedure the correct patient, procedure, and site was verified    Prep: patient was prepped and draped in usual sterile fashion      Local anesthesia used?: Yes    Local anesthetic:  Lidocaine 1% without epinephrine    Details:  Needle Size:  25 G  Ultrasonic Guidance for needle placement?: No    Approach:  Anterolateral  Location:  Knee  Site:  R knee  Medications:  40 mg triamcinolone acetonide 40 mg/mL  Patient tolerance:  Patient tolerated the procedure well with no immediate complications

## 2025-05-01 NOTE — PROGRESS NOTES
[facility]    Subjective:     Chief Complaint:   Chief Complaint   Patient presents with    Left Knee - Pain     Bilat pain, pain is about the same as last visit, would like injections but would like to discuss the different options for injections     Right Knee - Pain       Past Medical History:   Diagnosis Date    Basal cell carcinoma     right ear    Carotid stenosis     Diabetes mellitus     pre    Gastritis     GERD (gastroesophageal reflux disease)     H. pylori infection     Hyperlipidemia     Shingles     Type 2 diabetes mellitus without complication, without long-term current use of insulin 4/1/2025       Past Surgical History:   Procedure Laterality Date    arthroscope Left     knee arthroscopy, torn meniscus    CATARACT EXTRACTION BILATERAL W/ ANTERIOR VITRECTOMY Bilateral     COLONOSCOPY      Adrien    CORONARY ANGIOGRAPHY N/A 02/07/2022    Procedure: ANGIOGRAM, CORONARY ARTERY;  Surgeon: John Linn MD;  Location: [facility] CATH/EP LAB;  Service: Cardiology;  Laterality: N/A;    CYSTOSCOPY WITH OPTICAL INTERNAL URETHROTOMY N/A 2/6/2025    Procedure: CYSTOSCOPY, WITH OPTICAL INTERNAL URETHROTOMY;  Surgeon: Bernard Watkins MD;  Location: [facility] OR;  Service: Urology;  Laterality: N/A;    ECTROPION REPAIR Bilateral 03/13/2020    Procedure: REPAIR, ECTROPION, EYELID FLAPS - (Skin and/or Deep Tissue ;  Surgeon: Sarah Blackmon MD;  Location: [address];  Service: Ophthalmology;  Laterality: Bilateral;    LEFT HEART CATHETERIZATION Left 02/07/2022    Procedure: CATHETERIZATION, HEART, LEFT;  Surgeon: John Linn MD;  Location: [facility] CATH/EP LAB;  Service: Cardiology;  Laterality: Left;       Current Outpatient Medications   Medication Sig    atorvastatin (LIPITOR) 20 MG tablet TAKE 1 TABLET(20 MG) BY MOUTH EVERY DAY    CYANOCOBALAMIN, VITAMIN B-12, (VITAMIN B-12 ORAL) Take 1 tablet by mouth once daily.    ezetimibe (ZETIA) 10 mg tablet TAKE 1 TABLET(10 MG) BY MOUTH EVERY DAY     magnesium oxide (MAG-OX) 400 mg (241.3 mg magnesium) tablet Take 400 mg by mouth once daily.    omega-3 fatty acids/fish oil (FISH OIL-OMEGA-3 FATTY ACIDS) 300-1,000 mg capsule Take by mouth once daily.    UNABLE TO FIND Beet root    VITAMIN C 500 MG tablet     vitamin D3/vitamin K2, MK4, (K2 PLUS D3 ORAL) Take by mouth.    zinc gluconate 50 mg tablet Take 50 mg by mouth once daily.    glucosam/chond-msm1/C/angely/bor (GLUCOSAMINE-CHOND-MSM COMPLEX ORAL) Take 1 tablet by mouth once daily. (Patient not taking: Reported on 2025)    metoprolol succinate (TOPROL-XL) 25 MG 24 hr tablet Take 1 tablet (25 mg total) by mouth once daily. (Patient not taking: Reported on 2025)    neomycin-polymyxin-dexamethasone (DEXACINE) 3.5 mg/g-10,000 unit/g-0.1 % Oint SMARTSI.25 Inch(es) Left Eye Twice Daily (Patient not taking: Reported on 2025)    nitroGLYCERIN (NITROSTAT) 0.4 MG SL tablet Place 1 tablet (0.4 mg total) under the tongue every 5 (five) minutes as needed for Chest pain. (Patient not taking: Reported on 2025)    prednisoLONE acetate (PRED FORTE) 1 % DrpS Place 1 drop into the left eye 4 (four) times daily. (Patient not taking: Reported on 2025)     No current facility-administered medications for this visit.     Facility-Administered Medications Ordered in Other Visits   Medication    tetracaine HCL 0.5% ophthalmic solution 1 drop       Review of patient's allergies indicates:   Allergen Reactions    No known drug allergies        Family History   Problem Relation Name Age of Onset    Heart disease Father  65        MI    Hyperlipidemia Father      Stroke Father      COPD Mother      Glaucoma Mother      No Known Problems Sister      No Known Problems Brother      No Known Problems Daughter      Diabetes Unknown          uncles  and aunts     Cancer Paternal Uncle          prostate cancer    Melanoma Neg Hx         Social History[1]    History of present illness:  84-year-old male with known  osteoarthritis in the bilateral knees.  Returns to clinic today for evaluation of complaints of bilateral knee pain.  It has been about a year since we have seen him last in the office, injected the left knee June 2024.  He has got known arthrosis with medial compartment collapse.  Not terribly interested in surgical intervention.  Injections provide him great relief in his symptoms.  He continues to be active playing pickleball.      Review of Systems:    Constitution: Negative for chills, fever, and sweats.  Negative for unexplained weight loss.    HENT:  Negative for headaches and blurry vision.    Cardiovascular:Negative for chest pain or irregular heart beat. Negative for hypertension.    Respiratory:  Negative for cough and shortness of breath.    Gastrointestinal: Negative for abdominal pain, heartburn, melena, nausea, and vomitting.    Genitourinary:  Negative bladder incontinence and dysuria.    Musculoskeletal:  See HPI for details.     Neurological: Negative for numbness.    Psychiatric/Behavioral: Negative for depression.  The patient is not nervous/anxious.      Endocrine: Negative for polyuria    Hematologic/Lymphatic: Negative for bleeding problem.  Does not bruise/bleed easily.    Skin: Negative for poor would healing and rash    Objective:      Physical Examination:    Vital Signs:    Vitals:    05/01/25 1001   BP: (!) 144/80       Body mass index is 24.41 kg/m².    This a well-developed, well nourished patient in no acute distress.  They are alert and oriented and cooperative to examination.        Examination of the bilateral knees, skin is dry and intact, no erythema or ecchymosis, no signs symptoms of infection.  Range motion 0-120 degrees.  Stable anterior-posterior varus and valgus stress.  Homans signs negative, straight leg raise is negative.  Distally neurovascularly intact.  Pertinent New Results:    XRAY Report / Interpretation:   AP lateral sunrise views of the bilateral knees taken  "today in the office demonstrate medial compartment collapse with near bone-on-bone deformity and femoral condyle squaring.  Patellofemoral joints with only mild osteophyte formation.    Assessment/Plan:      Bilateral moderate to severe knee osteoarthritis.  We injected bilateral knees today, anterior lateral approach sterile technique lidocaine and triamcinolone.  He tolerated well.  He is interested in hyaluronic acid, this is reasonable we discussed the risks and benefits we will place a prior authorization.  If approved he will follow up for administration.  Otherwise she will follow up on an as-needed basis for repeat steroid injections.    Arturo Deluna, Physician Assistant, served in the capacity as a "scribe" for this patient encounter.  A "face-to-face" encounter occurred with Dr. Garcia Plaza on this date.  The treatment plan and medical decision-making is outlined above. Patient was seen and examined with a chaperone.       This note was created using Dragon voice recognition software that occasionally misinterpreted phrases or words.               [1]   Social History  Socioeconomic History    Marital status:    Tobacco Use    Smoking status: Former     Current packs/day: 0.25     Types: Cigarettes    Smokeless tobacco: Never    Tobacco comments:     Social smoker when younger    Substance and Sexual Activity    Alcohol use: No    Drug use: No    Sexual activity: Yes     Social Drivers of Health     Financial Resource Strain: Low Risk  (4/1/2025)    Overall Financial Resource Strain (CARDIA)     Difficulty of Paying Living Expenses: Not hard at all   Food Insecurity: No Food Insecurity (4/1/2025)    Hunger Vital Sign     Worried About Running Out of Food in the Last Year: Never true     Ran Out of Food in the Last Year: Never true   Transportation Needs: No Transportation Needs (4/1/2025)    PRAPARE - Transportation     Lack of Transportation (Medical): No     Lack of Transportation " (Non-Medical): No   Physical Activity: Sufficiently Active (4/1/2025)    Exercise Vital Sign     Days of Exercise per Week: 3 days     Minutes of Exercise per Session: 60 min   Stress: No Stress Concern Present (4/1/2025)    [employer] - Occupational Stress Questionnaire     Feeling of Stress : Not at all   Housing Stability: Low Risk  (4/1/2025)    Housing Stability Vital Sign     Unable to Pay for Housing in the Last Year: No     Homeless in the Last Year: No

## 2025-05-02 LAB
OHS QRS DURATION: 82 MS
OHS QTC CALCULATION: 434 MS

## 2025-05-02 NOTE — ED PROVIDER NOTES
Encounter Date: 5/1/2025       History     Chief Complaint   Patient presents with    Chest Pain     Midsternal chest pain, after eating and experiencing food bolus symptoms x 3 hours.    Food Bolus     Reports inability to swallow secretions, nausea, with midsternal chest pain, after eating 3 hours ago.      HPI  84-year-old man with a history of nonobstructive CAD diabetes hypertension hyperlipidemia presents for evaluation of epigastric pain.    He reports around 330 he ate a piece of turkey and felt like he got stuck.  He has been unable to tolerate anything by mouth since then.    He denies other chest pain shortness of breath belly pain.  Reports nausea without vomiting.  Denies any other complaints.  Does not take any aspirin Plavix or blood thinners.  This is never happened to him before.  He denies a history of scopes.  Review of patient's allergies indicates:   Allergen Reactions    No known drug allergies      Past Medical History:   Diagnosis Date    Basal cell carcinoma     right ear    Carotid stenosis     Diabetes mellitus     pre    Gastritis     GERD (gastroesophageal reflux disease)     H. pylori infection     Hyperlipidemia     Shingles     Type 2 diabetes mellitus without complication, without long-term current use of insulin 4/1/2025     Past Surgical History:   Procedure Laterality Date    arthroscope Left     knee arthroscopy, torn meniscus    CATARACT EXTRACTION BILATERAL W/ ANTERIOR VITRECTOMY Bilateral     COLONOSCOPY      Adrien    CORONARY ANGIOGRAPHY N/A 02/07/2022    Procedure: ANGIOGRAM, CORONARY ARTERY;  Surgeon: John Linn MD;  Location: [facility] CATH/EP LAB;  Service: Cardiology;  Laterality: N/A;    CYSTOSCOPY WITH OPTICAL INTERNAL URETHROTOMY N/A 2/6/2025    Procedure: CYSTOSCOPY, WITH OPTICAL INTERNAL URETHROTOMY;  Surgeon: Bernard Watkins MD;  Location: [facility] OR;  Service: Urology;  Laterality: N/A;    ECTROPION REPAIR Bilateral 03/13/2020    Procedure: REPAIR,  ECTROPION, EYELID FLAPS - (Skin and/or Deep Tissue ;  Surgeon: Sarah Blackmon MD;  Location: [facility] OR [address] [place] [address];  Service: Ophthalmology;  Laterality: Bilateral;    LEFT HEART CATHETERIZATION Left 02/07/2022    Procedure: CATHETERIZATION, HEART, LEFT;  Surgeon: John Linn MD;  Location: [facility] CATH/EP LAB;  Service: Cardiology;  Laterality: Left;     Family History   Problem Relation Name Age of Onset    Heart disease Father  65        MI    Hyperlipidemia Father      Stroke Father      COPD Mother      Glaucoma Mother      No Known Problems Sister      No Known Problems Brother      No Known Problems Daughter      Diabetes Unknown          uncles  and aunts     Cancer Paternal Uncle          prostate cancer    Melanoma Neg Hx       Social History[1]  Review of Systems   All other systems reviewed and are negative.      Physical Exam     Initial Vitals   BP Pulse Resp Temp SpO2   05/01/25 1947 05/01/25 1947 05/01/25 1947 05/01/25 1949 05/01/25 1947   (!) 214/102 102 20 98.2 °F (36.8 °C) 99 %      MAP       --                Physical Exam    Nursing note and vitals reviewed.  Constitutional: He appears well-developed and well-nourished.   HENT:   Head: Normocephalic and atraumatic.   Eyes: Right eye exhibits no discharge. Left eye exhibits no discharge.   Neck: No tracheal deviation present.   Cardiovascular:  Regular rhythm and intact distal pulses.           Tachycardic   Pulmonary/Chest: Breath sounds normal. No stridor. No respiratory distress.   Abdominal: Abdomen is soft. Bowel sounds are normal. There is no abdominal tenderness. There is no rebound and no guarding.   Musculoskeletal:         General: No edema.     Neurological: He is alert and oriented to person, place, and time.   Skin: Skin is warm and dry.         ED Course   Procedures  Labs Reviewed   COMPREHENSIVE METABOLIC PANEL - Abnormal       Result Value    Sodium 140      Potassium 4.2      Chloride 109      CO2 18 (*)     Glucose 269 (*)      BUN 34 (*)     Creatinine 1.0      Calcium 9.8      Protein Total 7.7      Albumin 4.3      Bilirubin Total 1.2 (*)           AST 32      ALT 32      Anion Gap 13      eGFR >60     CBC WITH DIFFERENTIAL - Abnormal    WBC 8.25      RBC 4.97      Hgb 16.0      Hct 45.8      MCV 92      MCH 32.2 (*)     MCHC 34.9      RDW 12.3      Platelet Count 224      MPV 11.7      Nucleated RBC 0      Neut % 90.4 (*)     Lymph % 8.8 (*)     Mono % 0.4 (*)     Eos % 0.0      Basophil % 0.0      Imm Grans % 0.4      Neut # 7.5      Lymph # 0.73 (*)     Mono # 0.03 (*)     Eos # 0.00      Baso # 0.00      Imm Grans # 0.03     POCT GLUCOSE - Abnormal    POCT Glucose 224 (*)    HEPATITIS C ANTIBODY - Normal    Hep C Ab Interp Non-Reactive     HIV 1 / 2 ANTIBODY - Normal    HIV 1/2 Ag/Ab Non-Reactive     MAGNESIUM - Normal    Magnesium 2.0     TROPONIN I HIGH SENSITIVITY - Normal    Troponin High Sensitive <3     TROPONIN I HIGH SENSITIVITY - Normal    Troponin High Sensitive <3     BETA - HYDROXYBUTYRATE, SERUM - Normal    Beta-Hydroxybutyrate 0.3     CBC W/ AUTO DIFFERENTIAL    Narrative:     The following orders were created for panel order CBC auto differential.  Procedure                               Abnormality         Status                     ---------                               -----------         ------                     CBC with Differential[[id number]]       Abnormal            Final result                 Please view results for these tests on the individual orders.   ISTAT PROCEDURE    POC PH 7.392      Rate 18      Sample VENOUS      Site Other      Allens Test N/A      DelSys Room Air      Mode SPONT      FiO2 21      Sp02 94            Imaging Results              X-Ray Chest AP Portable (Final result)  Result time 05/01/25 21:58:28      Final result by Clint Whitt, DO (05/01/25 21:58:28)                   Impression:      No acute abnormality.      Electronically signed by: Clint  Peri  Date:    05/01/2025  Time:    21:58               Narrative:    EXAMINATION:  XR CHEST AP PORTABLE    CLINICAL HISTORY:  Chest Pain;    TECHNIQUE:  Single frontal view of the chest was performed.    COMPARISON:  02/03/2025    FINDINGS:  The lungs are well expanded and clear. No focal opacities are seen. The pleural spaces are clear. The cardiac silhouette is unremarkable. The visualized osseous structures demonstrate degenerative changes.                                       Medications   sodium bicarbonate-citric acid-simethicone (EZ GAS II) 2.21-1.53 gram/4 gram oral granules (  Canceled Entry 5/1/25 2030)   famotidine (PF) injection 20 mg (20 mg Intravenous Given 5/1/25 2011)   ondansetron injection 4 mg (4 mg Intravenous Given 5/1/25 2012)     Medical Decision Making  84-year-old man presents for a piece of turkey that got stuck in his throat, he is tachycardic and hypertensive, he is nontoxic-appearing he is lying comfortably in bed, he is spitting into a bag but protecting his airway.  Chest pain workup ordered but my suspicion is this is a fluid bolus.  He does not need a D-dimer I do not think he is suffering from pulmonary embolism.  I do not think this is ACS.  We will give him an antiemetic has a less than Pepcid and trial of carbonated beverage then plan to discuss with GI if not relieved.    Discharge after food bolus resolved with carbonated beverage.    Amount and/or Complexity of Data Reviewed  Independent Historian: spouse  Labs: ordered. Decision-making details documented in ED Course.  Radiology: ordered and independent interpretation performed. Decision-making details documented in ED Course.  ECG/medicine tests: ordered and independent interpretation performed. Decision-making details documented in ED Course.    Risk  Prescription drug management.  Decision regarding hospitalization.               ED Course as of 05/01/25 2325   Thu May 01, 2025   1948 EKG on my independent  interpretation sinus tachycardia proximally 110 beats per minute left axis no STEMI QTC less than 500 when compared to prior from 02/03/2025 it appears similar [IC]   2011 Vomited up some of the pieces of food feels a little bit better but still having symptoms [IC]   2049 I do not appreciate focal consolidation or pneumothorax my independent interpretation of chest x-ray [IC]   2125 He feels like he has passed his obstruction, he has tolerated p.o., he feels back to his normal self [IC]   2248 Beta-Hydroxybutyrate: 0.3 [IC]   2251 PH 7.39 [IC]   2251 Overall picture not consistent with DKA [IC]   2322 He wants to go home he has tolerated p.o. he feels like his normal self we discussed his results we discussed he is not in DKA we discussed GI follow up as an outpatient, Return precautions/follow up instructions/treatment plan given, expressed agreement and understanding.    [IC]   2323 Do not think he needs an admission for high risk chest pain in his clinical setting with history physical c/w food bolus now resolved [IC]      ED Course User Index  [IC] Andrews Mckay MD                           Clinical Impression:  Final diagnoses:  [R07.9] Chest pain  [K56.699, W44.F3XA] Food bolus obstruction of intestine (Primary)                     [1]   Social History  Tobacco Use    Smoking status: Former     Current packs/day: 0.25     Types: Cigarettes    Smokeless tobacco: Never    Tobacco comments:     Social smoker when younger    Substance Use Topics    Alcohol use: No    Drug use: No        Andrews Mckay MD  05/01/25 [id number]

## 2025-05-02 NOTE — DISCHARGE INSTRUCTIONS
You were seen for a food bolus.  It went away with treatment.  I referred you to GI.      Please return to this facility or another ED as needed for any new or worsening symptoms including chest pain, shortness of breath, abdominal pain, nausea or vomiting, fever or chills. Please follow up with your primary care doctor in 3 days. Please take all medicines as prescribed.

## 2025-05-02 NOTE — ED NOTES
Pt drinking water and tolerating well and swallowing without difficulty  
Report/care handed off to JONN Cline  
FAMILY HISTORY:  Father  Still living? Unknown  FH: heart disease, Age at diagnosis: Age Unknown

## 2025-05-13 ENCOUNTER — TELEPHONE (OUTPATIENT)
Dept: FAMILY MEDICINE | Facility: CLINIC | Age: 85
End: 2025-05-13
Payer: MEDICARE

## 2025-05-20 ENCOUNTER — TELEPHONE (OUTPATIENT)
Dept: FAMILY MEDICINE | Facility: CLINIC | Age: 85
End: 2025-05-20
Payer: MEDICARE

## 2025-05-21 ENCOUNTER — PATIENT OUTREACH (OUTPATIENT)
Dept: ADMINISTRATIVE | Facility: HOSPITAL | Age: 85
End: 2025-05-21
Payer: MEDICARE

## 2025-05-21 DIAGNOSIS — E11.9 TYPE 2 DIABETES MELLITUS WITHOUT COMPLICATION, WITHOUT LONG-TERM CURRENT USE OF INSULIN: Primary | ICD-10-CM

## 2025-05-22 ENCOUNTER — LAB VISIT (OUTPATIENT)
Dept: LAB | Facility: HOSPITAL | Age: 85
End: 2025-05-22
Attending: FAMILY MEDICINE
Payer: MEDICARE

## 2025-05-22 DIAGNOSIS — E11.69 HYPERLIPIDEMIA ASSOCIATED WITH TYPE 2 DIABETES MELLITUS: ICD-10-CM

## 2025-05-22 DIAGNOSIS — E11.9 TYPE 2 DIABETES MELLITUS WITHOUT COMPLICATION, WITHOUT LONG-TERM CURRENT USE OF INSULIN: ICD-10-CM

## 2025-05-22 DIAGNOSIS — E78.5 HYPERLIPIDEMIA ASSOCIATED WITH TYPE 2 DIABETES MELLITUS: ICD-10-CM

## 2025-05-22 DIAGNOSIS — I65.29 STENOSIS OF CAROTID ARTERY, UNSPECIFIED LATERALITY: ICD-10-CM

## 2025-05-22 DIAGNOSIS — E78.2 HYPERLIPIDEMIA, MIXED: ICD-10-CM

## 2025-05-22 LAB
ALBUMIN SERPL BCP-MCNC: 3.8 G/DL (ref 3.5–5.2)
ALBUMIN/CREAT UR: NORMAL
ALP SERPL-CCNC: 85 UNIT/L (ref 40–150)
ALT SERPL W/O P-5'-P-CCNC: 35 UNIT/L (ref 10–44)
ANION GAP (OHS): 12 MMOL/L (ref 8–16)
AST SERPL-CCNC: 25 UNIT/L (ref 11–45)
BILIRUB SERPL-MCNC: 1.8 MG/DL (ref 0.1–1)
BUN SERPL-MCNC: 14 MG/DL (ref 8–23)
CALCIUM SERPL-MCNC: 9.1 MG/DL (ref 8.7–10.5)
CHLORIDE SERPL-SCNC: 105 MMOL/L (ref 95–110)
CHOLEST SERPL-MCNC: 117 MG/DL (ref 120–199)
CHOLEST/HDLC SERPL: 2.5 {RATIO} (ref 2–5)
CO2 SERPL-SCNC: 22 MMOL/L (ref 23–29)
CREAT SERPL-MCNC: 0.9 MG/DL (ref 0.5–1.4)
CREAT UR-MCNC: 51 MG/DL (ref 23–375)
EAG (OHS): 123 MG/DL (ref 68–131)
GFR SERPLBLD CREATININE-BSD FMLA CKD-EPI: >60 ML/MIN/1.73/M2
GLUCOSE SERPL-MCNC: 136 MG/DL (ref 70–110)
HBA1C MFR BLD: 5.9 % (ref 4–5.6)
HDLC SERPL-MCNC: 46 MG/DL (ref 40–75)
HDLC SERPL: 39.3 % (ref 20–50)
LDLC SERPL CALC-MCNC: 56.8 MG/DL (ref 63–159)
MICROALBUMIN UR-MCNC: <5 UG/ML (ref ?–5000)
NONHDLC SERPL-MCNC: 71 MG/DL
POTASSIUM SERPL-SCNC: 4.3 MMOL/L (ref 3.5–5.1)
PROT SERPL-MCNC: 6.9 GM/DL (ref 6–8.4)
SODIUM SERPL-SCNC: 139 MMOL/L (ref 136–145)
TRIGL SERPL-MCNC: 71 MG/DL (ref 30–150)

## 2025-05-22 PROCEDURE — 83036 HEMOGLOBIN GLYCOSYLATED A1C: CPT | Mod: HCNC

## 2025-05-22 PROCEDURE — 80053 COMPREHEN METABOLIC PANEL: CPT | Mod: HCNC

## 2025-05-22 PROCEDURE — 82043 UR ALBUMIN QUANTITATIVE: CPT | Mod: HCNC

## 2025-05-22 PROCEDURE — 84425 ASSAY OF VITAMIN B-1: CPT

## 2025-05-22 PROCEDURE — 84630 ASSAY OF ZINC: CPT | Mod: HCNC

## 2025-05-22 PROCEDURE — 83718 ASSAY OF LIPOPROTEIN: CPT | Mod: HCNC

## 2025-05-22 PROCEDURE — 36415 COLL VENOUS BLD VENIPUNCTURE: CPT | Mod: HCNC,PO

## 2025-05-23 ENCOUNTER — RESULTS FOLLOW-UP (OUTPATIENT)
Dept: FAMILY MEDICINE | Facility: CLINIC | Age: 85
End: 2025-05-23

## 2025-05-27 ENCOUNTER — RESULTS FOLLOW-UP (OUTPATIENT)
Dept: FAMILY MEDICINE | Facility: CLINIC | Age: 85
End: 2025-05-27

## 2025-05-27 LAB — W ZINC: 107 UG/DL

## 2025-05-29 ENCOUNTER — OFFICE VISIT (OUTPATIENT)
Dept: FAMILY MEDICINE | Facility: CLINIC | Age: 85
End: 2025-05-29
Payer: MEDICARE

## 2025-05-29 VITALS
HEIGHT: 71 IN | BODY MASS INDEX: 23.67 KG/M2 | HEART RATE: 79 BPM | TEMPERATURE: 98 F | OXYGEN SATURATION: 100 % | SYSTOLIC BLOOD PRESSURE: 122 MMHG | WEIGHT: 169.06 LBS | DIASTOLIC BLOOD PRESSURE: 64 MMHG

## 2025-05-29 DIAGNOSIS — E11.9 TYPE 2 DIABETES MELLITUS WITHOUT COMPLICATION, WITHOUT LONG-TERM CURRENT USE OF INSULIN: Primary | ICD-10-CM

## 2025-05-29 DIAGNOSIS — E78.5 HYPERLIPIDEMIA ASSOCIATED WITH TYPE 2 DIABETES MELLITUS: ICD-10-CM

## 2025-05-29 DIAGNOSIS — E11.69 HYPERLIPIDEMIA ASSOCIATED WITH TYPE 2 DIABETES MELLITUS: ICD-10-CM

## 2025-05-29 LAB — W VITAMIN B1: 62 UG/L

## 2025-05-29 PROCEDURE — 1101F PT FALLS ASSESS-DOCD LE1/YR: CPT | Mod: CPTII,S$GLB,,

## 2025-05-29 PROCEDURE — 3288F FALL RISK ASSESSMENT DOCD: CPT | Mod: CPTII,S$GLB,,

## 2025-05-29 PROCEDURE — 1159F MED LIST DOCD IN RCRD: CPT | Mod: CPTII,S$GLB,,

## 2025-05-29 PROCEDURE — 3074F SYST BP LT 130 MM HG: CPT | Mod: CPTII,S$GLB,,

## 2025-05-29 PROCEDURE — G2211 COMPLEX E/M VISIT ADD ON: HCPCS | Mod: S$GLB,,,

## 2025-05-29 PROCEDURE — 1160F RVW MEDS BY RX/DR IN RCRD: CPT | Mod: CPTII,S$GLB,,

## 2025-05-29 PROCEDURE — 99999 PR PBB SHADOW E&M-EST. PATIENT-LVL IV: CPT | Mod: PBBFAC,,,

## 2025-05-29 PROCEDURE — 1126F AMNT PAIN NOTED NONE PRSNT: CPT | Mod: CPTII,S$GLB,,

## 2025-05-29 PROCEDURE — 3078F DIAST BP <80 MM HG: CPT | Mod: CPTII,S$GLB,,

## 2025-05-29 PROCEDURE — 99214 OFFICE O/P EST MOD 30 MIN: CPT | Mod: S$GLB,,,

## 2025-05-29 NOTE — PROGRESS NOTES
Subjective:       Patient ID: Willie Liao is a 84 y.o. male.    Chief Complaint: 3 month follow up       iWllie Liao is a 84 y.o. male with HLD and T2DM is here for 3 month follow up visit. Labs were drawn prior to visit. Patient is compliant with medications. Pt has no new concerns going on at the moment. Discussed A1c goals and following diabetic diets. Pt was informed of diabetic eye exam, stated he has an appointment next week with Dr. Bae. Pt denies fatigue, chest pain, SOB, abdominal pain, or weakness.        Review of Systems   Constitutional:  Negative for fatigue.   Respiratory:  Negative for cough and shortness of breath.    Cardiovascular:  Negative for chest pain.   Gastrointestinal:  Negative for abdominal pain.   Genitourinary:  Negative for dysuria.   Neurological:  Negative for weakness and light-headedness.         Past Medical History:   Diagnosis Date    Basal cell carcinoma     right ear    Carotid stenosis     Diabetes mellitus     pre    Gastritis     GERD (gastroesophageal reflux disease)     H. pylori infection     Hyperlipidemia     Shingles     Type 2 diabetes mellitus without complication, without long-term current use of insulin 4/1/2025       Review of patient's allergies indicates:   Allergen Reactions    No known drug allergies        Current Medications[1]    Objective:        Physical Exam  Vitals and nursing note reviewed.   Constitutional:       Appearance: Normal appearance.   HENT:      Head: Normocephalic and atraumatic.   Eyes:      General: No scleral icterus.     Conjunctiva/sclera: Conjunctivae normal.   Cardiovascular:      Rate and Rhythm: Normal rate and regular rhythm.   Pulmonary:      Effort: Pulmonary effort is normal.      Breath sounds: Normal breath sounds.   Skin:     General: Skin is warm and dry.   Neurological:      General: No focal deficit present.      Mental Status: He is alert and oriented to person, place, and time.   Psychiatric:          "Mood and Affect: Mood normal.         Behavior: Behavior normal.         Thought Content: Thought content normal.         Judgment: Judgment normal.           Visit Vitals  /64 (BP Location: Right arm, Patient Position: Sitting)   Pulse 79   Temp 97.6 °F (36.4 °C) (Oral)   Ht 5' 11" (1.803 m)   Wt 76.7 kg (169 lb 1.5 oz)   SpO2 100%   BMI 23.58 kg/m²      Assessment:         1. Type 2 diabetes mellitus without complication, without long-term current use of insulin    2. Hyperlipidemia associated with type 2 diabetes mellitus        Plan:         Diagnoses and all orders for this visit:    Type 2 diabetes mellitus without complication, without long-term current use of insulin  -     Hemoglobin A1C; Future  -     Well controlled with diet. Continue to monitor.    Hyperlipidemia associated with type 2 diabetes mellitus  -     Comprehensive Metabolic Panel; Future  -     CBC Auto Differential; Future  -      Well controlled on Lipitor and Zetia.     Follow up in 3 months with labs prior.    Patient evaluated with PA-S. I agree with physical exam findings and documentation of PA-S.         Family Medicine Physician Assistant     Future Appointments       Date Provider Specialty Appt Notes    6/2/2025 DAJUAN Luke PA-C Endocrinology dm    9/15/2025  Lab .f    9/17/2025 Cooper Luz MD Family Medicine 6 mo f/u    10/23/2025 Nadia Azevedo MD Dermatology 6 month skin check    12/3/2025 Cooper Luz MD Family Medicine Annual             Tests to Keep You Healthy    Eye Exam: SCHEDULED       I spent a total of 20 minutes on the day of the visit.This includes face to face time and non-face to face time preparing to see the patient (eg, review of tests), obtaining and/or reviewing separately obtained history, documenting clinical information in the electronic or other health record, independently interpreting results and communicating results to the patient/family/caregiver, or care " coordinator.         [1]   Current Outpatient Medications:     atorvastatin (LIPITOR) 20 MG tablet, TAKE 1 TABLET(20 MG) BY MOUTH EVERY DAY, Disp: 90 tablet, Rfl: 2    CYANOCOBALAMIN, VITAMIN B-12, (VITAMIN B-12 ORAL), Take 1 tablet by mouth once daily., Disp: , Rfl:     ezetimibe (ZETIA) 10 mg tablet, TAKE 1 TABLET(10 MG) BY MOUTH EVERY DAY, Disp: 90 tablet, Rfl: 3    glucosam/chond-msm1/C/angely/bor (GLUCOSAMINE-CHOND-MSM COMPLEX ORAL), Take 1 tablet by mouth once daily., Disp: , Rfl:     magnesium oxide (MAG-OX) 400 mg (241.3 mg magnesium) tablet, Take 400 mg by mouth once daily., Disp: , Rfl:     metoprolol succinate (TOPROL-XL) 25 MG 24 hr tablet, Take 1 tablet (25 mg total) by mouth once daily., Disp: 90 tablet, Rfl: 11    neomycin-polymyxin-dexamethasone (DEXACINE) 3.5 mg/g-10,000 unit/g-0.1 % Oint, , Disp: , Rfl:     omega-3 fatty acids/fish oil (FISH OIL-OMEGA-3 FATTY ACIDS) 300-1,000 mg capsule, Take by mouth once daily., Disp: , Rfl:     prednisoLONE acetate (PRED FORTE) 1 % DrpS, Place 1 drop into the left eye 4 (four) times daily., Disp: , Rfl:     UNABLE TO FIND, Beet root, Disp: , Rfl:     VITAMIN C 500 MG tablet, , Disp: , Rfl:     vitamin D3/vitamin K2, MK4, (K2 PLUS D3 ORAL), Take by mouth., Disp: , Rfl:     zinc gluconate 50 mg tablet, Take 50 mg by mouth once daily., Disp: , Rfl:     nitroGLYCERIN (NITROSTAT) 0.4 MG SL tablet, Place 1 tablet (0.4 mg total) under the tongue every 5 (five) minutes as needed for Chest pain. (Patient not taking: Reported on 5/1/2025), Disp: 25 tablet, Rfl: 6  No current facility-administered medications for this visit.    Facility-Administered Medications Ordered in Other Visits:     tetracaine HCL 0.5% ophthalmic solution 1 drop, 1 drop, Both Eyes, Once, Peri Galeana MD

## 2025-08-14 ENCOUNTER — TELEPHONE (OUTPATIENT)
Dept: DERMATOLOGY | Facility: CLINIC | Age: 85
End: 2025-08-14
Payer: MEDICARE

## (undated) DEVICE — DRAPE STERI-DRAPE 1000 17X11IN

## (undated) DEVICE — PACK CYSTOSCOPY III SMH

## (undated) DEVICE — TAPE SURG TRANSPORE 1 SNG USE

## (undated) DEVICE — NDL 22GA X1 1/2 REG BEVEL

## (undated) DEVICE — GAUZE SPONGE 4X4 12PLY

## (undated) DEVICE — GLOVE SENSICARE PI GRN 6.5

## (undated) DEVICE — APPLICATOR STERILE 3IN

## (undated) DEVICE — SCRUB DYNA-HEX LIQ 4% CHG 4OZ

## (undated) DEVICE — INSTRUMENT SURG SUCT FRZR W/C

## (undated) DEVICE — CUP MEDICINE STERILE 2OZ

## (undated) DEVICE — GUIDEWIRE DOUBLE ENDED .035 DIA. 150CML

## (undated) DEVICE — ELECTRODE REM PLYHSV RETURN 9

## (undated) DEVICE — DROPPER MEDICINE

## (undated) DEVICE — SHEATH PINNACLE 6FRX10CM W/GUIDEWIRE

## (undated) DEVICE — SOL BSS BALANCED SALT

## (undated) DEVICE — GOWN SURGICAL X-LARGE

## (undated) DEVICE — NDL HYPO A BEVEL 30X1/2

## (undated) DEVICE — SOL IRR WATER STRL 3000 ML

## (undated) DEVICE — PENCIL ROCKER SWITCH 10FT CORD

## (undated) DEVICE — SHEET EENT SPLIT

## (undated) DEVICE — CLEANER TIP ELECSURG 2X2IN

## (undated) DEVICE — SYR 10CC LUER LOCK

## (undated) DEVICE — TRAY MUSCLE LID EYE

## (undated) DEVICE — NDL STRAIGHT 4CM LEIBINGER

## (undated) DEVICE — GLOVE SURG BIOGEL LATEX SZ 7.5

## (undated) DEVICE — TUBING SUC UNIV W/CONN 12FT

## (undated) DEVICE — CATHETER DIAGNOSTIC DXTERITY 6F PIGST

## (undated) DEVICE — BLADE SURG #15 CARBON STEEL

## (undated) DEVICE — DRAPE STERI INSTRUMENT 1018

## (undated) DEVICE — SEE MEDLINE ITEM 152622

## (undated) DEVICE — CATHETER DIAGNOSTIC DXTERITY 6FR JL 4

## (undated) DEVICE — SOL IRRI STRL WATER 1000ML

## (undated) DEVICE — SOL WATER STRL IRR 1000ML

## (undated) DEVICE — SEE MEDLINE ITEM 152487

## (undated) DEVICE — DRESSING EYE OVAL LF

## (undated) DEVICE — SOL BETADINE 5%

## (undated) DEVICE — CATHETER DIAGNOSTIC DXTERITY 6FR JR 4.0

## (undated) DEVICE — STRAP OR TABLE 5IN X 72IN

## (undated) DEVICE — SKINMARKER & RULER REGULAR X-F

## (undated) DEVICE — SYR 5CC LUER LOCK W/O NDL

## (undated) DEVICE — SET Y-TYPE TUR IRRIGATION 96IN

## (undated) DEVICE — PROTECTOR CORNEAL CROUCH ST

## (undated) DEVICE — PAD EYE OVAL CNTOUR 1.62X2.62